# Patient Record
Sex: MALE | Race: WHITE | NOT HISPANIC OR LATINO | Employment: FULL TIME | URBAN - METROPOLITAN AREA
[De-identification: names, ages, dates, MRNs, and addresses within clinical notes are randomized per-mention and may not be internally consistent; named-entity substitution may affect disease eponyms.]

---

## 2017-02-04 ENCOUNTER — ALLSCRIPTS OFFICE VISIT (OUTPATIENT)
Dept: OTHER | Facility: OTHER | Age: 52
End: 2017-02-04

## 2017-02-16 LAB
BUN SERPL-MCNC: 11 MG/DL (ref 6–24)
BUN/CREA RATIO (HISTORICAL): 10 (ref 9–20)
CREAT SERPL-MCNC: 1.09 MG/DL (ref 0.76–1.27)
EGFR AFRICAN AMERICAN (HISTORICAL): 90 ML/MIN/1.73
EGFR-AMERICAN CALC (HISTORICAL): 78 ML/MIN/1.73

## 2017-02-17 ENCOUNTER — GENERIC CONVERSION - ENCOUNTER (OUTPATIENT)
Dept: OTHER | Facility: OTHER | Age: 52
End: 2017-02-17

## 2017-02-22 DIAGNOSIS — N28.9 DISORDER OF KIDNEY AND URETER: ICD-10-CM

## 2017-02-24 ENCOUNTER — GENERIC CONVERSION - ENCOUNTER (OUTPATIENT)
Dept: OTHER | Facility: OTHER | Age: 52
End: 2017-02-24

## 2017-02-27 ENCOUNTER — GENERIC CONVERSION - ENCOUNTER (OUTPATIENT)
Dept: OTHER | Facility: OTHER | Age: 52
End: 2017-02-27

## 2017-02-28 LAB — INTERPRETATION (HISTORICAL): NORMAL

## 2017-03-01 ENCOUNTER — GENERIC CONVERSION - ENCOUNTER (OUTPATIENT)
Dept: OTHER | Facility: OTHER | Age: 52
End: 2017-03-01

## 2017-03-01 ENCOUNTER — ALLSCRIPTS OFFICE VISIT (OUTPATIENT)
Dept: OTHER | Facility: OTHER | Age: 52
End: 2017-03-01

## 2017-03-01 LAB
BILIRUB UR QL STRIP: NEGATIVE
CLARITY UR: NORMAL
COLOR UR: CLEAR
GLUCOSE (HISTORICAL): NORMAL
HGB UR QL STRIP.AUTO: NEGATIVE
KETONES UR STRIP-MCNC: NEGATIVE MG/DL
LEUKOCYTE ESTERASE UR QL STRIP: NEGATIVE
NITRITE UR QL STRIP: NEGATIVE
PH UR STRIP.AUTO: 6.5 [PH]
PROT UR STRIP-MCNC: NEGATIVE MG/DL
SP GR UR STRIP.AUTO: 1.01
UROBILINOGEN UR QL STRIP.AUTO: NORMAL

## 2017-03-05 LAB
CULTURE RESULT (HISTORICAL): NORMAL
MISCELLANEOUS LAB TEST RESULT (HISTORICAL): NORMAL

## 2017-03-06 ENCOUNTER — GENERIC CONVERSION - ENCOUNTER (OUTPATIENT)
Dept: OTHER | Facility: OTHER | Age: 52
End: 2017-03-06

## 2017-03-15 ENCOUNTER — ALLSCRIPTS OFFICE VISIT (OUTPATIENT)
Dept: OTHER | Facility: OTHER | Age: 52
End: 2017-03-15

## 2017-06-01 ENCOUNTER — GENERIC CONVERSION - ENCOUNTER (OUTPATIENT)
Dept: OTHER | Facility: OTHER | Age: 52
End: 2017-06-01

## 2018-01-09 NOTE — RESULT NOTES
Message    Spoke to patient about the biopsy results  He has no symptoms  Regular bowel movements  Advised him to call if he develops any GI symptoms  1    Next colonoscopy in one year        reminder set for 1yr  thanks CF1       1 Amended By: Gilberto Mandujano; Mar 18 2016 11:33 AM EST    Verified Results  (1) TISSUE EXAM [id number] 09:47AM Sheela Pickens     Test Name Result Flag Reference   LAB AP CASE REPORT (Report)     Surgical Pathology Report             Case: [id number]                   Authorizing Provider: Sissy Pavon MD     Collected:      03/11/2016 [facility]  97         Ordering Location:   [facility]  Received:      03/11/2016 [address]                                     Pathologist:      Stephanie Shoemaker MD                                 Specimens:  A) - Large Intestine, Cecum, illeocecal valve; ulceration; cold bx                   B) - Polyp, Colorectal, 2 polyps same jar; cold snare and cold bx   LAB AP FINAL DIAGNOSIS (Report)     A  Ileocecal valve ulceration (biopsy):    - Focal active colitis  - No dysplasia, granulomata, or neoplasia identified  B  Sigmoid colon polyps (biopsy):    - Portions of tubular adenoma involving 3 of 4 tissue pieces  - No high grade dysplasia or malignancy identified  LAB AP NOTE      Intradepartmental consultation concurs with the diagnosis  Interpretation performed at [facility], [address] [facility] 18  LAB AP SURGICAL ADDITIONAL INFORMATION (Report)     These tests were developed and their performance characteristics   determined by [address] [place] [address] [facility] or [facility]  They may not be cleared or approved by the U S  Food and   Drug Administration  The FDA has determined that such clearance or   approval is not necessary  These tests are used for clinical purposes  They should not be regarded as investigational or for research   This   laboratory has been approved by CLIA 88, designated as a high-complexity   laboratory and is qualified to perform these tests  LAB AP GROSS DESCRIPTION (Report)     A  The specimen is received in formalin, labeled with the patient's name   and hospital number, and is designated ileocecal valve ulceration   biopsy  The specimen consists of multiple tan soft tissue fragments   measuring in aggregate 0 7 x 0 6 x 0 1 cm  Entirely submitted  One   cassette  B  The specimen is received in formalin, labeled with the patient's name   and hospital number, and is designated sigmoid colon polyps  The   specimen consists of 3 tan soft tissue fragments each measuring 0 3 cm  Entirely submitted  One cassette  Note: The estimated total formalin fixation time based upon information   provided by the submitting clinician and the standard processing schedule   is 66 5 hours      [facility]   LAB AP CLINICAL INFORMATION      Family history of malignant neoplasm of digestive organ

## 2018-01-10 NOTE — RESULT NOTES
Verified Results  (1) COMPREHENSIVE METABOLIC PANEL [id number] 01:28FM Berta Anguiano     Test Name Result Flag Reference   Glucose, Serum 104 mg/dL H 65-99   BUN 11 mg/dL  6-24   Creatinine, Serum 1 15 mg/dL  0 76-1 27   eGFR If NonAfricn Am 73 mL/min/1 73  >59   eGFR If Africn Am 85 mL/min/1 73  >59   BUN/Creatinine Ratio 10  9-20   Sodium, Serum 143 mmol/L  134-144   Potassium, Serum 4 7 mmol/L  3 5-5 2   Chloride, Serum 102 mmol/L     Carbon Dioxide, Total 25 mmol/L  18-29   Calcium, Serum 9 8 mg/dL  8 7-10 2   Protein, Total, Serum 6 7 g/dL  6 0-8 5   Albumin, Serum 4 6 g/dL  3 5-5 5   Globulin, Total 2 1 g/dL  1 5-4 5   A/G Ratio 2 2  1 1-2 5   **Effective March 13, 2017 the reference interval**                   for A/G Ratio will be changing to: Age                Male          Female                           0 -  7 days       1 1 - 2 3       1 1 - 2 3                           8 - 30 days       1 2 - 2 8       1 2 - 2 8                           1 -  6 months     1 3 - 3 6       1 3 - 3 6                    7 months -  5 years      1 5 - 2 6       1 5 - 2 6                              > 5 years      1 2 - 2 2       1 2 - 2 2   Bilirubin, Total 0 5 mg/dL  0 0-1 2   Alkaline Phosphatase, S 62 IU/L     AST (SGOT) 20 IU/L  0-40   ALT (SGPT) 42 IU/L  0-44     (1) LIPID PANEL FASTING W DIRECT LDL REFLEX [id number] 07:34AM Berta Anguiano     Test Name Result Flag Reference   Cholesterol, Total 214 mg/dL H 100-199   Triglycerides 147 mg/dL  0-149   HDL Cholesterol 45 mg/dL  >39   LDL Cholesterol Calc 140 mg/dL H 0-99     (1) PSA (SCREEN) (Dx V76 44 Screen for Prostate Cancer) [id number] 07:34AM Berta Anguiano     Test Name Result Flag Reference   Prostate Specific Ag, Serum 1 1 ng/mL  0 0-4 0   Roche ECLIA methodology  According to the American Urological Association, Serum PSA should  decrease and remain at undetectable levels after radical  prostatectomy   The A defines biochemical recurrence as an initial  PSA value 0 2 ng/mL or greater followed by a subsequent confirmatory  PSA value 0 2 ng/mL or greater  Values obtained with different assay methods or kits cannot be used  interchangeably  Results cannot be interpreted as absolute evidence  of the presence or absence of malignant disease

## 2018-01-11 NOTE — MISCELLANEOUS
Message   Recorded as Task   Date: 12/22/2016 02:22 PM, Created By: Eddie Wilcox   Task Name: Provider to Provider   Assigned To: Sekou Carranza   Regarding Patient: Kavon Arellano, Status: Active   Comment:    Sekou Carranza - 22 Dec 2016 2:22 PM     TASK CREATED  I spoke with patient because he got  a$2,[phone] bill from Juan Manuel Crutch because [employer] company coded the screening colonoscopy as a diagnostic  He is very frustrated  I know he spoke with you and you submitted a screening colonoscopy CPT  I spoke with billing also so said the insurance company changed it to a diagnostic  I spoke with the insurance company who told me the colonoscopy was billed as diagnostic because the code D12 5 was attached to the claim BUT it would be a screening colonoscopy if the diagnosis of Z12 11 (and only that code) was attached  I spoke with billing at [facility] and they said that if you submitted a new rx to them, clarifying the ICD-10 code for the procedure was Z12 11 (fax to [phone], reference account # [de-identified]) then [facility] would resubmit it and Beau Kay at [facility] would change the procedure back to screening instead of diagnostic  Would you please do this? Thanks  Mani Dc - 23 Dec 2016 3:59 PM     TASK REPLIED TO: Previously Assigned To Jeremie Batres,  I just talked to my   and she said she spoke to the billing department before  We coded as screening for family history of colon cancer and it looks like issues is with the hospital billing  She will call our  again   Eddie Wilcox - 23 Dec 2016 4:09 PM     TASK EDITED  noted    (s/w Dr Rashawn Bradley about issue)        Signatures   Electronically signed by : Cisco Dale DO; Dec 23 2016  4:09PM EST                       (Author)

## 2018-01-11 NOTE — PROGRESS NOTES
Assessment    1  Bladder wall thickening (596 89) (N32 89)   2  Encounter for preventive health examination (V70 0) (Z00 00)   3  Lesion of left native kidney (593 9) (N28 9)   4  BMI 31 0-31 9,adult (V85 31) (Z68 31)    Discussion/Summary    A urine cytology test, if not already done, could be ordered to help decide on further testing or monitoring  Chief Complaint  pt present for a CPE  hg      History of Present Illness  HM, Adult Male: The patient is being seen for a health maintenance evaluation  General Health: The patient's health since the last visit is described as good  Immunizations status: up to date  Lifestyle:  He consumes a diverse and healthy diet  He does not use tobacco    Screening: Additional History:  labs were reviewed  he has urology f/u pending  left renal mass excision vs observation  HPI: still gets discomfort in scrotum b/l, chronic, dull, no dysuria  has some abdominal neuritis type discomfort, no trigger, CT abdomen/pelvis w/o findings in abdomen besides kidney/bladder, not sure if gets better at chiropractor  had EKATERINA at urologist recently      Review of Systems    Constitutional: no fever and no chills  Cardiovascular: no chest pain  Gastrointestinal: abdominal pain  Genitourinary: no dysuria and no urinary hesitancy  Active Problems    1  Abdominal pain (789 00) (R10 9)   2  Actinic keratoses (702 0) (L57 0)   3  Bladder wall thickening (596 89) (N32 89)   4  Colon cancer screening (V76 51) (Z12 11)   5  Dysuria (788 1) (R30 0)   6  Family history of colon cancer (V16 0) (Z80 0)   7  Fatty liver (571 8) (K76 0)   8  Hernia (553 9) (K46 9)   9  High triglycerides (272 1) (E78 1)   10  Hyperlipidemia LDL goal <160 (272 4) (E78 5)   11  Immunization due (V05 9) (Z23)   12  Lesion of left native kidney (593 9) (N28 9)   13  Obesity (278 00) (E66 9)   14  Prostate cancer screening (V76 44) (Z12 5)   15   Screening for cardiovascular, respiratory, and genitourinary diseases    (V81 2,V81 4,V81 6) (Z13 6,Z13 83,Z13 89)   16  Screening for diabetes mellitus (DM) (V77 1) (Z13 1)   17  Seborrheic keratosis (702 19) (L82 1)   18  History of Tobacco use (305 1) (Z72 0)    Past Medical History    · History of Acute maxillary sinusitis (461 0) (J01 00)   · Acute upper respiratory infection (465 9) (J06 9)   · History of Acute upper respiratory infection (465 9) (J06 9)   · History of Dyspepsia (536 8) (K30)   · History of acute bronchitis (V12 69) (Z87 09)   · History of acute bronchitis (V12 69) (Z87 09)   · History of acute sinusitis (V12 69) (Z87 09)   · History of chalazion (V12 49) (Z86 69)   · History of nicotine dependence (V15 82) (Z87 891)   · History of non-neoplastic nevus (V13 3) (Z86 79)   · History of onychomycosis (V12 09) (Z86 19)   · History of Immunization due (V05 9) (Z23)   · History of Neoplasm of bone (239 2) (D49 2)   · History of Sprain (848 8)   · Well adult on routine health check (V70 0) (Z00 00)    Surgical History    · History of Cholecystectomy   · History of Hernia Repair    Family History  Mother    · Denied: Family history of Crohn's disease without complication, unspecified  gastrointestinal tract location   · Denied: Family history of liver disease  Father    · Family history of CAD (coronary artery disease)   · Denied: Family history of Crohn's disease without complication, unspecified  gastrointestinal tract location   · Family history of hypertension (V17 49) (Z82 49)   · Denied: Family history of liver disease   · Family history of lung cancer (V16 1) (Z80 1)   · Family history of premature CAD (V17 3) (Z82 49)   · Family history of Malignant neoplasm of colon, unspecified part of colon    Social History    · Non drinker / no alcohol use   · History of Tobacco use (305 1) (Z72 0)    Current Meds   1  No Reported Medications Recorded    Allergies    1   No Known Drug Allergies    Vitals   Recorded: [id number] 04:47PM   Temperature 98 F   Heart Rate 80   Respiration 18   Systolic 776   Diastolic 70   Height 6 ft    Weight 234 lb    BMI Calculated 31 74   BSA Calculated 2 28     Physical Exam    Constitutional   General appearance: No acute distress, well appearing and well nourished  Eyes   Conjunctiva and lids: No erythema, swelling or discharge  Pupils and irises: Equal, round, reactive to light  Ears, Nose, Mouth, and Throat   External inspection of ears and nose: Normal     Otoscopic examination: Tympanic membranes translucent with normal light reflex  Canals patent without erythema  Nasal mucosa, septum, and turbinates: Normal without edema or erythema  Lips, teeth, and gums: Normal, good dentition  Oropharynx: Normal with no erythema, edema, exudate or lesions  Neck   Neck: Supple, symmetric, trachea midline, no masses  Pulmonary   Respiratory effort: No increased work of breathing or signs of respiratory distress  Auscultation of lungs: Clear to auscultation  Cardiovascular   Palpation of heart: Normal PMI, no thrills  Auscultation of heart: Normal rate and rhythm, normal S1 and S2, no murmurs  Pedal pulses: 2+ bilaterally  Examination of extremities for edema and/or varicosities: Normal     Abdomen   Abdomen: Non-tender, no masses  Liver and spleen: No hepatomegaly or splenomegaly  Examination for hernias: No hernias appreciated  Genitourinary   Scrotal contents: Normal testes, no masses  Penis: Normal, no lesions  Lymphatic   Palpation of lymph nodes in neck: No lymphadenopathy  Palpation of lymph nodes in groin: No lymphadenopathy  Musculoskeletal   Gait and station: Normal     Inspection/palpation of digits and nails: Normal without clubbing or cyanosis  Inspection/palpation of joints, bones, and muscles: Normal     Range of motion: Normal     Stability: Normal     Muscle strength/tone: Normal     Skin   Skin and subcutaneous tissue: Normal without rashes or lesions      Palpation of skin and subcutaneous tissue: Normal turgor  Neurologic   Reflexes: 2+ and symmetric  Sensation: No sensory loss      Psychiatric   Judgment and insight: Normal     Mood and affect: Normal        Results/Data  Rebuck Risk for General CVD [id number] 05:05PM Jagdeep Contreras     Test Name Result Flag Reference   Rebuck CVD - Ten Year 10 2 %     Sex: Male  Age: 46  Total Cholesterol: 214 mg/dL  HDL Cholesterol: 45 mg/dL  Systolic Blood Pressure: 305 mmHg  Diabetes: No  Smoking Status: No  Being treated for hypertension: No   Rebuck CVD - Heart Age 54 Years     Rebuck CVD - Normal 8 1 %         Procedure    Procedure:   Results: 20/15 in both eyes without corrective device, 20/15 in the right eye without corrective device, 20/15 in the left eye without corrective device      Provider Comments  Provider Comments:   framingham score advised  labs reviewed  cont to avoid tobacco        Signatures   Electronically signed by : Fifi Olsen DO; Mar 15 2017  9:15PM EST                       (Author)

## 2018-01-12 VITALS
TEMPERATURE: 96.2 F | HEIGHT: 72 IN | BODY MASS INDEX: 30.07 KG/M2 | DIASTOLIC BLOOD PRESSURE: 88 MMHG | SYSTOLIC BLOOD PRESSURE: 130 MMHG | RESPIRATION RATE: 16 BRPM | HEART RATE: 76 BPM | WEIGHT: 222 LBS

## 2018-01-12 NOTE — RESULT NOTES
Verified Results  [facility]) BUN+Creat [id number] 07:45AM Mayme Sa     Test Name Result Flag Reference   BUN 11 mg/dL  6-24   Creatinine, Serum 1 09 mg/dL  0 76-1 27   eGFR If NonAfricn Am 78 mL/min/1 73  >59   eGFR If Africn Am 90 mL/min/1 73  >59   BUN/Creatinine Ratio 10  9-20       Discussion/Summary   Tobias Marcelo,   Your kidney function is normal   Dr Germania Reid

## 2018-01-13 VITALS
TEMPERATURE: 97.7 F | HEIGHT: 72 IN | BODY MASS INDEX: 30.07 KG/M2 | WEIGHT: 222 LBS | SYSTOLIC BLOOD PRESSURE: 138 MMHG | RESPIRATION RATE: 16 BRPM | DIASTOLIC BLOOD PRESSURE: 80 MMHG | HEART RATE: 82 BPM

## 2018-01-13 VITALS
TEMPERATURE: 98 F | HEART RATE: 80 BPM | RESPIRATION RATE: 18 BRPM | WEIGHT: 234 LBS | SYSTOLIC BLOOD PRESSURE: 128 MMHG | DIASTOLIC BLOOD PRESSURE: 70 MMHG | BODY MASS INDEX: 31.69 KG/M2 | HEIGHT: 72 IN

## 2018-01-15 NOTE — MISCELLANEOUS
Message   Recorded as Task   Date: 12/15/2016 07:50 PM, Created By: Irma Beckett   Task Name: Follow Up   Assigned To: Sekou Carranza   Regarding Patient: Olinda Winchester, Status: Active   Comment:    Fanny Banda - 15 Dec 2016 7:50 PM     TASK CREATED  Caller: Self; Other; [phone] (Home)  Patient dropped off records he discussed with Dr John Paul Schultz recently  He said Dr John Paul Schultz knows all about it  Sara Buck - 15 Dec 2016 7:53 PM     TASK REASSIGNED: [facility] [place]  put papers in your folder   ac/cma   Sekou Carranza - 22 Dec 2016 10:32 PM     TASK EDITED  s/w pt about my conversation with his insurance company    [facility]  [id number]  [id number]    about claim # [id number]  and claim # [id number]  CPT 46749    and ANGÉLICA Richards (Yenny)  and Dusty Duverney    who sugggested resubmit claim w/o polyp code D12 5 but only screen code Z12 11        Signatures   Electronically signed by : Zahida Silva DO; Dec 22 2016 10:32PM EST                       (Author)

## 2018-01-15 NOTE — MISCELLANEOUS
Message   Recorded as Task   Date: 02/24/2017 12:34 PM, Created By: Scarlett Cartagena   Task Name: Call Back   Assigned To: Sekou Carranza   Regarding Patient: Sabi Gallego, Status: Active   CommentRosabel Blush - 24 Feb 2017 12:34 PM     TASK CREATED  Caller: Self; [phone] (Home)  DR Nehal Rojas    Please call patient back  He needs to ask some questions  Zeynep Primus - 24 Feb 2017 1:23 PM     TASK EDITED  He wanted to speak to you personally about the CT results     Sekou Carranza - 24 Feb 2017 6:14 PM     TASK EDITED  s/w pt  await US  labs Monday  vague leg/groin pain  may need urology eval, possible ortho  schedule cpe after labs        Signatures   Electronically signed by : Peg Webb DO; Feb 24 2017  6:14PM EST                       (Author)

## 2018-01-15 NOTE — RESULT NOTES
Verified Results  (1) URINE CULTURE [id number] 12:00AM Janina Ramirez     Test Name Result Flag Reference   Result 1 Comment     No growth in 36 - 48 hours  Urine Culture,Comprehensive Final report         Discussion/Summary   Urine test is normal, no infection    Dr Debbi Camacho

## 2018-01-15 NOTE — RESULT NOTES
Verified Results  [facility]) CMP14+eGFR [id number] 08:19AM Payfirma     Test Name Result Flag Reference   Glucose, Serum 94 mg/dL  65-99   BUN 8 mg/dL  6-24   Creatinine, Serum 1 16 mg/dL  0 76-1 27   eGFR If NonAfricn Am 73 mL/min/1 73  >59   eGFR If Africn Am 84 mL/min/1 73  >59   BUN/Creatinine Ratio 7 L 9-20   Sodium, Serum 141 mmol/L  134-144   Potassium, Serum 4 9 mmol/L  3 5-5 2   Chloride, Serum 102 mmol/L     Carbon Dioxide, Total 24 mmol/L  18-29   Calcium, Serum 9 6 mg/dL  8 7-10 2   Protein, Total, Serum 6 5 g/dL  6 0-8 5   Albumin, Serum 4 5 g/dL  3 5-5 5   Globulin, Total 2 0 g/dL  1 5-4 5   A/G Ratio 2 3  1 1-2 5   Bilirubin, Total 0 4 mg/dL  0 0-1 2   Alkaline Phosphatase, S 73 IU/L     AST (SGOT) 22 IU/L  0-40   ALT (SGPT) 33 IU/L  0-44     (LC) Lipid Panel [id number] 08:19AM Payfirma     Test Name Result Flag Reference   Cholesterol, Total 253 mg/dL H 100-199   Triglycerides 168 mg/dL H 0-149   HDL Cholesterol 40 mg/dL  >39   According to ATP-III Guidelines, HDL-C >59 mg/dL is considered a  negative risk factor for CHD  VLDL Cholesterol Alex 34 mg/dL  5-40   LDL Cholesterol Calc 179 mg/dL H 0-99     (1) PSA (SCREEN) (Dx V76 44 Screen for Prostate Cancer) [id number] 08:19AM Payfirma     Test Name Result Flag Reference   Prostate Specific Ag, Serum 1 4 ng/mL  0 0-4 0   Roche ECLIA methodology  According to the American Urological Association, Serum PSA should  decrease and remain at undetectable levels after radical  prostatectomy  The AUA defines biochemical recurrence as an initial  PSA value 0 2 ng/mL or greater followed by a subsequent confirmatory  PSA value 0 2 ng/mL or greater  Values obtained with different assay methods or kits cannot be used  interchangeably  Results cannot be interpreted as absolute evidence  of the presence or absence of malignant disease       [facility]) Cardiovascular Risk Assessment [id number] 08:19AM Shirlean Knife     Test Name Result Flag Reference   Interpretation Note     Supplement report is available  PDF Image   [facility]) Thyroid [place] Profile [id number] 08:19AM Jovitamandeep Gus     Test Name Result Flag Reference   TSH 2 890 uIU/mL  0 450-4 500       Discussion/Summary   Please schedule your annual physical office visit  LDL bad cholesterol goal <130    Dr Albin Barfield

## 2018-11-27 ENCOUNTER — OFFICE VISIT (OUTPATIENT)
Dept: FAMILY MEDICINE CLINIC | Facility: CLINIC | Age: 53
End: 2018-11-27
Payer: COMMERCIAL

## 2018-11-27 VITALS
BODY MASS INDEX: 32.53 KG/M2 | HEART RATE: 92 BPM | TEMPERATURE: 97.6 F | SYSTOLIC BLOOD PRESSURE: 136 MMHG | HEIGHT: 72 IN | RESPIRATION RATE: 16 BRPM | DIASTOLIC BLOOD PRESSURE: 90 MMHG | WEIGHT: 240.2 LBS

## 2018-11-27 DIAGNOSIS — J06.9 ACUTE URI: Primary | ICD-10-CM

## 2018-11-27 PROBLEM — A04.8 HELICOBACTER PYLORI (H. PYLORI) INFECTION: Status: ACTIVE | Noted: 2017-07-12

## 2018-11-27 PROBLEM — K21.9 GE REFLUX: Status: ACTIVE | Noted: 2017-06-29

## 2018-11-27 PROBLEM — N28.9 LESION OF LEFT NATIVE KIDNEY: Status: ACTIVE | Noted: 2017-02-22

## 2018-11-27 PROBLEM — N32.89 BLADDER WALL THICKENING: Status: ACTIVE | Noted: 2017-03-01

## 2018-11-27 PROBLEM — K76.0 FATTY LIVER: Status: ACTIVE | Noted: 2017-02-24

## 2018-11-27 PROCEDURE — 1036F TOBACCO NON-USER: CPT | Performed by: NURSE PRACTITIONER

## 2018-11-27 PROCEDURE — 99213 OFFICE O/P EST LOW 20 MIN: CPT | Performed by: NURSE PRACTITIONER

## 2018-11-27 PROCEDURE — 3008F BODY MASS INDEX DOCD: CPT | Performed by: NURSE PRACTITIONER

## 2018-11-27 PROCEDURE — 3725F SCREEN DEPRESSION PERFORMED: CPT | Performed by: NURSE PRACTITIONER

## 2018-11-27 RX ORDER — AZITHROMYCIN 250 MG/1
TABLET, FILM COATED ORAL
Qty: 6 TABLET | Refills: 0 | Status: SHIPPED | OUTPATIENT
Start: 2018-11-27 | End: 2018-12-02

## 2018-11-27 NOTE — PROGRESS NOTES
Subjective:      Patient ID: Rio Smith is a 46 y o  male  Chief Complaint   Patient presents with    Cough     patient states symptoms started on sunday 11/25/18 morning   af/rma     bilateral ear pressure    sinus pressure       HPI  Patient stated that started with cough and ear pressure couple of days ago and getting worse  Quit smoking 2 years ago  Not taking any OTC  Denies fever, chills and sob  The following portions of the patient's history were reviewed and updated as appropriate: allergies, current medications, past family history, past medical history, past social history, past surgical history and problem list       Review of Systems   Constitutional: Negative for chills, fatigue and fever  HENT: Positive for congestion and ear pain  Negative for ear discharge, facial swelling, hearing loss, mouth sores, nosebleeds, postnasal drip, rhinorrhea, sinus pain, sinus pressure, sneezing, sore throat, trouble swallowing and voice change  Respiratory: Positive for cough  Negative for chest tightness, shortness of breath and wheezing  Cardiovascular: Negative  Gastrointestinal: Negative for abdominal pain, constipation, diarrhea and nausea  Genitourinary: Negative  Neurological: Negative for dizziness, weakness, light-headedness and headaches  Objective:    History   Smoking Status    Former Smoker    Packs/day: 2 00    Years: 40 00    Quit date: 4/24/2016   Smokeless Tobacco    Never Used       Allergies: No Known Allergies    Vitals:  /90   Pulse 92   Temp 97 6 °F (36 4 °C)   Resp 16   Ht 6' (1 829 m)   Wt 109 kg (240 lb 3 2 oz)   BMI 32 58 kg/m²     Current Outpatient Prescriptions   Medication Sig Dispense Refill    azithromycin (ZITHROMAX) 250 mg tablet Take 500mg on day 1, 250mg on days 2-5 6 tablet 0     No current facility-administered medications for this visit             Physical Exam   Constitutional: He is oriented to person, place, and time  He appears well-developed and well-nourished  HENT:   Head: Normocephalic  Right Ear: Tympanic membrane, external ear and ear canal normal    Left Ear: Tympanic membrane, external ear and ear canal normal    Nose: Nose normal  Right sinus exhibits no maxillary sinus tenderness and no frontal sinus tenderness  Left sinus exhibits no maxillary sinus tenderness and no frontal sinus tenderness  Mouth/Throat: Mucous membranes are normal  Posterior oropharyngeal erythema present  Neck: Neck supple  Cardiovascular: Normal rate, regular rhythm and normal heart sounds  Pulmonary/Chest: Effort normal and breath sounds normal    Abdominal: Normal appearance and bowel sounds are normal  There is no hepatosplenomegaly  There is no tenderness  There is no rebound  Musculoskeletal: Normal range of motion  Lymphadenopathy:        Right cervical: No superficial cervical and no posterior cervical adenopathy present  Left cervical: No superficial cervical and no posterior cervical adenopathy present  Neurological: He is alert and oriented to person, place, and time  Skin: Skin is warm and dry  Psychiatric: He has a normal mood and affect  His behavior is normal  Judgment and thought content normal    Vitals reviewed  Assessment/Plan:         Diagnoses and all orders for this visit:    Acute URI  -     azithromycin (ZITHROMAX) 250 mg tablet; Take 500mg on day 1, 250mg on days 2-5    BMI 32 0-32 9,adult            Patient Instructions: Take medication with food  It is important that you take the entire course of antibiotics prescribed  May also take a probiotic of your choice to maintain healthy GI ravi  Can take some probiotic and yogurt with the medication  Increase fluid intake, saline nasal rinses, and hot tea with honey and lemon  Cool air humidification can be helpful as well  May take Ibuprofen or Tylenol as needed for pain or fevers      Mucinex D for sinus congestion or Coricidin HBP if you have high blood pressure or a heart condition  Mucinex or Robitussin DM are effective for cough and chest congestion  Supportive care discussed and advised  Follow up for no improvement and worsening of conditions  Patient advised and educated when to see immediate medical care    Return if symptoms worsen or fail to improve, for Annual physical       Orma PlantsHIPOLITO

## 2018-11-27 NOTE — PATIENT INSTRUCTIONS
Take medication with food  It is important that you take the entire course of antibiotics prescribed  May also take a probiotic of your choice to maintain healthy GI ravi  Can take some probiotic and yogurt with the medication  Increase fluid intake, saline nasal rinses, and hot tea with honey and lemon  Cool air humidification can be helpful as well  May take Ibuprofen or Tylenol as needed for pain or fevers  Mucinex D for sinus congestion or Coricidin HBP if you have high blood pressure or a heart condition  Mucinex or Robitussin DM are effective for cough and chest congestion  Supportive care discussed and advised  Follow up for no improvement and worsening of conditions  Patient advised and educated when to see immediate medical care  Upper Respiratory Infection   WHAT YOU NEED TO KNOW:   An upper respiratory infection is also called the common cold  It is an infection that can affect your nose, throat, ears, and sinuses  For healthy people, the common cold is usually not serious and does not need special treatment  Cold symptoms are usually worst for the first 3 to 5 days  Most people get better in 7 to 14 days  You may continue to cough for 2 to 3 weeks  Colds are caused by viruses and do not get better with antibiotics  DISCHARGE INSTRUCTIONS:   Return to the emergency department if:   · You have chest pain or trouble breathing  Contact your healthcare provider if:   · You have a fever over 102ºF (39°C)  · Your sore throat gets worse or you see white or yellow spots in your throat  · Your symptoms get worse after 3 to 5 days or your cold is not better in 14 days  · You have a rash anywhere on your skin  · You have large, tender lumps in your neck  · You have thick, green or yellow drainage from your nose  · You cough up thick yellow, green, or bloody mucus  · You have vomiting for more than 24 hours and cannot keep fluids down      · You have a bad earache  · You have questions or concerns about your condition or care  Medicines: You may need any of the following:  · Decongestants  help reduce nasal congestion and help you breathe more easily  If you take decongestant pills, they may make you feel restless or cause problems with your sleep  Do not use decongestant sprays for more than a few days  · Cough suppressants  help reduce coughing  Ask your healthcare provider which type of cough medicine is best for you  · NSAIDs , such as ibuprofen, help decrease swelling, pain, and fever  NSAIDs can cause stomach bleeding or kidney problems in certain people  If you take blood thinner medicine, always ask your healthcare provider if NSAIDs are safe for you  Always read the medicine label and follow directions  · Acetaminophen  decreases pain and fever  It is available without a doctor's order  Ask how much to take and how often to take it  Follow directions  Read the labels of all other medicines you are using to see if they also contain acetaminophen, or ask your doctor or pharmacist  Acetaminophen can cause liver damage if not taken correctly  Do not use more than 4 grams (4,000 milligrams) total of acetaminophen in one day  · Take your medicine as directed  Contact your healthcare provider if you think your medicine is not helping or if you have side effects  Tell him or her if you are allergic to any medicine  Keep a list of the medicines, vitamins, and herbs you take  Include the amounts, and when and why you take them  Bring the list or the pill bottles to follow-up visits  Carry your medicine list with you in case of an emergency  Follow up with your healthcare provider as directed:  Write down your questions so you remember to ask them during your visits  Self-care:   · Rest as much as possible  Slowly start to do more each day  · Drink more liquids as directed  Liquids will help thin and loosen mucus so you can cough it up  Liquids will also help prevent dehydration  Liquids that help prevent dehydration include water, fruit juice, and broth  Do not drink liquids that contain caffeine  Caffeine can increase your risk for dehydration  Ask your healthcare provider how much liquid to drink each day  · Soothe a sore throat  Gargle with warm salt water  This helps your sore throat feel better  Make salt water by dissolving ¼ teaspoon salt in 1 cup warm water  You may also suck on hard candy or throat lozenges  You may use a sore throat spray  · Use a humidifier or vaporizer  Use a cool mist humidifier or a vaporizer to increase air moisture in your home  This may make it easier for you to breathe and help decrease your cough  · Use saline nasal drops as directed  These help relieve congestion  · Apply petroleum-based jelly around the outside of your nostrils  This can decrease irritation from blowing your nose  · Do not smoke  Nicotine and other chemicals in cigarettes and cigars can make your symptoms worse  They can also cause infections such as bronchitis or pneumonia  Ask your healthcare provider for information if you currently smoke and need help to quit  E-cigarettes or smokeless tobacco still contain nicotine  Talk to your healthcare provider before you use these products  Prevent spreading your cold to others:   · Try to stay away from other people during the first 2 to 3 days of your cold when it is more easily spread  · Do not share food or drinks  · Do not share hand towels with household members  · Wash your hands often, especially after you blow your nose  Turn away from other people and cover your mouth and nose with a tissue when you sneeze or cough  © 2017 [address] [employer] Information is for End User's use only and may not be sold, redistributed or otherwise used for commercial purposes   All illustrations and images included in CareNotes® are the copyrighted property of [employer]  or Sanjay King  The above information is an  only  It is not intended as medical advice for individual conditions or treatments  Talk to your doctor, nurse or pharmacist before following any medical regimen to see if it is safe and effective for you

## 2019-01-14 ENCOUNTER — TELEPHONE (OUTPATIENT)
Dept: FAMILY MEDICINE CLINIC | Facility: CLINIC | Age: 54
End: 2019-01-14

## 2019-01-14 DIAGNOSIS — Z12.5 PROSTATE CANCER SCREENING: ICD-10-CM

## 2019-01-14 DIAGNOSIS — R73.01 IFG (IMPAIRED FASTING GLUCOSE): ICD-10-CM

## 2019-01-14 DIAGNOSIS — Z13.89 SCREENING FOR CARDIOVASCULAR, RESPIRATORY, AND GENITOURINARY DISEASES: Primary | ICD-10-CM

## 2019-01-14 DIAGNOSIS — Z13.1 SCREENING FOR DIABETES MELLITUS (DM): ICD-10-CM

## 2019-01-14 DIAGNOSIS — Z13.83 SCREENING FOR CARDIOVASCULAR, RESPIRATORY, AND GENITOURINARY DISEASES: Primary | ICD-10-CM

## 2019-01-14 DIAGNOSIS — Z13.6 SCREENING FOR CARDIOVASCULAR, RESPIRATORY, AND GENITOURINARY DISEASES: Primary | ICD-10-CM

## 2019-01-19 LAB
ALBUMIN SERPL-MCNC: 4.7 G/DL (ref 3.5–5.5)
ALBUMIN/GLOB SERPL: 2.2 {RATIO} (ref 1.2–2.2)
ALP SERPL-CCNC: 58 IU/L (ref 39–117)
ALT SERPL-CCNC: 39 IU/L (ref 0–44)
AST SERPL-CCNC: 25 IU/L (ref 0–40)
BILIRUB SERPL-MCNC: 0.5 MG/DL (ref 0–1.2)
BUN SERPL-MCNC: 13 MG/DL (ref 6–24)
BUN/CREAT SERPL: 12 (ref 9–20)
CALCIUM SERPL-MCNC: 9.6 MG/DL (ref 8.7–10.2)
CHLORIDE SERPL-SCNC: 104 MMOL/L (ref 96–106)
CHOLEST SERPL-MCNC: 240 MG/DL (ref 100–199)
CO2 SERPL-SCNC: 23 MMOL/L (ref 20–29)
CREAT SERPL-MCNC: 1.12 MG/DL (ref 0.76–1.27)
EST. AVERAGE GLUCOSE BLD GHB EST-MCNC: 123 MG/DL
GLOBULIN SER-MCNC: 2.1 G/DL (ref 1.5–4.5)
GLUCOSE SERPL-MCNC: 107 MG/DL (ref 65–99)
HBA1C MFR BLD: 5.9 % (ref 4.8–5.6)
HDLC SERPL-MCNC: 46 MG/DL
LDLC SERPL CALC-MCNC: 164 MG/DL (ref 0–99)
LDLC/HDLC SERPL: 3.6 RATIO (ref 0–3.6)
MICRODELETION SYND BLD/T FISH: NORMAL
POTASSIUM SERPL-SCNC: 4.3 MMOL/L (ref 3.5–5.2)
PROT SERPL-MCNC: 6.8 G/DL (ref 6–8.5)
PSA SERPL-MCNC: 2.2 NG/ML (ref 0–4)
SL AMB EGFR AFRICAN AMERICAN: 86 ML/MIN/1.73
SL AMB EGFR NON AFRICAN AMERICAN: 75 ML/MIN/1.73
SL AMB VLDL CHOLESTEROL CALC: 30 MG/DL (ref 5–40)
SODIUM SERPL-SCNC: 144 MMOL/L (ref 134–144)
TRIGL SERPL-MCNC: 150 MG/DL (ref 0–149)

## 2019-01-23 ENCOUNTER — OFFICE VISIT (OUTPATIENT)
Dept: FAMILY MEDICINE CLINIC | Facility: CLINIC | Age: 54
End: 2019-01-23
Payer: COMMERCIAL

## 2019-01-23 VITALS
DIASTOLIC BLOOD PRESSURE: 90 MMHG | RESPIRATION RATE: 16 BRPM | WEIGHT: 243 LBS | BODY MASS INDEX: 32.91 KG/M2 | SYSTOLIC BLOOD PRESSURE: 142 MMHG | HEART RATE: 84 BPM | TEMPERATURE: 97.3 F | HEIGHT: 72 IN

## 2019-01-23 DIAGNOSIS — C67.9 BLADDER CARCINOMA (HCC): ICD-10-CM

## 2019-01-23 DIAGNOSIS — R73.01 IFG (IMPAIRED FASTING GLUCOSE): ICD-10-CM

## 2019-01-23 DIAGNOSIS — Z00.00 HEALTHCARE MAINTENANCE: Primary | ICD-10-CM

## 2019-01-23 DIAGNOSIS — E66.9 OBESITY (BMI 30-39.9): ICD-10-CM

## 2019-01-23 DIAGNOSIS — Z91.89 FRAMINGHAM CARDIAC RISK 10-20% IN NEXT 10 YEARS: ICD-10-CM

## 2019-01-23 DIAGNOSIS — R10.32 LEFT LOWER QUADRANT PAIN: ICD-10-CM

## 2019-01-23 DIAGNOSIS — R03.0 ELEVATED BP WITHOUT DIAGNOSIS OF HYPERTENSION: ICD-10-CM

## 2019-01-23 DIAGNOSIS — R97.20 INCREASED PROSTATE SPECIFIC ANTIGEN (PSA) VELOCITY: ICD-10-CM

## 2019-01-23 PROBLEM — A04.8 HELICOBACTER PYLORI (H. PYLORI) INFECTION: Status: RESOLVED | Noted: 2017-07-12 | Resolved: 2019-01-23

## 2019-01-23 PROBLEM — K21.9 GE REFLUX: Status: RESOLVED | Noted: 2017-06-29 | Resolved: 2019-01-23

## 2019-01-23 PROCEDURE — 3725F SCREEN DEPRESSION PERFORMED: CPT | Performed by: FAMILY MEDICINE

## 2019-01-23 PROCEDURE — 99396 PREV VISIT EST AGE 40-64: CPT | Performed by: FAMILY MEDICINE

## 2019-01-23 NOTE — PROGRESS NOTES
Assessment/Plan:    No problem-specific Assessment & Plan notes found for this encounter  cpe today  He plans to see Dr Nancy ruiz for bladder ca f/u and psa elevation  Watch bp  Working on weight  Declines statin  Prediabetes aware  Diet/exercise/weight loss is recommended  Diagnoses and all orders for this visit:    Healthcare maintenance    [place] cardiac risk 10-20% in next 10 years    Increased prostate specific antigen (PSA) velocity    Left lower quadrant pain    Elevated BP without diagnosis of hypertension    Bladder carcinoma (HCC)    Obesity (BMI 30-39  9)    IFG (impaired fasting glucose)              No Follow-up on file  Subjective:      Patient ID: Real Sweeney is a 48 y o  male  Chief Complaint   Patient presents with    Physical Exam    Skin tag removal       HPI  Labs reviewed  Can do better with diet  No exercise  Still not smoking  No etoh    The following portions of the patient's history were reviewed and updated as appropriate: allergies, current medications, past family history, past medical history, past social history, past surgical history and problem list     Review of Systems   Respiratory: Negative for shortness of breath  Cardiovascular: Negative for chest pain  No current outpatient prescriptions on file  No current facility-administered medications for this visit  Objective:    /90   Pulse 84   Temp (!) 97 3 °F (36 3 °C)   Resp 16   Ht 6' (1 829 m)   Wt 110 kg (243 lb)   BMI 32 96 kg/m²        Physical Exam   Constitutional: He appears well-developed  No distress  HENT:   Head: Normocephalic  Mouth/Throat: No oropharyngeal exudate  Eyes: Conjunctivae are normal  No scleral icterus  Neck: Neck supple  No thyromegaly present  Cardiovascular: Normal rate and intact distal pulses  No murmur heard  Pulmonary/Chest: Effort normal  No respiratory distress  He has no wheezes  Abdominal: Soft  He exhibits no mass   There is no tenderness  Musculoskeletal: He exhibits no edema or deformity  Lymphadenopathy:     He has no cervical adenopathy  Neurological: He is alert  Skin: Skin is warm and dry  No rash noted  No pallor  Psychiatric: His behavior is normal  Thought content normal    Nursing note and vitals reviewed      skin growth llq, no pigmentation or scale         Dashawn Benny, DO

## 2019-06-10 ENCOUNTER — OFFICE VISIT (OUTPATIENT)
Dept: FAMILY MEDICINE CLINIC | Facility: CLINIC | Age: 54
End: 2019-06-10
Payer: COMMERCIAL

## 2019-06-10 VITALS
TEMPERATURE: 99.8 F | SYSTOLIC BLOOD PRESSURE: 132 MMHG | DIASTOLIC BLOOD PRESSURE: 88 MMHG | RESPIRATION RATE: 16 BRPM | BODY MASS INDEX: 31.56 KG/M2 | WEIGHT: 233 LBS | HEIGHT: 72 IN | HEART RATE: 100 BPM

## 2019-06-10 DIAGNOSIS — J01.00 ACUTE NON-RECURRENT MAXILLARY SINUSITIS: Primary | ICD-10-CM

## 2019-06-10 DIAGNOSIS — C67.9 BLADDER CARCINOMA (HCC): ICD-10-CM

## 2019-06-10 DIAGNOSIS — R73.01 IFG (IMPAIRED FASTING GLUCOSE): ICD-10-CM

## 2019-06-10 PROBLEM — R10.32 LEFT LOWER QUADRANT PAIN: Status: RESOLVED | Noted: 2019-01-23 | Resolved: 2019-06-10

## 2019-06-10 PROCEDURE — 1036F TOBACCO NON-USER: CPT | Performed by: FAMILY MEDICINE

## 2019-06-10 PROCEDURE — 99213 OFFICE O/P EST LOW 20 MIN: CPT | Performed by: FAMILY MEDICINE

## 2019-06-10 PROCEDURE — 3008F BODY MASS INDEX DOCD: CPT | Performed by: FAMILY MEDICINE

## 2019-06-10 RX ORDER — AMOXICILLIN AND CLAVULANATE POTASSIUM 875; 125 MG/1; MG/1
1 TABLET, FILM COATED ORAL 2 TIMES DAILY
Qty: 20 TABLET | Refills: 0 | Status: SHIPPED | OUTPATIENT
Start: 2019-06-10 | End: 2019-06-10 | Stop reason: SDUPTHER

## 2019-06-10 RX ORDER — AMOXICILLIN AND CLAVULANATE POTASSIUM 875; 125 MG/1; MG/1
1 TABLET, FILM COATED ORAL 2 TIMES DAILY
Qty: 20 TABLET | Refills: 0 | Status: SHIPPED | OUTPATIENT
Start: 2019-06-10 | End: 2019-06-20

## 2019-11-03 PROBLEM — J01.00 ACUTE NON-RECURRENT MAXILLARY SINUSITIS: Status: RESOLVED | Noted: 2019-06-10 | Resolved: 2019-11-03

## 2019-11-03 PROBLEM — S22.49XA CLOSED FRACTURE OF MULTIPLE RIBS: Status: ACTIVE | Noted: 2019-07-11

## 2019-11-10 ENCOUNTER — TELEPHONE (OUTPATIENT)
Dept: FAMILY MEDICINE CLINIC | Facility: CLINIC | Age: 54
End: 2019-11-10

## 2019-11-10 DIAGNOSIS — E04.1 THYROID NODULE: Primary | ICD-10-CM
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
## 2019-11-18 ENCOUNTER — HOSPITAL ENCOUNTER (OUTPATIENT)
Dept: RADIOLOGY | Facility: HOSPITAL | Age: 54
Discharge: HOME | End: 2019-11-18
Attending: FAMILY MEDICINE
Payer: COMMERCIAL

## 2019-11-18 DIAGNOSIS — E04.1 THYROID NODULE: ICD-10-CM

## 2019-11-18 PROCEDURE — 76536 US EXAM OF HEAD AND NECK: CPT

## 2019-11-19 PROBLEM — E04.1 THYROID NODULE: Status: ACTIVE | Noted: 2019-11-19

## 2019-12-14 ENCOUNTER — OFFICE VISIT (OUTPATIENT)
Dept: FAMILY MEDICINE CLINIC | Facility: CLINIC | Age: 54
End: 2019-12-14
Payer: COMMERCIAL

## 2019-12-14 VITALS
HEART RATE: 76 BPM | BODY MASS INDEX: 31.56 KG/M2 | HEIGHT: 72 IN | DIASTOLIC BLOOD PRESSURE: 82 MMHG | SYSTOLIC BLOOD PRESSURE: 136 MMHG | TEMPERATURE: 98.8 F | WEIGHT: 233 LBS | RESPIRATION RATE: 16 BRPM

## 2019-12-14 DIAGNOSIS — L42 PITYRIASIS ROSEA: ICD-10-CM

## 2019-12-14 DIAGNOSIS — Z13.1 SCREENING FOR DIABETES MELLITUS (DM): ICD-10-CM

## 2019-12-14 DIAGNOSIS — Z13.89 SCREENING FOR CARDIOVASCULAR, RESPIRATORY, AND GENITOURINARY DISEASES: Primary | ICD-10-CM

## 2019-12-14 DIAGNOSIS — R73.01 IFG (IMPAIRED FASTING GLUCOSE): ICD-10-CM

## 2019-12-14 DIAGNOSIS — Z12.5 PROSTATE CANCER SCREENING: ICD-10-CM

## 2019-12-14 DIAGNOSIS — Z13.6 SCREENING FOR CARDIOVASCULAR, RESPIRATORY, AND GENITOURINARY DISEASES: Primary | ICD-10-CM

## 2019-12-14 DIAGNOSIS — Z13.83 SCREENING FOR CARDIOVASCULAR, RESPIRATORY, AND GENITOURINARY DISEASES: Primary | ICD-10-CM

## 2019-12-14 PROCEDURE — 3008F BODY MASS INDEX DOCD: CPT | Performed by: FAMILY MEDICINE

## 2019-12-14 PROCEDURE — 99214 OFFICE O/P EST MOD 30 MIN: CPT | Performed by: FAMILY MEDICINE

## 2019-12-14 PROCEDURE — 1036F TOBACCO NON-USER: CPT | Performed by: FAMILY MEDICINE

## 2019-12-14 RX ORDER — METHYLPREDNISOLONE 4 MG/1
TABLET ORAL
Qty: 21 TABLET | Refills: 0 | Status: SHIPPED | OUTPATIENT
Start: 2019-12-14 | End: 2019-12-20

## 2019-12-14 NOTE — PROGRESS NOTES
Assessment/Plan:    No problem-specific Assessment & Plan notes found for this encounter  Pityriasis rosea suspected  h1b prn  Medrol course  F/u if no better    Thyroid nodule on US reviewed with pt  F/u suggested in 1 year    phm labs for Jan given before cpe    ifg aware, diet suggested especially from now until upcoming labs    Bladder CA, sees urologist  Was referred there Jan 2019 due to elevated PSA velocity     Diagnoses and all orders for this visit:    Screening for cardiovascular, respiratory, and genitourinary diseases  -     Lipid Panel with Direct LDL reflex; Future  -     Lipid Panel with Direct LDL reflex    Screening for diabetes mellitus (DM)  -     Comprehensive metabolic panel; Future  -     Comprehensive metabolic panel    Prostate cancer screening  -     PSA, Total Screen; Future    IFG (impaired fasting glucose)  -     Hemoglobin A1C; Future  -     Hemoglobin A1C    Pityriasis rosea  -     methylPREDNISolone 4 MG tablet therapy pack; Use as directed on package        Return in about 6 weeks (around 1/27/2020) for Annual physical     Subjective:      Patient ID: Dominga Garza is a 47 y o  male  Chief Complaint   Patient presents with    Rash     He woke up this am a pruritic rash all over his body  He recently switched laundry detergents  [employer]       HPI    This am  Rash  Itchy  Spots over past 2w  All over  No exposures or foods or cosmetics  No fever  No new meds or herbals or supplements  Started on right thigh then appeared on trunk mostly after couple days  No sob or wheeze    Quit tob 2016    Thyroid nodule aware  Had US  Repeat US planned in 1 year    The following portions of the patient's history were reviewed and updated as appropriate: allergies, current medications, past family history, past medical history, past social history, past surgical history and problem list     Review of Systems   Constitutional: Negative for fever  HENT: Negative for trouble swallowing  Respiratory: Negative for shortness of breath and wheezing  Current Outpatient Medications   Medication Sig Dispense Refill    methylPREDNISolone 4 MG tablet therapy pack Use as directed on package 21 tablet 0    Pseudoephedrine-APAP-DM (DAYQUIL PO) Take by mouth       No current facility-administered medications for this visit  Objective:    /82   Pulse 76   Temp 98 8 °F (37 1 °C)   Resp 16   Ht 5' 11 75" (1 822 m)   Wt 106 kg (233 lb)   BMI 31 82 kg/m²        Physical Exam   Constitutional: He appears well-developed  HENT:   Head: Normocephalic  Right Ear: External ear normal    Left Ear: External ear normal    Mouth/Throat: No oropharyngeal exudate  Eyes: Conjunctivae are normal    Neck: Neck supple  No thyromegaly present  Cardiovascular: Normal rate, regular rhythm and intact distal pulses  Pulmonary/Chest: Effort normal and breath sounds normal  No respiratory distress  Abdominal: Soft  He exhibits no distension  Musculoskeletal: He exhibits no edema or deformity  Neurological: He is alert  He exhibits normal muscle tone  Skin: Skin is warm and dry  Rash noted  Dense macular rash on abdomen/back and less on proximal extremities, some ovals on trunk oriented along skin lines, no pustule or vesicle   Psychiatric: His behavior is normal  Thought content normal    Nursing note and vitals reviewed               Fredo Rosario DO

## 2019-12-15 ENCOUNTER — TELEPHONE (OUTPATIENT)
Dept: FAMILY MEDICINE CLINIC | Facility: CLINIC | Age: 54
End: 2019-12-15

## 2019-12-15 ENCOUNTER — TELEPHONE (OUTPATIENT)
Dept: OTHER | Facility: OTHER | Age: 54
End: 2019-12-15

## 2019-12-15 NOTE — TELEPHONE ENCOUNTER
Anita Ruiz 1965  Call Id: [id number]  Health Call  Standard Call Report  Caller Name:  Relationship To  Patient: Self  Return Phone Number: [phone])  Presenting Problem: "I have a rash that is getting worse  I saw Dr Maritza Alonzo yesterday "  Nurse Assessment  Nurse: Sheri Cormier Date/Time: 12/15/2019 8:40:02 AM  Type of assessment required:  ---General (Adult or Child)  Duration of Current S/S  ---2 days  Woke up with the rash on 12/14/2019  Location/Radiation  ---Bilateral lower extremities and arms, neck, face, chest and back  Temperature (F) and route:  ---Denied  Symptom Specific Meds (Dose/Time):  ---methylprednisone dose pack was started on 12/14/2019: 1st dose @ 1100, 2nd @  1430, 3rd dose @ 1800 and 4th dose @ 2200, 5th dose @ 0800 today  Other S/S  ---description of rash: raised, dark pink, blotches of pinhead to eraser sized spots  Rash  has become itchy  Face is swollen, even around eyes  Face feels like he had novocaine  Denies pain  Denies exposure to changes in environment, ingestion of new foods or  medications  Pain Scale on scale of 1-10, 10 being the worst:  ---Denied  Symptom progression:  ---worse  Intake and Output  ---Normal appetite and fluid intake  Urinated this morning  Last Exam/Treatment:  ---12/14/2019 / saw Dr Maritza Alonzo for rash  Protocols  Protocol Title Nurse Date/Time  Rash or Redness - Widespread EULOGIO Winters Adelle Chase 12/15/2019 8:46:42 AM  Question Caller Affirmed  Disp  Time Disposition Final User  12/15/2019 9:06:20 AM See Physician within 4 Hours (or PCP  triage)  EULOGIO iWnters Adelle Chase  12/15/2019 9:06:28 AM RN Triaged Yes EULOGIO Winters, SHELLEY MENDOZA  [facility] Advice Given Per Protocol  SEE PHYSICIAN WITHIN 4 HOURS (or PCP triage): * IF OFFICE WILL BE CLOSED AND NO PCP TRIAGE: You need to be seen  within the next 3 or 4 hours  A nearby Urgent Care Center is often a good source of care  Another choice is to go to the ER  Go sooner  if you become worse   BRING MEDICINES: * Please bring a list of your current medicines when you go to see the doctor  * It is also  a good idea to bring the pill bottles too  This will help the doctor to make certain you are taking the right medicines and the right dose  CALL BACK IF: * You become worse  CARE ADVICE given per Rash - Widespread and Cause Unknown (Adult) guideline  Patient  stated that Dr Ranjana Moulton told him to call if his symptoms became worse  I will page MD for disposition advice prior to sending him to ER  or Urgent Care per patient's request  Dr Ranjana Moulton was paged via [facility] @ [phone]: [phone]/ Cj Gaytan RN/ HealthCall/ Berta Comanche 1965/ rash is worse, need to discuss disposition  Patient was advised on homecare for itchy rash: taking a lukewarm bath  with 2 oz of baking soda mixed in water, rubbing an ice cube over very itchy areas for a couple minutes or applying hydrocortisone cream  to itchy spots  Dr Ranjana Moulton returned call @ 3167 and requested to speak with patient  I conferenced patient into the call    Caller Understands: Yes  Caller Disagree/Comply: Comply  PreDisposition: Unsure

## 2019-12-16 NOTE — TELEPHONE ENCOUNTER
Called patient and he stated that the rash looks like its getting better    Yeny Souza MA  I told him if it gets worse to give us a call back  Sending for [facility]

## 2020-01-04 ENCOUNTER — OFFICE VISIT (OUTPATIENT)
Dept: URGENT CARE | Facility: CLINIC | Age: 55
End: 2020-01-04
Payer: COMMERCIAL

## 2020-01-04 ENCOUNTER — TELEPHONE (OUTPATIENT)
Dept: FAMILY MEDICINE CLINIC | Facility: CLINIC | Age: 55
End: 2020-01-04

## 2020-01-04 VITALS
SYSTOLIC BLOOD PRESSURE: 164 MMHG | WEIGHT: 237 LBS | HEART RATE: 110 BPM | BODY MASS INDEX: 32.1 KG/M2 | HEIGHT: 72 IN | OXYGEN SATURATION: 98 % | TEMPERATURE: 102 F | RESPIRATION RATE: 18 BRPM | DIASTOLIC BLOOD PRESSURE: 88 MMHG

## 2020-01-04 DIAGNOSIS — J11.1 INFLUENZA: Primary | ICD-10-CM

## 2020-01-04 PROCEDURE — 99213 OFFICE O/P EST LOW 20 MIN: CPT | Performed by: PHYSICIAN ASSISTANT

## 2020-01-04 PROCEDURE — 3725F SCREEN DEPRESSION PERFORMED: CPT | Performed by: PHYSICIAN ASSISTANT

## 2020-01-04 RX ORDER — OSELTAMIVIR PHOSPHATE 75 MG/1
75 CAPSULE ORAL EVERY 12 HOURS SCHEDULED
Qty: 10 CAPSULE | Refills: 0 | Status: SHIPPED | OUTPATIENT
Start: 2020-01-04 | End: 2020-01-09

## 2020-01-04 RX ORDER — ONDANSETRON 4 MG/1
4 TABLET, FILM COATED ORAL EVERY 8 HOURS PRN
Qty: 20 TABLET | Refills: 0 | Status: SHIPPED | OUTPATIENT
Start: 2020-01-04 | End: 2020-01-28

## 2020-01-04 NOTE — PATIENT INSTRUCTIONS
Most likely influenza, discussed send out test, patient declined  Rx tamiflu twice daily x 5 days sent via EMR  Rx zofran sent via EMR as needed for nausea/vomiting related to tamiflu  Recommend tylenol/ibuprofen as needed for pain/fever  Rest, fluids, and supportive care  Follow up with PCP in 3-5 days  Proceed to  ER if symptoms worsen

## 2020-01-04 NOTE — TELEPHONE ENCOUNTER
Nima Gonzalez is calling stating he is getting a rash again on his face  He used the cream that dr Radha Luque has prescribed but does not seem to be helping    Can we please call patient back and offer an apt for Monday or if need to be seen sooner urgent care    Thank you

## 2020-01-04 NOTE — PROGRESS NOTES
[facility]      NAME: Wade Eckert is a 47 y o  male  : 1965    MRN: [id number]  DATE: 2020  TIME: 10:12 PM    Assessment and Plan   Influenza [J11 1]  1  Influenza  oseltamivir (TAMIFLU) 75 mg capsule    ondansetron (ZOFRAN) 4 mg tablet         Patient Instructions   Most likely influenza, discussed send out test, patient declined  Rx tamiflu twice daily x 5 days sent via EMR  Rx zofran sent via EMR as needed for nausea/vomiting related to tamiflu  Recommend tylenol/ibuprofen as needed for pain/fever  Rest, fluids, and supportive care  Follow up with PCP in 3-5 days  Proceed to  ER if symptoms worsen  Chief Complaint     Chief Complaint   Patient presents with    Fever     Pt reports of fever with cough, congestion chills and body aches  History of Present Illness       Taffy Goltz is a 71-year-old male who presents to clinic complaining of productive cough, myalgias, and fatigue x1 day  Patient states that he woke up this morning with a productive cough producing white the yellow tinged phlegm  He is also felt feverish alternating with chills as well as fatigue and myalgias  He denies any nausea, vomiting, sore throat, nasal congestion, ear pain, sinus pain, shortness of breath, or chest pain  Review of Systems   Review of Systems   Constitutional: Positive for chills, fatigue and fever  HENT: Negative for congestion, ear pain, sinus pressure, sinus pain and sore throat  Respiratory: Positive for cough  Negative for chest tightness and shortness of breath  Cardiovascular: Negative for chest pain  Musculoskeletal: Positive for myalgias  Neurological: Positive for headaches         Current Medications       Current Outpatient Medications:     ondansetron (ZOFRAN) 4 mg tablet, Take 1 tablet (4 mg total) by mouth every 8 (eight) hours as needed for nausea or vomiting, Disp: 20 tablet, Rfl: 0    oseltamivir (TAMIFLU) 75 mg capsule, Take 1 capsule (75 mg total) by mouth every 12 (twelve) hours for 5 days, Disp: 10 capsule, Rfl: 0    Pseudoephedrine-APAP-DM (DAYQUIL PO), Take by mouth, Disp: , Rfl:     Current Allergies     Allergies as of 01/04/2020    (No Known Allergies)            The following portions of the patient's history were reviewed and updated as appropriate: allergies, current medications, past family history, past medical history, past social history, past surgical history and problem list      History reviewed  No pertinent past medical history  Past Surgical History:   Procedure Laterality Date    CHOLECYSTECTOMY      HERNIA REPAIR      bilateral linguinal mesh    NASAL SEPTUM SURGERY      NEUROPLASTY / TRANSPOSITION ULNAR NERVE AT ELBOW Left     TN COLONOSCOPY FLX DX W/COLLJ SPEC WHEN PFRMD N/A 3/11/2016    Procedure: COLONOSCOPY;  Surgeon: Shimon Smith MD;  Location: [place] [address] [facility]; Service: Gastroenterology       History reviewed  No pertinent family history  Medications have been verified  Objective   /88   Pulse (!) 110   Temp (!) 102 °F (38 9 °C)   Resp 18   Ht 6' (1 829 m)   Wt 108 kg (237 lb)   SpO2 98%   BMI 32 14 kg/m²        Physical Exam     Physical Exam   Constitutional: He is oriented to person, place, and time  He appears well-developed and well-nourished  No distress  HENT:   Right Ear: Tympanic membrane, external ear and ear canal normal    Left Ear: Tympanic membrane, external ear and ear canal normal    Nose: Nose normal  Right sinus exhibits no maxillary sinus tenderness and no frontal sinus tenderness  Left sinus exhibits no maxillary sinus tenderness and no frontal sinus tenderness  Mouth/Throat: Uvula is midline and oropharynx is clear and moist  No oropharyngeal exudate, posterior oropharyngeal edema or posterior oropharyngeal erythema  No tonsillar exudate  Cardiovascular: Normal rate, regular rhythm and normal heart sounds     Pulmonary/Chest: Effort normal and breath sounds normal    Lymphadenopathy:     He has no cervical adenopathy  Neurological: He is alert and oriented to person, place, and time  Psychiatric: He has a normal mood and affect  Nursing note and vitals reviewed

## 2020-01-04 NOTE — TELEPHONE ENCOUNTER
Pt stated he is currently at urgent care for something else, he will mention it to them and see if they can help him out  [facility]

## 2020-01-23 LAB
ALBUMIN SERPL-MCNC: 4.9 G/DL (ref 3.8–4.9)
ALBUMIN/GLOB SERPL: 2 {RATIO} (ref 1.2–2.2)
ALP SERPL-CCNC: 68 IU/L (ref 39–117)
ALT SERPL-CCNC: 26 IU/L (ref 0–44)
AST SERPL-CCNC: 24 IU/L (ref 0–40)
BILIRUB SERPL-MCNC: 0.5 MG/DL (ref 0–1.2)
BUN SERPL-MCNC: 14 MG/DL (ref 6–24)
BUN/CREAT SERPL: 12 (ref 9–20)
CALCIUM SERPL-MCNC: 9.8 MG/DL (ref 8.7–10.2)
CHLORIDE SERPL-SCNC: 103 MMOL/L (ref 96–106)
CHOLEST SERPL-MCNC: 247 MG/DL (ref 100–199)
CO2 SERPL-SCNC: 22 MMOL/L (ref 20–29)
CREAT SERPL-MCNC: 1.16 MG/DL (ref 0.76–1.27)
EST. AVERAGE GLUCOSE BLD GHB EST-MCNC: 117 MG/DL
GLOBULIN SER-MCNC: 2.4 G/DL (ref 1.5–4.5)
GLUCOSE SERPL-MCNC: 102 MG/DL (ref 65–99)
HBA1C MFR BLD: 5.7 % (ref 4.8–5.6)
HDLC SERPL-MCNC: 52 MG/DL
LDLC SERPL CALC-MCNC: 169 MG/DL (ref 0–99)
LDLC/HDLC SERPL: 3.3 RATIO (ref 0–3.6)
MICRODELETION SYND BLD/T FISH: NORMAL
POTASSIUM SERPL-SCNC: 4.5 MMOL/L (ref 3.5–5.2)
PROT SERPL-MCNC: 7.3 G/DL (ref 6–8.5)
PSA SERPL-MCNC: 2.6 NG/ML (ref 0–4)
SL AMB EGFR AFRICAN AMERICAN: 82 ML/MIN/1.73
SL AMB EGFR NON AFRICAN AMERICAN: 71 ML/MIN/1.73
SL AMB VLDL CHOLESTEROL CALC: 26 MG/DL (ref 5–40)
SODIUM SERPL-SCNC: 141 MMOL/L (ref 134–144)
TRIGL SERPL-MCNC: 132 MG/DL (ref 0–149)

## 2020-01-23 PROCEDURE — 3044F HG A1C LEVEL LT 7.0%: CPT | Performed by: FAMILY MEDICINE

## 2020-01-28 ENCOUNTER — OFFICE VISIT (OUTPATIENT)
Dept: FAMILY MEDICINE CLINIC | Facility: CLINIC | Age: 55
End: 2020-01-28
Payer: COMMERCIAL

## 2020-01-28 VITALS
TEMPERATURE: 97.7 F | RESPIRATION RATE: 16 BRPM | SYSTOLIC BLOOD PRESSURE: 130 MMHG | BODY MASS INDEX: 31.02 KG/M2 | HEART RATE: 71 BPM | OXYGEN SATURATION: 98 % | DIASTOLIC BLOOD PRESSURE: 84 MMHG | WEIGHT: 229 LBS | HEIGHT: 72 IN

## 2020-01-28 DIAGNOSIS — R73.01 IFG (IMPAIRED FASTING GLUCOSE): ICD-10-CM

## 2020-01-28 DIAGNOSIS — Z00.00 HEALTHCARE MAINTENANCE: Primary | ICD-10-CM

## 2020-01-28 DIAGNOSIS — L30.9 ECZEMA, UNSPECIFIED TYPE: ICD-10-CM

## 2020-01-28 DIAGNOSIS — E04.1 THYROID NODULE: ICD-10-CM

## 2020-01-28 PROBLEM — Z23 NEED FOR VACCINATION: Status: ACTIVE | Noted: 2020-01-28

## 2020-01-28 PROBLEM — L42 PITYRIASIS ROSEA: Status: RESOLVED | Noted: 2019-12-14 | Resolved: 2020-01-28

## 2020-01-28 PROBLEM — R03.0 ELEVATED BP WITHOUT DIAGNOSIS OF HYPERTENSION: Status: RESOLVED | Noted: 2019-01-23 | Resolved: 2020-01-28

## 2020-01-28 PROBLEM — R97.20 INCREASED PROSTATE SPECIFIC ANTIGEN (PSA) VELOCITY: Status: RESOLVED | Noted: 2019-01-23 | Resolved: 2020-01-28

## 2020-01-28 PROCEDURE — 99396 PREV VISIT EST AGE 40-64: CPT | Performed by: FAMILY MEDICINE

## 2020-01-28 NOTE — PROGRESS NOTES
Assessment/Plan:    No problem-specific Assessment & Plan notes found for this encounter  cpe today    Declines statin  fram score aware  Successful thus far with tob cessation  Thyroid nodule, f/u 1 year with US  Eczema, leg, unchanged, seeing dermatologist  Prediabetes aware, diet advised     Diagnoses and all orders for this visit:    Healthcare maintenance    Eczema, unspecified type    Thyroid nodule    IFG (impaired fasting glucose)    Other orders  -     Cancel: influenza vaccine, [id number], quadrivalent, recombinant, PF, 0 5 mL, for patients 18 yr+ (FLUBLOK)        Return if symptoms worsen or fail to improve  Subjective:      Patient ID: Harsh Ortega is a 47 y o  male  Chief Complaint   Patient presents with    Physical Exam     [place]        HPI  Had labs  Has lost some wt  No exercise  Stress at work  Has elliptical not being used  Last cig April 2016    The following portions of the patient's history were reviewed and updated as appropriate: allergies, current medications, past family history, past medical history, past social history, past surgical history and problem list     Review of Systems   Respiratory: Negative for shortness of breath  Cardiovascular: Negative for chest pain  Current Outpatient Medications   Medication Sig Dispense Refill    Pseudoephedrine-APAP-DM (DAYQUIL PO) Take by mouth       No current facility-administered medications for this visit  Objective:    /84   Pulse 71   Temp 97 7 °F (36 5 °C)   Resp 16   Ht 5' 11 5" (1 816 m)   Wt 104 kg (229 lb)   SpO2 98%   BMI 31 49 kg/m²        Physical Exam   Constitutional: He appears well-developed  No distress  HENT:   Head: Normocephalic  Right Ear: External ear normal    Left Ear: External ear normal    Mouth/Throat: No oropharyngeal exudate  Eyes: Conjunctivae are normal  No scleral icterus  Neck: Neck supple  No thyromegaly present     Cardiovascular: Normal rate, regular rhythm and intact distal pulses  No murmur heard  Pulmonary/Chest: Effort normal and breath sounds normal  No respiratory distress  He has no wheezes  Abdominal: Soft  Bowel sounds are normal  He exhibits no distension  There is no tenderness  No hernia  Genitourinary: Rectum normal, prostate normal and penis normal    Musculoskeletal: He exhibits no edema or deformity  Neurological: He is alert  He exhibits normal muscle tone  Skin: Skin is warm and dry  No pallor  Psychiatric: His behavior is normal  Thought content normal    Nursing note and vitals reviewed  BMI Counseling: Body mass index is 31 49 kg/m²  The BMI is above normal  Nutrition recommendations include moderation in carbohydrate intake  Exercise recommendations include exercising 3-5 times per week  No pharmacotherapy was ordered             Erick DO Mauricio

## 2020-01-31 ENCOUNTER — OFFICE VISIT (OUTPATIENT)
Dept: FAMILY MEDICINE CLINIC | Facility: CLINIC | Age: 55
End: 2020-01-31
Payer: COMMERCIAL

## 2020-01-31 VITALS
WEIGHT: 235 LBS | OXYGEN SATURATION: 98 % | HEART RATE: 96 BPM | BODY MASS INDEX: 31.83 KG/M2 | DIASTOLIC BLOOD PRESSURE: 88 MMHG | RESPIRATION RATE: 16 BRPM | TEMPERATURE: 98.5 F | HEIGHT: 72 IN | SYSTOLIC BLOOD PRESSURE: 142 MMHG

## 2020-01-31 DIAGNOSIS — J01.00 ACUTE NON-RECURRENT MAXILLARY SINUSITIS: Primary | ICD-10-CM

## 2020-01-31 PROCEDURE — 3008F BODY MASS INDEX DOCD: CPT | Performed by: FAMILY MEDICINE

## 2020-01-31 PROCEDURE — 99213 OFFICE O/P EST LOW 20 MIN: CPT | Performed by: FAMILY MEDICINE

## 2020-01-31 PROCEDURE — 1036F TOBACCO NON-USER: CPT | Performed by: FAMILY MEDICINE

## 2020-01-31 RX ORDER — CEFDINIR 300 MG/1
600 CAPSULE ORAL DAILY
Qty: 20 CAPSULE | Refills: 0 | Status: SHIPPED | OUTPATIENT
Start: 2020-01-31 | End: 2022-01-10 | Stop reason: SDUPTHER

## 2020-01-31 NOTE — PROGRESS NOTES
Assessment/Plan:    No problem-specific Assessment & Plan notes found for this encounter  Uri/sinusitis  abx if no better  Gargle, otc, mucinex     Diagnoses and all orders for this visit:    Acute non-recurrent maxillary sinusitis  -     cefdinir (OMNICEF) 300 mg capsule; Take 2 capsules (600 mg total) by mouth daily for 10 days        Return if symptoms worsen or fail to improve  Subjective:      Patient ID: Harsh Ortega is a 47 y o  male  Chief Complaint   Patient presents with    Sinus Problem     symptoms started tuesday  vfrma    Nasal Congestion    Headache    Earache       HPI   uri sx  2d  Sinus pressure  Severe ear pain  Sore throat  Chest congestion  Headache  Mucus is beige  No fever  Daughter Shahbaz Gauthier  Wife is ok    The following portions of the patient's history were reviewed and updated as appropriate: allergies, current medications, past family history, past medical history, past social history, past surgical history and problem list     Review of Systems   Respiratory: Negative for shortness of breath  Neurological: Positive for headaches  Current Outpatient Medications   Medication Sig Dispense Refill    cefdinir (OMNICEF) 300 mg capsule Take 2 capsules (600 mg total) by mouth daily for 10 days 20 capsule 0    Pseudoephedrine-APAP-DM (DAYQUIL PO) Take by mouth       No current facility-administered medications for this visit  Objective:    /88   Pulse 96   Temp 98 5 °F (36 9 °C)   Resp 16   Ht 5' 11 5" (1 816 m)   Wt 107 kg (235 lb)   SpO2 98%   BMI 32 32 kg/m²        Physical Exam   Constitutional: He appears well-developed  No distress  HENT:   Head: Normocephalic  Right Ear: External ear normal    Left Ear: External ear normal    Sinuses tender to percussion, nasal turbinates visualized and appear red and swollen     Eyes: Conjunctivae are normal  No scleral icterus  Neck: Neck supple     Cardiovascular: Normal rate, regular rhythm and intact distal pulses  No murmur heard  Pulmonary/Chest: Effort normal and breath sounds normal  No respiratory distress  He has no wheezes  Abdominal: Soft  Bowel sounds are normal  There is no tenderness  Musculoskeletal: He exhibits no edema or deformity  Neurological: He is alert  Skin: Skin is warm and dry  No pallor  Psychiatric: His behavior is normal  Thought content normal    Nursing note and vitals reviewed               Ingrid Hernandez, DO

## 2020-03-17 ENCOUNTER — OFFICE VISIT (OUTPATIENT)
Dept: FAMILY MEDICINE CLINIC | Facility: CLINIC | Age: 55
End: 2020-03-17
Payer: COMMERCIAL

## 2020-03-17 VITALS
SYSTOLIC BLOOD PRESSURE: 138 MMHG | RESPIRATION RATE: 16 BRPM | TEMPERATURE: 98.9 F | HEART RATE: 56 BPM | DIASTOLIC BLOOD PRESSURE: 80 MMHG | WEIGHT: 234 LBS | HEIGHT: 72 IN | BODY MASS INDEX: 31.69 KG/M2

## 2020-03-17 DIAGNOSIS — C67.9 BLADDER CARCINOMA (HCC): ICD-10-CM

## 2020-03-17 DIAGNOSIS — E04.1 THYROID NODULE: ICD-10-CM

## 2020-03-17 DIAGNOSIS — R10.11 RIGHT UPPER QUADRANT ABDOMINAL PAIN: ICD-10-CM

## 2020-03-17 DIAGNOSIS — Q27.9 VENOUS ANOMALY: Primary | ICD-10-CM

## 2020-03-17 PROBLEM — J01.00 ACUTE NON-RECURRENT MAXILLARY SINUSITIS: Status: RESOLVED | Noted: 2020-01-31 | Resolved: 2020-03-17

## 2020-03-17 PROCEDURE — 99214 OFFICE O/P EST MOD 30 MIN: CPT | Performed by: FAMILY MEDICINE

## 2020-03-17 PROCEDURE — 3008F BODY MASS INDEX DOCD: CPT | Performed by: FAMILY MEDICINE

## 2020-03-17 PROCEDURE — 1036F TOBACCO NON-USER: CPT | Performed by: FAMILY MEDICINE
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
## 2020-03-18 ENCOUNTER — TELEPHONE (OUTPATIENT)
Dept: FAMILY MEDICINE CLINIC | Facility: CLINIC | Age: 55
End: 2020-03-18

## 2020-03-18 DIAGNOSIS — R10.9 ABDOMINAL PAIN, UNSPECIFIED ABDOMINAL LOCATION: Primary | ICD-10-CM

## 2020-03-18 NOTE — TELEPHONE ENCOUNTER
Chi Celeste from [facility] called and stated that they are trying to schedule patient for his US but need to clarify one of the [address] orders  The US Upper Right quadrant needs to be reorderd  Can we please order it as     US of Abdomen complete        Please fax to [phone]  Attention: Chi Celeste

## 2020-12-29 DIAGNOSIS — L30.9 ECZEMA, UNSPECIFIED TYPE: Primary | ICD-10-CM

## 2020-12-30 ENCOUNTER — TELEPHONE (OUTPATIENT)
Dept: FAMILY MEDICINE CLINIC | Facility: CLINIC | Age: 55
End: 2020-12-30

## 2020-12-30 DIAGNOSIS — Z13.1 SCREENING FOR DIABETES MELLITUS (DM): ICD-10-CM

## 2020-12-30 DIAGNOSIS — Z12.5 PROSTATE CANCER SCREENING: ICD-10-CM

## 2020-12-30 DIAGNOSIS — Z13.89 SCREENING FOR CARDIOVASCULAR, RESPIRATORY, AND GENITOURINARY DISEASES: Primary | ICD-10-CM

## 2020-12-30 DIAGNOSIS — R73.01 IFG (IMPAIRED FASTING GLUCOSE): ICD-10-CM

## 2020-12-30 DIAGNOSIS — Z13.6 SCREENING FOR CARDIOVASCULAR, RESPIRATORY, AND GENITOURINARY DISEASES: Primary | ICD-10-CM

## 2020-12-30 DIAGNOSIS — Z13.83 SCREENING FOR CARDIOVASCULAR, RESPIRATORY, AND GENITOURINARY DISEASES: Primary | ICD-10-CM

## 2020-12-30 DIAGNOSIS — E04.1 THYROID NODULE: ICD-10-CM

## 2021-01-25 ENCOUNTER — HOSPITAL ENCOUNTER (OUTPATIENT)
Dept: RADIOLOGY | Facility: HOSPITAL | Age: 56
Discharge: HOME | End: 2021-01-25
Attending: FAMILY MEDICINE
Payer: COMMERCIAL

## 2021-01-25 DIAGNOSIS — E04.1 THYROID NODULE: ICD-10-CM

## 2021-01-25 PROCEDURE — 76536 US EXAM OF HEAD AND NECK: CPT

## 2021-01-26 LAB
ALBUMIN SERPL-MCNC: 4.5 G/DL (ref 3.8–4.9)
ALBUMIN/GLOB SERPL: 2 {RATIO} (ref 1.2–2.2)
ALP SERPL-CCNC: 72 IU/L (ref 39–117)
ALT SERPL-CCNC: 27 IU/L (ref 0–44)
AST SERPL-CCNC: 21 IU/L (ref 0–40)
BILIRUB SERPL-MCNC: 0.5 MG/DL (ref 0–1.2)
BUN SERPL-MCNC: 12 MG/DL (ref 6–24)
BUN/CREAT SERPL: 10 (ref 9–20)
CALCIUM SERPL-MCNC: 9.5 MG/DL (ref 8.7–10.2)
CHLORIDE SERPL-SCNC: 103 MMOL/L (ref 96–106)
CHOLEST SERPL-MCNC: 241 MG/DL (ref 100–199)
CO2 SERPL-SCNC: 22 MMOL/L (ref 20–29)
CREAT SERPL-MCNC: 1.16 MG/DL (ref 0.76–1.27)
EST. AVERAGE GLUCOSE BLD GHB EST-MCNC: 123 MG/DL
GLOBULIN SER-MCNC: 2.2 G/DL (ref 1.5–4.5)
GLUCOSE SERPL-MCNC: 103 MG/DL (ref 65–99)
HBA1C MFR BLD: 5.9 % (ref 4.8–5.6)
HDLC SERPL-MCNC: 45 MG/DL
LDLC SERPL CALC-MCNC: 170 MG/DL (ref 0–99)
LDLC/HDLC SERPL: 3.8 RATIO (ref 0–3.6)
MICRODELETION SYND BLD/T FISH: NORMAL
POTASSIUM SERPL-SCNC: 4.5 MMOL/L (ref 3.5–5.2)
PROT SERPL-MCNC: 6.7 G/DL (ref 6–8.5)
PSA SERPL-MCNC: 1.7 NG/ML (ref 0–4)
SL AMB EGFR AFRICAN AMERICAN: 81 ML/MIN/1.73
SL AMB EGFR NON AFRICAN AMERICAN: 70 ML/MIN/1.73
SL AMB VLDL CHOLESTEROL CALC: 26 MG/DL (ref 5–40)
SODIUM SERPL-SCNC: 142 MMOL/L (ref 134–144)
TRIGL SERPL-MCNC: 145 MG/DL (ref 0–149)
TSH SERPL DL<=0.005 MIU/L-ACNC: 1.92 UIU/ML (ref 0.45–4.5)

## 2021-02-16 ENCOUNTER — TELEPHONE (OUTPATIENT)
Dept: FAMILY MEDICINE CLINIC | Facility: CLINIC | Age: 56
End: 2021-02-16

## 2021-02-16 NOTE — TELEPHONE ENCOUNTER
Dr BARBER,    I received a fax for [facility] for clearance for his Left Partial nephrectomy scheduled for 3/11/2021  He does not have a pre op scheduled but he has a CPE scheduled with you on 2/23  Will you do the pre op clearance at that visit? I put the form in your folder   His pre op labs and EKG, MRI results are included JMoylNisreen

## 2021-02-20 PROBLEM — S22.49XA CLOSED FRACTURE OF MULTIPLE RIBS: Status: RESOLVED | Noted: 2019-07-11 | Resolved: 2021-02-20

## 2021-02-20 PROBLEM — Z23 NEED FOR VACCINATION: Status: RESOLVED | Noted: 2020-01-28 | Resolved: 2021-02-20

## 2021-02-23 ENCOUNTER — OFFICE VISIT (OUTPATIENT)
Dept: FAMILY MEDICINE CLINIC | Facility: CLINIC | Age: 56
End: 2021-02-23
Payer: COMMERCIAL

## 2021-02-23 VITALS
TEMPERATURE: 99 F | OXYGEN SATURATION: 98 % | WEIGHT: 239 LBS | HEART RATE: 72 BPM | BODY MASS INDEX: 32.37 KG/M2 | RESPIRATION RATE: 18 BRPM | SYSTOLIC BLOOD PRESSURE: 140 MMHG | DIASTOLIC BLOOD PRESSURE: 70 MMHG | HEIGHT: 72 IN

## 2021-02-23 DIAGNOSIS — R73.03 PREDIABETES: ICD-10-CM

## 2021-02-23 DIAGNOSIS — C67.9 BLADDER CARCINOMA (HCC): ICD-10-CM

## 2021-02-23 DIAGNOSIS — I10 ESSENTIAL HYPERTENSION: ICD-10-CM

## 2021-02-23 DIAGNOSIS — N28.9 LESION OF LEFT NATIVE KIDNEY: Primary | ICD-10-CM

## 2021-02-23 PROBLEM — R10.11 RIGHT UPPER QUADRANT ABDOMINAL PAIN: Status: RESOLVED | Noted: 2020-03-17 | Resolved: 2021-02-23

## 2021-02-23 PROBLEM — Z82.49 FAMILY HISTORY OF CORONARY ARTERIOSCLEROSIS: Status: ACTIVE | Noted: 2021-02-19

## 2021-02-23 PROCEDURE — 3725F SCREEN DEPRESSION PERFORMED: CPT | Performed by: FAMILY MEDICINE

## 2021-02-23 PROCEDURE — 1036F TOBACCO NON-USER: CPT | Performed by: FAMILY MEDICINE

## 2021-02-23 PROCEDURE — 99243 OFF/OP CNSLTJ NEW/EST LOW 30: CPT | Performed by: FAMILY MEDICINE

## 2021-02-23 PROCEDURE — 3008F BODY MASS INDEX DOCD: CPT | Performed by: FAMILY MEDICINE

## 2021-02-23 RX ORDER — LOSARTAN POTASSIUM 25 MG/1
25 TABLET ORAL EVERY MORNING
COMMUNITY
Start: 2021-02-19

## 2021-02-24 NOTE — PROGRESS NOTES
Assessment/Plan:    No problem-specific Assessment & Plan notes found for this encounter  Diagnoses and all orders for this visit:    Lesion of left native kidney    Prediabetes    Bladder carcinoma ([address] )    Essential hypertension    Other orders  -     losartan (COZAAR) 25 mg tablet; Take 25 mg by mouth every morning        Lesion left kidney, medically cleared for surgery though cardiology clearance pending with echocardiogram scheduled for further eval of equivocal ekg findiings    Hypertension is improving on losartan 25mg/d    Hyperlipidemia, may need treatment in future, results discussed    Prediabetes, work on diet/carbs/exercise in future      No follow-ups on file  Subjective:      Patient ID: Rafael Corrales is a 54 y o  male  Chief Complaint   Patient presents with    Pre-op Exam     Left partial nephrectomy 3/11/21  [facility]       HPI    Planned procedure: left partial nephrectomy  Surgeon: Dr Gabriel Wolf  Date: 3/11/21  Anesthesia type: unstated    preop labs ok, cxr wnl, ekg equivocal so cardiology consult done and echo pending    Prior major surgeries: cholecystectomy, b/l inguinal herniae, ulnar nerve surgery  Problems with anesthesia in past: post anesthesia high irritability    Can walk 4 blocks or 2 flights of stairs: y  History of excessive bleeding: n  History of excessive clotting: n  Personal history of DVT or Pe: n    Taking NSAIDS: n  Takings vitamins D or E or A or fish oil supplements: n    Usual am medications: losartan 25mg/d since 2/21/21    The following portions of the patient's history were reviewed and updated as appropriate: allergies, current medications, past family history, past medical history, past social history, past surgical history and problem list     Review of Systems   Respiratory: Negative for shortness of breath  Cardiovascular: Negative for chest pain  Neurological: Negative for seizures and syncope           Current Outpatient Medications   Medication Sig Dispense Refill    halobetasol (ULTRAVATE) 0 05 % cream Apply topically 2 (two) times a day , short term 50 g 0    losartan (COZAAR) 25 mg tablet Take 25 mg by mouth every morning      Pseudoephedrine-APAP-DM (DAYQUIL PO) Take by mouth       No current facility-administered medications for this visit  Objective:    /70   Pulse 72   Temp 99 °F (37 2 °C)   Resp 18   Ht 5' 11 5" (1 816 m)   Wt 108 kg (239 lb)   SpO2 98%   BMI 32 87 kg/m²        Physical Exam  Vitals signs and nursing note reviewed  Constitutional:       Appearance: He is well-developed  He is not ill-appearing  HENT:      Head: Normocephalic  Right Ear: Tympanic membrane normal       Left Ear: Tympanic membrane normal    Eyes:      General: No scleral icterus  Conjunctiva/sclera: Conjunctivae normal    Neck:      Musculoskeletal: Neck supple  Cardiovascular:      Rate and Rhythm: Normal rate and regular rhythm  Heart sounds: No murmur  Pulmonary:      Effort: Pulmonary effort is normal  No respiratory distress  Breath sounds: No wheezing or rales  Abdominal:      Palpations: Abdomen is soft  Musculoskeletal:         General: No deformity  Skin:     General: Skin is warm and dry  Coloration: Skin is not jaundiced or pale  Neurological:      Mental Status: He is alert  Motor: No weakness  Gait: Gait normal    Psychiatric:         Behavior: Behavior normal          Thought Content:  Thought content normal                 Abbie Nathan DO

## 2021-04-03 ENCOUNTER — APPOINTMENT (OUTPATIENT)
Dept: LAB | Facility: HOSPITAL | Age: 56
End: 2021-04-03
Payer: COMMERCIAL

## 2021-04-03 ENCOUNTER — TELEMEDICINE (OUTPATIENT)
Dept: FAMILY MEDICINE CLINIC | Facility: CLINIC | Age: 56
End: 2021-04-03
Payer: COMMERCIAL

## 2021-04-03 DIAGNOSIS — Z20.822 EXPOSURE TO COVID-19 VIRUS: ICD-10-CM

## 2021-04-03 DIAGNOSIS — Z20.822 EXPOSURE TO COVID-19 VIRUS: Primary | ICD-10-CM

## 2021-04-03 PROCEDURE — 99213 OFFICE O/P EST LOW 20 MIN: CPT | Performed by: FAMILY MEDICINE

## 2021-04-03 PROCEDURE — U0005 INFEC AGEN DETEC AMPLI PROBE: HCPCS

## 2021-04-03 PROCEDURE — U0003 INFECTIOUS AGENT DETECTION BY NUCLEIC ACID (DNA OR RNA); SEVERE ACUTE RESPIRATORY SYNDROME CORONAVIRUS 2 (SARS-COV-2) (CORONAVIRUS DISEASE [COVID-19]), AMPLIFIED PROBE TECHNIQUE, MAKING USE OF HIGH THROUGHPUT TECHNOLOGIES AS DESCRIBED BY CMS-2020-01-R: HCPCS

## 2021-04-03 NOTE — PROGRESS NOTES
Virtual Regular Visit      Assessment/Plan:    Problem List Items Addressed This Visit     None      Visit Diagnoses     Exposure to COVID-19 virus    -  Primary    Relevant Orders    Novel Coronavirus (Covid-19),PCR [facility] - Collected at [facility] or [facility]        Pt was advised on self isolation till I have results  Advised on supplements  Covid testing ordered         Reason for visit is   Chief Complaint   Patient presents with    Virtual Regular Visit        Encounter provider Mehnaz Christopher DO    Provider located at  [address]  [address] [facility]  [address]  [place] [address]      Recent Visits  No visits were found meeting these conditions  Showing recent visits within past 7 days and meeting all other requirements     Today's Visits  Date Type Provider Dept   04/03/21 [address], [address] today's visits and meeting all other requirements     Future Appointments  No visits were found meeting these conditions  Showing future appointments within next 150 days and meeting all other requirements        The patient was identified by name and date of birth  Pritesh Pérez was informed that this is a telemedicine visit and that the visit is being conducted through [facility] and patient was informed that this is not a secure, HIPAA-compliant platform  He agrees to proceed     My office door was closed  No one else was in the room  He acknowledged consent and understanding of privacy and security of the video platform  The patient has agreed to participate and understands they can discontinue the visit at any time  Patient is aware this is a billable service  Subjective  Pritesh Pérez is a 54 y o  male          Pt states he thinks he has covid  States he thinks he has it since last Saturday  His daughter tested positive - she did so on last wed  Pt states she lives in the house but she is up starirs  Pt states he is recovering from a partial nephrectomy  Pt states he is achy and he has an upset stomach  Pt states he does get fever spikes as high 104       History reviewed  No pertinent past medical history  Past Surgical History:   Procedure Laterality Date    CHOLECYSTECTOMY      HERNIA REPAIR      bilateral linguinal mesh    NASAL SEPTUM SURGERY      NEUROPLASTY / TRANSPOSITION ULNAR NERVE AT ELBOW Left     VT COLONOSCOPY FLX DX W/COLLJ SPEC WHEN PFRMD N/A 3/11/2016    Procedure: COLONOSCOPY;  Surgeon: María Rm MD;  Location: [facility]; Service: Gastroenterology       Current Outpatient Medications   Medication Sig Dispense Refill    halobetasol (ULTRAVATE) 0 05 % cream Apply topically 2 (two) times a day , short term 50 g 0    losartan (COZAAR) 25 mg tablet Take 25 mg by mouth every morning      Pseudoephedrine-APAP-DM (DAYQUIL PO) Take by mouth       No current facility-administered medications for this visit  No Known Allergies    Review of Systems   Constitutional: Positive for fatigue and fever  Negative for activity change, appetite change, chills, diaphoresis and unexpected weight change  HENT: Negative for congestion, dental problem, ear pain, mouth sores, sinus pressure, sinus pain, sore throat and trouble swallowing  Eyes: Negative for photophobia, discharge and itching  Respiratory: Negative for apnea, chest tightness and shortness of breath  Cardiovascular: Negative for chest pain, palpitations and leg swelling  Gastrointestinal: Negative for abdominal distention, abdominal pain, blood in stool, nausea and vomiting  Endocrine: Negative for cold intolerance, heat intolerance, polydipsia, polyphagia and polyuria  Genitourinary: Negative for difficulty urinating  Musculoskeletal: Positive for arthralgias and myalgias  Skin: Negative for color change and wound  Neurological: Positive for headaches  Negative for dizziness, syncope and speech difficulty     Hematological: Negative for adenopathy  Psychiatric/Behavioral: Negative for agitation and behavioral problems  Video Exam    There were no vitals filed for this visit  Physical Exam  Vitals signs reviewed  Constitutional:       General: He is not in acute distress  Appearance: He is well-developed  He is not diaphoretic  HENT:      Head: Normocephalic and atraumatic  Eyes:      General:         Right eye: No discharge  Left eye: No discharge  Conjunctiva/sclera: Conjunctivae normal    Neck:      Musculoskeletal: Normal range of motion  Pulmonary:      Effort: Pulmonary effort is normal  No respiratory distress  I spent 10 minutes directly with the patient during this visit      [address] acknowledges that he has consented to an online visit or consultation  He understands that the online visit is based solely on information provided by him, and that, in the absence of a face-to-face physical evaluation by the physician, the diagnosis he receives is both limited and provisional in terms of accuracy and completeness  This is not intended to replace a full medical face-to-face evaluation by the physician  Sheela Mixon understands and accepts these terms

## 2021-04-04 LAB — SARS-COV-2 N GENE RESP QL NAA+PROBE: POSITIVE

## 2021-04-05 ENCOUNTER — TELEPHONE (OUTPATIENT)
Dept: FAMILY MEDICINE CLINIC | Facility: CLINIC | Age: 56
End: 2021-04-05

## 2021-04-05 ENCOUNTER — TELEMEDICINE (OUTPATIENT)
Dept: FAMILY MEDICINE CLINIC | Facility: CLINIC | Age: 56
End: 2021-04-05
Payer: COMMERCIAL

## 2021-04-05 DIAGNOSIS — U07.1 COVID-19 VIRUS INFECTION: Primary | ICD-10-CM

## 2021-04-05 PROCEDURE — 99213 OFFICE O/P EST LOW 20 MIN: CPT | Performed by: FAMILY MEDICINE

## 2021-04-05 NOTE — PROGRESS NOTES
Virtual Regular Visit      Assessment/Plan:    Problem List Items Addressed This Visit     None      Visit Diagnoses     COVID-19 virus infection    -  Primary        Pt does not want to do is [place]  Pt does not seem to be ready to go back to work - pt is still febrile on and off  PT advised on proper quarrentine as well as sup[plements  Pt will remain on quarentine for 7 more days we can speak again in 7  If he clears we can see him sooner          Reason for visit is   Chief Complaint   Patient presents with    Virtual Regular Visit        Encounter provider Liat Velasquez DO    Provider located at [address] [address]      Recent Visits  Date Type Provider Dept   04/03/21 [address], [address] recent visits within past 7 days and meeting all other requirements     Today's Visits  Date Type Provider Dept   04/05/21 [address], [address]   04/05/21 Telephone Liat Velasquez, [address] today's visits and meeting all other requirements     Future Appointments  No visits were found meeting these conditions  Showing future appointments within next 150 days and meeting all other requirements        The patient was identified by name and date of birth  Argenis Green was informed that this is a telemedicine visit and that the visit is being conducted through [address] [place] [address] and patient was informed that this is not a secure, HIPAA-compliant platform  He agrees to proceed     My office door was closed  No one else was in the room  He acknowledged consent and understanding of privacy and security of the video platform  The patient has agreed to participate and understands they can discontinue the visit at any time  Patient is aware this is a billable service  Subjective  Argenis Green is a 54 y o  male          Pt is positive for covid  Pt states his first day of symptoms was the 27th  Pt states he has fever , uset stomach achiness and he is tired  Pt does not feel like he is getting or worse  Has no sense of taste and smell         No past medical history on file  Past Surgical History:   Procedure Laterality Date    CHOLECYSTECTOMY      HERNIA REPAIR      bilateral linguinal mesh    NASAL SEPTUM SURGERY      NEUROPLASTY / TRANSPOSITION ULNAR NERVE AT ELBOW Left     DE COLONOSCOPY FLX DX W/COLLJ SPEC WHEN PFRMD N/A 3/11/2016    Procedure: COLONOSCOPY;  Surgeon: William Romero MD;  Location: [facility]; Service: Gastroenterology       Current Outpatient Medications   Medication Sig Dispense Refill    halobetasol (ULTRAVATE) 0 05 % cream Apply topically 2 (two) times a day , short term 50 g 0    losartan (COZAAR) 25 mg tablet Take 25 mg by mouth every morning      Pseudoephedrine-APAP-DM (DAYQUIL PO) Take by mouth       No current facility-administered medications for this visit  No Known Allergies    Review of Systems   Constitutional: Positive for fatigue and fever  Negative for activity change, appetite change, chills, diaphoresis and unexpected weight change  HENT: Negative for congestion, dental problem, ear pain, mouth sores, sinus pressure, sinus pain, sore throat and trouble swallowing  Eyes: Negative for photophobia, discharge and itching  Respiratory: Negative for apnea, chest tightness and shortness of breath  Cardiovascular: Negative for chest pain, palpitations and leg swelling  Gastrointestinal: Negative for abdominal distention, abdominal pain, blood in stool, nausea and vomiting  Endocrine: Negative for cold intolerance, heat intolerance, polydipsia, polyphagia and polyuria  Genitourinary: Negative for difficulty urinating  Musculoskeletal: Negative for arthralgias  Skin: Negative for color change and wound  Neurological: Negative for dizziness, syncope, speech difficulty and headaches  Hematological: Negative for adenopathy  Psychiatric/Behavioral: Negative for agitation and behavioral problems  Video Exam    There were no vitals filed for this visit  Physical Exam  Vitals signs reviewed  Constitutional:       General: He is not in acute distress  Appearance: He is well-developed  He is not diaphoretic  HENT:      Head: Normocephalic and atraumatic  Eyes:      General:         Right eye: No discharge  Left eye: No discharge  Conjunctiva/sclera: Conjunctivae normal    Neck:      Musculoskeletal: Normal range of motion  Pulmonary:      Effort: Pulmonary effort is normal  No respiratory distress  I spent 10 minutes directly with the patient during this visit      [address] acknowledges that he has consented to an online visit or consultation  He understands that the online visit is based solely on information provided by him, and that, in the absence of a face-to-face physical evaluation by the physician, the diagnosis he receives is both limited and provisional in terms of accuracy and completeness  This is not intended to replace a full medical face-to-face evaluation by the physician  Bonny Garza understands and accepts these terms

## 2021-04-05 NOTE — TELEPHONE ENCOUNTER
Please call pt his COVID test is positive - Will need a virtueal appt today to discuss, can be with PCP if slot is available or with me

## 2021-04-05 NOTE — TELEPHONE ENCOUNTER
[facility] requesting a call back please schedule patient when he calls back    Loreto Ponce MA

## 2021-04-14 ENCOUNTER — OFFICE VISIT (OUTPATIENT)
Dept: FAMILY MEDICINE CLINIC | Facility: CLINIC | Age: 56
End: 2021-04-14
Payer: COMMERCIAL

## 2021-04-14 VITALS
TEMPERATURE: 98 F | BODY MASS INDEX: 28.2 KG/M2 | HEIGHT: 72 IN | DIASTOLIC BLOOD PRESSURE: 70 MMHG | HEART RATE: 76 BPM | RESPIRATION RATE: 12 BRPM | SYSTOLIC BLOOD PRESSURE: 130 MMHG | WEIGHT: 208.2 LBS | OXYGEN SATURATION: 96 %

## 2021-04-14 DIAGNOSIS — C64.2: ICD-10-CM

## 2021-04-14 DIAGNOSIS — R21 RASH: Primary | ICD-10-CM

## 2021-04-14 DIAGNOSIS — C67.9 BLADDER CARCINOMA (HCC): ICD-10-CM

## 2021-04-14 DIAGNOSIS — I10 ESSENTIAL HYPERTENSION: ICD-10-CM

## 2021-04-14 PROCEDURE — 99214 OFFICE O/P EST MOD 30 MIN: CPT | Performed by: FAMILY MEDICINE

## 2021-04-14 PROCEDURE — 1036F TOBACCO NON-USER: CPT | Performed by: FAMILY MEDICINE

## 2021-04-14 PROCEDURE — 3008F BODY MASS INDEX DOCD: CPT | Performed by: FAMILY MEDICINE

## 2021-04-14 PROCEDURE — 3078F DIAST BP <80 MM HG: CPT | Performed by: FAMILY MEDICINE

## 2021-04-14 PROCEDURE — 3075F SYST BP GE 130 - 139MM HG: CPT | Performed by: FAMILY MEDICINE

## 2021-04-14 RX ORDER — METHYLPREDNISOLONE 4 MG/1
TABLET ORAL
Qty: 21 TABLET | Refills: 0 | Status: SHIPPED | OUTPATIENT
Start: 2021-04-14 | End: 2021-04-20

## 2021-04-14 NOTE — PROGRESS NOTES
Assessment/Plan:    No problem-specific Assessment & Plan notes found for this encounter  Rash, nonspecific  Doubt covid related  Not itchy like scabies  Consider eczema as has hx  Steroids po course, f/u if no better or dermatologist    htn stable    Continued tob abstinence advised    Bladder ca, has ongoing cysto surveillance, he requested a  urologist for possible 2nd opinion and followup     Diagnoses and all orders for this visit:    Rash  -     methylPREDNISolone 4 MG tablet therapy pack; Use as directed on package    Papillary transitional cell carcinoma, renal, left (HCC)    Essential hypertension    Bladder carcinoma ([address] )              Return if symptoms worsen or fail to improve  Subjective:      Patient ID: Bernardino Darling is a 54 y o  male  Chief Complaint   Patient presents with    Rash     Patient has had rash since ruthy COVID  No pain, itching or burning per patient  Does also have history of eczema  HPI  covid 2w ago  Rash around same time   Not itchy  Upper back and neck and chest  Spreading to torso and now legs  No travel or new foods or hot tub  No new cosmetics or foods  No one else in home has same rash      The following portions of the patient's history were reviewed and updated as appropriate: allergies, current medications, past family history, past medical history, past social history, past surgical history and problem list     Review of Systems   Constitutional: Negative for fever  Respiratory: Negative for shortness of breath            Current Outpatient Medications   Medication Sig Dispense Refill    halobetasol (ULTRAVATE) 0 05 % cream Apply topically 2 (two) times a day , short term 50 g 0    losartan (COZAAR) 25 mg tablet Take 25 mg by mouth every morning      methylPREDNISolone 4 MG tablet therapy pack Use as directed on package 21 tablet 0    Pseudoephedrine-APAP-DM (DAYQUIL PO) Take by mouth       No current facility-administered medications for this visit  Objective:    /70   Pulse 76   Temp 98 °F (36 7 °C) (Temporal)   Resp 12   Ht 5' 11 5" (1 816 m)   Wt 94 4 kg (208 lb 3 2 oz)   SpO2 96%   BMI 28 63 kg/m²        Physical Exam  Vitals signs and nursing note reviewed  Constitutional:       General: He is not in acute distress  Appearance: He is well-developed  He is not ill-appearing  HENT:      Head: Normocephalic  Right Ear: Tympanic membrane normal       Left Ear: Tympanic membrane normal    Eyes:      Conjunctiva/sclera: Conjunctivae normal    Neck:      Musculoskeletal: Neck supple  Cardiovascular:      Rate and Rhythm: Normal rate and regular rhythm  Heart sounds: No murmur  Pulmonary:      Effort: Pulmonary effort is normal  No respiratory distress  Abdominal:      General: There is no distension  Palpations: Abdomen is soft  Tenderness: There is no abdominal tenderness  Musculoskeletal:         General: No deformity  Skin:     General: Skin is warm and dry  Coloration: Skin is not jaundiced or pale  Findings: Rash present  Comments: Macular rash neck, axillae, arms, thighs w/o vesicle or pustule   Neurological:      Mental Status: He is alert  Psychiatric:         Behavior: Behavior normal          Thought Content: Thought content normal        BMI Counseling: Body mass index is 28 63 kg/m²  The BMI is above normal  Nutrition recommendations include decreasing portion sizes and moderation in carbohydrate intake  Exercise recommendations include exercising 3-5 times per week  No pharmacotherapy was ordered                  Yetta Comings, DO

## 2021-08-01 NOTE — PATIENT INSTRUCTIONS

## 2021-08-01 NOTE — PROGRESS NOTES
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, 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 and paints in this capacity which also represents potential carcinogen exposure  ECOG performance status is currently 0, he feels quite good, voiding well at this time  With regard to prostate cancer screening his PSA is well within normal range at around 1 7, prostate is smooth and enlarged without nodules or consistency changes, estimated prostate volume of around 45 or 50 grams per rectal exam findings  He does have some numbness around his incisions from partial nephrectomy, and some small amount of eventration around his extraction site without a true hernia  I did tell him that I have had similar cases in my own patients having undergone partial nephrectomy in robotic fashion  In reviewing his last outside notes he has a recurrence of bladder cancer, reportedly a history of high-grade T1 urothelial carcinoma with recurrence of a low-grade tumor subsequent to that and having undergone some office fulgurations previously  He is scheduled for outpatient TURBT with his outside urologist, I have recommended that he proceed to this surgery for treatment of recurrent bladder cancer    Given this history I would classify this as a high risk bladder cancer, options for treatment going forward will include repeat induction BCG versus other intravesical therapies, versus consideration of radical cystectomy in the future should the patient wish for this  (and should pathology from repeat surgery recommend that this would be indicated)  With regard to his transition of care to our service I would recommend a surveillance cystoscopy in 3 months, along with repeat imaging in 3 months given his history of papillary renal cell carcinoma with concern for entry into the tumor during removal per his report  No other questions or concerns for me today, outside imaging reviewed, I have carbon copied his urologist on today's note as well    Assessment and plan:       Please see problem oriented charting for the assessment plan of today's urological complaints      Pedro Richard MD      Chief Complaint     No chief complaint on file  History of Present Illness     Rocio Eubanks is a 54 y o  Patient  That hasa history of high-grade urothelial carcinoma, he underwent BCG therapy, he has had some recurrences and undergone fulguration of these in the office, and subsequent TURBT  TURBT in June 2020 showed low-grade T1 urothelial carcinoma, a tiny low-grade tumor was reportedly fulgurated in January of this year  In March of this year he had a robotic left partial nephrectomy with concern for entering into tumor capsule while mobilizing the tumor, postoperative imaging performed last month showed some postoperative changes without overt recurrence  ECOG performance status is currently 0, no systemic complaints today, he did have some questions about BPH/LUTS while checking out and was given our information packet regarding this disease process  At future visit should he have worsening troubles urinating (he did not mention these during today's visit) we can perform an AUA symptom score and uroflow and PVR along with cystoscopy and transrectal ultrasound in preparation for potential outlet surgery if indicated       The following portions of the patient's history were reviewed and updated as appropriate: allergies, current medications, past family history, past medical history, past social history, past surgical history and problem list         Detailed Urologic History     - please refer to HPI    Review of Systems     Review of Systems   Constitutional: Negative  HENT: Negative  Eyes: Negative  Respiratory: Negative  Cardiovascular: Negative  Gastrointestinal: Negative  Endocrine: Negative  Genitourinary: Negative  Musculoskeletal: Negative  Skin: Negative  Allergic/Immunologic: Negative  Neurological: Negative  Hematological: Negative  Psychiatric/Behavioral: Negative  Allergies     No Known Allergies    Physical Exam     Physical Exam  Vitals reviewed  Constitutional:       General: He is not in acute distress  Appearance: Normal appearance  He is not ill-appearing, toxic-appearing or diaphoretic  HENT:      Head: Normocephalic and atraumatic  Eyes:      General: No scleral icterus  Right eye: No discharge  Left eye: No discharge  Cardiovascular:      Pulses: Normal pulses  Pulmonary:      Effort: Pulmonary effort is normal    Abdominal:      General: There is no distension  Palpations: There is no mass  Tenderness: There is no abdominal tenderness  Hernia: No hernia is present  Genitourinary:     Comments: Normal phallus and meatus, normal testes, no perineal changes, prostate is roughly 45-50 grams and smooth without nodules or consistency changes  Musculoskeletal:         General: No swelling  Skin:     General: Skin is warm  Neurological:      General: No focal deficit present  Mental Status: He is alert and oriented to person, place, and time  Cranial Nerves: No cranial nerve deficit  Psychiatric:         Mood and Affect: Mood normal          Behavior: Behavior normal          Thought Content:  Thought content normal          Judgment: Judgment normal              Vital Signs  Vitals:    08/04/21 1022   BP: 132/74   BP Location: Left arm Patient Position: Sitting   Cuff Size: Adult   Pulse: 75   Weight: 94 3 kg (208 lb)   Height: 5' 11 5" (1 816 m)         Current Medications       Current Outpatient Medications:     aspirin (ECOTRIN LOW STRENGTH) 81 mg EC tablet, Take 81 mg by mouth daily, Disp: , Rfl:     halobetasol (ULTRAVATE) 0 05 % cream, Apply topically 2 (two) times a day , short term, Disp: 50 g, Rfl: 0    losartan (COZAAR) 25 mg tablet, Take 25 mg by mouth every morning, Disp: , Rfl:     Pseudoephedrine-APAP-DM (DAYQUIL PO), Take by mouth, Disp: , Rfl:     rosuvastatin (CRESTOR) 20 MG tablet, Take 20 mg by mouth every morning, Disp: , Rfl:       Active Problems     Patient Active Problem List   Diagnosis    Actinic keratoses    Bladder carcinoma ([facility] [address] )    Fatty liver    Abdominal hernia    High triglycerides    Hyperlipidemia LDL goal <160    Seborrheic keratosis    Screening for cardiovascular, respiratory, and genitourinary diseases    Screening for diabetes mellitus (DM)    Prostate cancer screening    Prediabetes    Healthcare maintenance    [place] cardiac risk 10-20% in next 10 years    Obesity (BMI 30-39  9)    Thyroid nodule    Eczema    Venous anomaly    Essential hypertension    Family history of coronary arteriosclerosis    Rash    Papillary renal cell carcinoma (HCC)         Past Medical History     History reviewed  No pertinent past medical history  Surgical History     Past Surgical History:   Procedure Laterality Date    CHOLECYSTECTOMY      HERNIA REPAIR      bilateral linguinal mesh    NASAL SEPTUM SURGERY      NEUROPLASTY / TRANSPOSITION ULNAR NERVE AT ELBOW Left     IN COLONOSCOPY FLX DX W/COLLJ SPEC WHEN PFRMD N/A 3/11/2016    Procedure: COLONOSCOPY;  Surgeon: Angeli Le MD;  Location: [facility]; Service: Gastroenterology         Family History     History reviewed  No pertinent family history        Social History     Social History     Social History     Tobacco Use Smoking Status Former Smoker    Packs/day: 2 00    Years: 40 00    Pack years: 80 00    Quit date: 2016    Years since quittin 2   Smokeless Tobacco Never Used         Pertinent Lab Values     Lab Results   Component Value Date    CREATININE 1 16 2021       Lab Results   Component Value Date    PSA 1 7 2021    PSA 2 6 2020    PSA 2 2 2019             Pertinent Imaging       Outside imaging reports reviewed

## 2021-08-04 ENCOUNTER — OFFICE VISIT (OUTPATIENT)
Dept: UROLOGY | Facility: CLINIC | Age: 56
End: 2021-08-04
Payer: COMMERCIAL

## 2021-08-04 ENCOUNTER — TELEPHONE (OUTPATIENT)
Dept: UROLOGY | Facility: MEDICAL CENTER | Age: 56
End: 2021-08-04

## 2021-08-04 VITALS
WEIGHT: 208 LBS | HEART RATE: 75 BPM | BODY MASS INDEX: 28.17 KG/M2 | SYSTOLIC BLOOD PRESSURE: 132 MMHG | DIASTOLIC BLOOD PRESSURE: 74 MMHG | HEIGHT: 72 IN

## 2021-08-04 DIAGNOSIS — Z12.5 PROSTATE CANCER SCREENING: ICD-10-CM

## 2021-08-04 DIAGNOSIS — C64.9 PAPILLARY RENAL CELL CARCINOMA (HCC): Primary | ICD-10-CM

## 2021-08-04 DIAGNOSIS — C67.9 BLADDER CARCINOMA (HCC): ICD-10-CM

## 2021-08-04 PROBLEM — C64.2: Status: RESOLVED | Noted: 2017-02-22 | Resolved: 2021-08-04

## 2021-08-04 PROCEDURE — 99204 OFFICE O/P NEW MOD 45 MIN: CPT | Performed by: UROLOGY

## 2021-08-04 PROCEDURE — 1036F TOBACCO NON-USER: CPT | Performed by: UROLOGY

## 2021-08-04 RX ORDER — ROSUVASTATIN CALCIUM 20 MG/1
20 TABLET, COATED ORAL EVERY MORNING
COMMUNITY
Start: 2021-07-21

## 2021-08-04 RX ORDER — ASPIRIN 81 MG/1
81 TABLET ORAL DAILY
COMMUNITY
End: 2021-08-10

## 2021-08-04 NOTE — TELEPHONE ENCOUNTER
Patient has questions about testing ordered and timing from today's appt  Reviewed providers recommendation  Patient verbalized understanding

## 2021-08-04 NOTE — LETTER
August 4, 2021     Pedro Austin DO  [address] [address] [facility] [address]    Patient: Analia Bell   YOB: 1965   Date of Visit: 8/4/2021       Dear Dr Shane Haskins: Thank you for referring Emily Reyes to me for evaluation  Below are my notes for this consultation  If you have questions, please do not hesitate to call me  I look forward to following your patient along with you  Sincerely,        Juliann Siddiqui MD        CC: Referral Self  Bo Bloodgood, MD Juliann Siddiqui MD  8/4/2021 10:59 AM  Sign when Signing Visit       Problem List Items Addressed This Visit        Genitourinary    Bladder carcinoma ([facility] [address] )    Papillary renal cell carcinoma ([facility] [address] ) - Primary    Relevant Orders    CBC and differential    Comprehensive metabolic panel    CT chest abdomen pelvis w contrast       Other    Prostate cancer screening          Discussion:     Alisonlisa Davis and I had a productive visit  He is a previous smoker with roughly 80 pack year smoking history, he quit smoking many years ago cold turkey  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, 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 and paints in this capacity which also represents potential carcinogen exposure  ECOG performance status is currently 0, he feels quite good, voiding well at this time  With regard to prostate cancer screening his PSA is well within normal range at around 1 7, prostate is smooth and enlarged without nodules or consistency changes, estimated prostate volume of around 45 or 50 grams per rectal exam findings  He does have some numbness around his incisions from partial nephrectomy, and some small amount of eventration around his extraction site without a true hernia  I did tell him that I have had similar cases in my own patients having undergone partial nephrectomy in robotic fashion        In reviewing his last outside notes he has a recurrence of bladder cancer, reportedly a history of high-grade T1 urothelial carcinoma with recurrence of a low-grade tumor subsequent to that and having undergone some office fulgurations previously  He is scheduled for outpatient TURBT with his outside urologist, I have recommended that he proceed to this surgery for treatment of recurrent bladder cancer  Given this history I would classify this as a high risk bladder cancer, options for treatment going forward will include repeat induction BCG versus other intravesical therapies, versus consideration of radical cystectomy in the future should the patient wish for this  (and should pathology from repeat surgery recommend that this would be indicated)  With regard to his transition of care to our service I would recommend a surveillance cystoscopy in 3 months, along with repeat imaging in 3 months given his history of papillary renal cell carcinoma with concern for entry into the tumor during removal per his report  No other questions or concerns for me today, outside imaging reviewed, I have carbon copied his urologist on today's note as well    Assessment and plan:       Please see problem oriented charting for the assessment plan of today's urological complaints      Sharita Eller MD      Chief Complaint     No chief complaint on file  History of Present Illness     Sandra Blanchard is a 54 y o  Patient  That hasa history of high-grade urothelial carcinoma, he underwent BCG therapy, he has had some recurrences and undergone fulguration of these in the office, and subsequent TURBT  TURBT in June 2020 showed low-grade T1 urothelial carcinoma, a tiny low-grade tumor was reportedly fulgurated in January of this year  In March of this year he had a robotic left partial nephrectomy with concern for entering into tumor capsule while mobilizing the tumor, postoperative imaging performed last month showed some postoperative changes without overt recurrence      ECOG performance status is currently 0, no systemic complaints today, he did have some questions about BPH/LUTS while checking out and was given our information packet regarding this disease process  At future visit should he have worsening troubles urinating (he did not mention these during today's visit) we can perform an AUA symptom score and uroflow and PVR along with cystoscopy and transrectal ultrasound in preparation for potential outlet surgery if indicated  The following portions of the patient's history were reviewed and updated as appropriate: allergies, current medications, past family history, past medical history, past social history, past surgical history and problem list         Detailed Urologic History     - please refer to HPI    Review of Systems     Review of Systems   Constitutional: Negative  HENT: Negative  Eyes: Negative  Respiratory: Negative  Cardiovascular: Negative  Gastrointestinal: Negative  Endocrine: Negative  Genitourinary: Negative  Musculoskeletal: Negative  Skin: Negative  Allergic/Immunologic: Negative  Neurological: Negative  Hematological: Negative  Psychiatric/Behavioral: Negative  Allergies     No Known Allergies    Physical Exam     Physical Exam  Vitals reviewed  Constitutional:       General: He is not in acute distress  Appearance: Normal appearance  He is not ill-appearing, toxic-appearing or diaphoretic  HENT:      Head: Normocephalic and atraumatic  Eyes:      General: No scleral icterus  Right eye: No discharge  Left eye: No discharge  Cardiovascular:      Pulses: Normal pulses  Pulmonary:      Effort: Pulmonary effort is normal    Abdominal:      General: There is no distension  Palpations: There is no mass  Tenderness: There is no abdominal tenderness  Hernia: No hernia is present     Genitourinary:     Comments: Normal phallus and meatus, normal testes, no perineal changes, prostate is roughly 45-50 grams and smooth without nodules or consistency changes  Musculoskeletal:         General: No swelling  Skin:     General: Skin is warm  Neurological:      General: No focal deficit present  Mental Status: He is alert and oriented to person, place, and time  Cranial Nerves: No cranial nerve deficit  Psychiatric:         Mood and Affect: Mood normal          Behavior: Behavior normal          Thought Content: Thought content normal          Judgment: Judgment normal              Vital Signs  Vitals:    08/04/21 1022   BP: 132/74   BP Location: Left arm   Patient Position: Sitting   Cuff Size: Adult   Pulse: 75   Weight: 94 3 kg (208 lb)   Height: 5' 11 5" (1 816 m)         Current Medications       Current Outpatient Medications:     aspirin (ECOTRIN LOW STRENGTH) 81 mg EC tablet, Take 81 mg by mouth daily, Disp: , Rfl:     halobetasol (ULTRAVATE) 0 05 % cream, Apply topically 2 (two) times a day , short term, Disp: 50 g, Rfl: 0    losartan (COZAAR) 25 mg tablet, Take 25 mg by mouth every morning, Disp: , Rfl:     Pseudoephedrine-APAP-DM (DAYQUIL PO), Take by mouth, Disp: , Rfl:     rosuvastatin (CRESTOR) 20 MG tablet, Take 20 mg by mouth every morning, Disp: , Rfl:       Active Problems     Patient Active Problem List   Diagnosis    Actinic keratoses    Bladder carcinoma ([facility] [address] )    Fatty liver    Abdominal hernia    High triglycerides    Hyperlipidemia LDL goal <160    Seborrheic keratosis    Screening for cardiovascular, respiratory, and genitourinary diseases    Screening for diabetes mellitus (DM)    Prostate cancer screening    Prediabetes    Healthcare maintenance    [place] cardiac risk 10-20% in next 10 years    Obesity (BMI 30-39  9)    Thyroid nodule    Eczema    Venous anomaly    Essential hypertension    Family history of coronary arteriosclerosis    Rash    Papillary renal cell carcinoma (HCC)         Past Medical History     History reviewed   No pertinent past medical history  Surgical History     Past Surgical History:   Procedure Laterality Date    CHOLECYSTECTOMY      HERNIA REPAIR      bilateral linguinal mesh    NASAL SEPTUM SURGERY      NEUROPLASTY / TRANSPOSITION ULNAR NERVE AT ELBOW Left     IN COLONOSCOPY FLX DX W/COLLJ SPEC WHEN PFRMD N/A 3/11/2016    Procedure: COLONOSCOPY;  Surgeon: Phil Gan MD;  Location: [facility] [place] [facility]; Service: Gastroenterology         Family History     History reviewed  No pertinent family history        Social History     Social History     Social History     Tobacco Use   Smoking Status Former Smoker    Packs/day: 2 00    Years: 40 00    Pack years: 80 00    Quit date: 2016    Years since quittin 2   Smokeless Tobacco Never Used         Pertinent Lab Values     Lab Results   Component Value Date    CREATININE 1 16 2021       Lab Results   Component Value Date    PSA 1 7 2021    PSA 2 6 2020    PSA 2 2 2019             Pertinent Imaging       Outside imaging reports reviewed

## 2021-08-10 ENCOUNTER — OFFICE VISIT (OUTPATIENT)
Dept: FAMILY MEDICINE CLINIC | Facility: CLINIC | Age: 56
End: 2021-08-10
Payer: COMMERCIAL

## 2021-08-10 VITALS
BODY MASS INDEX: 32.91 KG/M2 | WEIGHT: 243 LBS | HEIGHT: 72 IN | TEMPERATURE: 96.2 F | HEART RATE: 64 BPM | RESPIRATION RATE: 16 BRPM | DIASTOLIC BLOOD PRESSURE: 84 MMHG | SYSTOLIC BLOOD PRESSURE: 142 MMHG

## 2021-08-10 DIAGNOSIS — C67.9 BLADDER CARCINOMA (HCC): Primary | ICD-10-CM

## 2021-08-10 DIAGNOSIS — E04.1 THYROID NODULE: ICD-10-CM

## 2021-08-10 DIAGNOSIS — I10 ESSENTIAL HYPERTENSION: ICD-10-CM

## 2021-08-10 DIAGNOSIS — C64.9 PAPILLARY RENAL CELL CARCINOMA (HCC): ICD-10-CM

## 2021-08-10 DIAGNOSIS — E78.5 HYPERLIPIDEMIA LDL GOAL <160: ICD-10-CM

## 2021-08-10 PROCEDURE — 3008F BODY MASS INDEX DOCD: CPT | Performed by: UROLOGY

## 2021-08-10 PROCEDURE — 99214 OFFICE O/P EST MOD 30 MIN: CPT | Performed by: NURSE PRACTITIONER

## 2021-08-10 RX ORDER — ASPIRIN 81 MG/1
81 TABLET ORAL DAILY
COMMUNITY
End: 2021-11-08

## 2021-08-10 NOTE — PROGRESS NOTES
FAMILY PRACTICE PRE-OPERATIVE EVALUATION  [facility]    NAME: Sherice Colmenares  AGE: 54 y o  SEX: male  : 1965     DATE: 8/10/2021    Family Practice Pre-Operative Evaluation      Chief Complaint: Pre-operative Evaluation     Surgery: TURBT  Anticipated Date of Surgery: 21  Surgeon: Dr Clint Epps      History of Present Illness:     Here today for preop exam prior to upcoming surgery as above  He was diagnosed with bladder cancer in [id number]  He has met with Dr Orion Batres through [facility] and will be transitioning is care postoperatively  Scheduled for a CT in October  He has been feeling well  History of HTN and hyperlipidemia  He reports he has already received cardiac clearance through his cardiologist in [facility]  Had COVID back in March, feels he is recovered  He is not vaccinated  He is scheduled for PAT testing prior to surgery  Anesthesia:  IV sedation  Bleeding Risk: no recent abnormal bleeding, no remote history of abnormal bleeding and no use of Ca-channel blockers  Current Anti-platelet/anticoagulation medication: Aspirin    Assessment of Cardiac Risk:  · Denies unstable or severe angina or MI in the last 6 weeks or history of stent placement in the last year   · Denies decompensated heart failure (e g  New onset heart failure, NYHA functional class IV heart failure, or worsening existing heart failure)  · Denies significant arrhythmias such as high grade AV block, symptomatic ventricular arrhythmia, newly recognized ventricular tachycardia, supraventricular tachycardia with resting heart rate >100, or symptomatic bradycardia  · Denies severe heart valve disease including aortic stenosis or symptomatic mitral stenosis     Exercise Capacity:  · Able to walk 4 blocks without symptoms?: Yes  · Able to walk 2 flights without symptoms?: Yes    Prior Anesthesia Reactions: No     Personal history of venous thromboembolic disease?  No    History of steroid use for >2 weeks within last year? No         Review of Systems:     Review of Systems   Constitutional: Negative for chills, fatigue and fever  Respiratory: Negative for cough, shortness of breath and wheezing  Cardiovascular: Negative for chest pain, palpitations and leg swelling  Gastrointestinal: Negative for abdominal pain, diarrhea, nausea and vomiting  Skin: Negative for rash  Neurological: Negative for dizziness and headaches  Current Problem List:     Patient Active Problem List   Diagnosis    Actinic keratoses    Bladder carcinoma ([address] )    Fatty liver    Abdominal hernia    High triglycerides    Hyperlipidemia LDL goal <160    Seborrheic keratosis    Screening for cardiovascular, respiratory, and genitourinary diseases    Screening for diabetes mellitus (DM)    Prostate cancer screening    Prediabetes    Healthcare maintenance    [place] cardiac risk 10-20% in next 10 years    Obesity (BMI 30-39  9)    Thyroid nodule    Eczema    Venous anomaly    Essential hypertension    Family history of coronary arteriosclerosis    Rash    Papillary renal cell carcinoma (HCC)       Allergies:     No Known Allergies    Current Medications:       Current Outpatient Medications:     aspirin (ECOTRIN LOW STRENGTH) 81 mg EC tablet, Take 81 mg by mouth daily, Disp: , Rfl:     halobetasol (ULTRAVATE) 0 05 % cream, Apply topically 2 (two) times a day , short term, Disp: 50 g, Rfl: 0    losartan (COZAAR) 25 mg tablet, Take 25 mg by mouth every morning, Disp: , Rfl:     rosuvastatin (CRESTOR) 20 MG tablet, Take 20 mg by mouth every morning, Disp: , Rfl:     Past Medical History:       History reviewed  No pertinent past medical history       Past Surgical History:   Procedure Laterality Date    CHOLECYSTECTOMY      HERNIA REPAIR      bilateral linguinal mesh    NASAL SEPTUM SURGERY      NEUROPLASTY / TRANSPOSITION ULNAR NERVE AT ELBOW Left     SD COLONOSCOPY FLX DX W/COLLJ SPEC WHEN PFRMD N/A 3/11/2016    Procedure: COLONOSCOPY;  Surgeon: Misbah Turcios MD;  Location: [place] [address] GI LAB; Service: Gastroenterology        History reviewed  No pertinent family history  Social History     Socioeconomic History    Marital status: /Civil Union     Spouse name: Not on file    Number of children: Not on file    Years of education: Not on file    Highest education level: Not on file   Occupational History    Not on file   Tobacco Use    Smoking status: Former Smoker     Packs/day: 2 00     Years: 40 00     Pack years: 80 00     Quit date: 2016     Years since quittin 2    Smokeless tobacco: Never Used   Substance and Sexual Activity    Alcohol use: Yes     Comment: rare    Drug use: No    Sexual activity: Not on file   Other Topics Concern    Not on file   Social History Narrative    Not on file     Social Determinants of Health     Financial Resource Strain:     Difficulty of Paying Living Expenses:    Food Insecurity:     Worried About Running Out of Food in the Last Year:     [address] in the Last Year:    Transportation Needs:     Lack of Transportation (Medical):      Lack of Transportation (Non-Medical):    Physical Activity:     Days of Exercise per Week:     Minutes of Exercise per Session:    Stress:     Feeling of Stress :    Social Connections:     Frequency of Communication with Friends and Family:     Frequency of Social Gatherings with Friends and Family:     Attends Rastafarian Services:     Active Member of Clubs or Organizations:     Attends Club or Organization Meetings:     Marital Status:    Intimate Partner Violence:     Fear of Current or Ex-Partner:     Emotionally Abused:     Physically Abused:     Sexually Abused:         Physical Exam:     /84   Pulse 64   Temp (!) 96 2 °F (35 7 °C)   Resp 16   Ht 5' 11 5" (1 816 m)   Wt 110 kg (243 lb)   BMI 33 42 kg/m²     Physical Exam  Constitutional:       Appearance: Normal appearance  He is well-developed  HENT:      Head: Normocephalic and atraumatic  Right Ear: Tympanic membrane, ear canal and external ear normal       Left Ear: Tympanic membrane, ear canal and external ear normal       Nose: No mucosal edema or rhinorrhea  Mouth/Throat:      Pharynx: Uvula midline  Eyes:      Conjunctiva/sclera: Conjunctivae normal    Neck:      Thyroid: No thyromegaly  Vascular: No carotid bruit  Cardiovascular:      Rate and Rhythm: Normal rate and regular rhythm  Pulses: Normal pulses  Heart sounds: Normal heart sounds  No murmur heard  Pulmonary:      Effort: Pulmonary effort is normal       Breath sounds: Normal breath sounds  Abdominal:      General: Bowel sounds are normal  There is no distension  Palpations: There is no hepatomegaly or splenomegaly  Tenderness: There is no abdominal tenderness  Musculoskeletal:      Cervical back: Neck supple  No edema  Lymphadenopathy:      Cervical:      Right cervical: No superficial cervical adenopathy  Left cervical: No superficial cervical adenopathy  Skin:     General: Skin is warm and dry  Capillary Refill: Capillary refill takes less than 2 seconds  Findings: No rash  Neurological:      Mental Status: He is alert  Psychiatric:         Behavior: Behavior normal           Data:     Pre-operative work-up    Laboratory Results: I have personally reviewed the pertinent laboratory results/reports      EKG: I have personally reviewed pertinent reports  No results found for this or any previous visit (from the past 672 hour(s))          Assessment & Recommendations:     Problem List Items Addressed This Visit        Endocrine    Thyroid nodule     Due for repeat US in January 2022            Cardiovascular and Mediastinum    Essential hypertension     Stable, has already obtained cardiac clearance through [facility]            Genitourinary    Bladder carcinoma ([facility] [address] ) - Primary Recurrent  Scheduled for TURBT and will then be transitioning care to Dr Felipa Calle moving forward  Has appt for follow up arranged  Papillary renal cell carcinoma (HCC)       Other    Hyperlipidemia LDL goal <160     Started on a statin by his cardiologist   Shelly Cockayne well               Pre-Op Evaluation Assessment  54 y o  male with planned surgery: as above  Known risk factors for perioperative complications: None  Current medications which may produce withdrawal symptoms if withheld perioperatively: none  Pre-Op Evaluation Plan  1  Further preoperative workup as follows:   - cardiac clearance has been obtained per patient    2  Medication Management/Recommendations:   - Patient has been instructed to avoid herbs or non-directed vitamins the week prior to surgery to ensure no drug interactions with perioperative surgical and anesthetic medications  - Patient has been instructed to avoid aspirin containing medications or non-steroidal anti-inflammatory drugs for the week preceding surgery  3  Prophylaxis for cardiac events with perioperative beta-blockers: not indicated  4  Patient requires further consultation with: None  Cardiac clearance as reportedly been obtained, report not available for me to review  Clearance  Patient is CLEARED for surgery without any additional cardiac testing       Amy Moore, [address] [address]  Phone#  [phone]  Fax#  [phone]

## 2021-08-10 NOTE — ASSESSMENT & PLAN NOTE
Recurrent  Scheduled for TURBT and will then be transitioning care to Dr Ortega moving forward  Has appt for follow up arranged

## 2021-08-31 ENCOUNTER — TELEPHONE (OUTPATIENT)
Dept: UROLOGY | Facility: AMBULATORY SURGERY CENTER | Age: 56
End: 2021-08-31

## 2021-08-31 NOTE — TELEPHONE ENCOUNTER
From: Maggie Dubose MD  Please arrange for BCG therapy as per this note, thank you   ----- Message -----   From: Interface, Transcription Incoming   Sent: 8/24/2021   9:39 AM EDT   To: Maggie Dubose MD

## 2021-09-01 ENCOUNTER — TELEPHONE (OUTPATIENT)
Dept: OTHER | Facility: OTHER | Age: 56
End: 2021-09-01

## 2021-09-01 ENCOUNTER — NURSE TRIAGE (OUTPATIENT)
Dept: OTHER | Facility: OTHER | Age: 56
End: 2021-09-01

## 2021-09-01 DIAGNOSIS — R31.0 GROSS HEMATURIA: Primary | ICD-10-CM

## 2021-09-01 NOTE — TELEPHONE ENCOUNTER
Spoke with patient and advised he will need UA and urine culture  Patient goes to [facility]   Orders placed in chart and advised patient to go as soon as he can since he is leaving for vacation on Sat 9/4/21  He will have his cell phone on him while on vacation  Advised patient of hydrating well and monitoring for urinary retention and blood clots in urine  Patient verbalized understanding

## 2021-09-01 NOTE — TELEPHONE ENCOUNTER
[place] office DV patient    Agree with restrictions recommended thus far  Could be scabs from recent turbt 8/16/21 with dr Kelly Cesar in [place]  Recommend baseline ua/cx now as well to rule out infection post procedure especially since patient will be receiving bcg in the near future  Usual precautions for retention, clots etc  Hydrate well! Thank u!

## 2021-09-01 NOTE — TELEPHONE ENCOUNTER
Called and spoke to pt he stated back in [place] he had a procedure to have bladder cancer removed and he is not having blood clots and very light bleeding  Pt does not note doing anything strenuous that he can remember other then playing with his granddaughter or mowing the grass  He stated he feels fine just not sure if it is something he should be worried about   Pt informed to avoid bladder irritants

## 2021-09-01 NOTE — TELEPHONE ENCOUNTER
Regarding: Blood/Blood Clots in Urine  ----- Message from Radha Eubanks sent at 9/1/2021 10:35 AM EDT -----  " I have some blood and small blood clots in my urine "

## 2021-09-01 NOTE — TELEPHONE ENCOUNTER
Reason for Disposition   Blood in urine  (Exception: could be normal menstrual bleeding)    Answer Assessment - Initial Assessment Questions  1  COLOR of URINE: "Describe the color of the urine "  (e g , tea-colored, pink, red, blood clots, bloody)      Blood clots bright red, urine is pink  2  ONSET: "When did the bleeding start?"       saturday  3  EPISODES: "How many times has there been blood in the urine?" or "How many times today?"      Two times  4  PAIN with URINATION: "Is there any pain with passing your urine?" If Yes, ask: "How bad is the pain?"  (Scale 1-10; or mild, moderate, severe)     - MILD - complains slightly about urination hurting     - MODERATE - interferes with normal activities       - SEVERE - excruciating, unwilling or unable to urinate because of the pain       no  5  FEVER: "Do you have a fever?" If Yes, ask: "What is your temperature, how was it measured, and when did it start?"      no  6   ASSOCIATED SYMPTOMS: "Are you passing urine more frequently than usual?"     no    Protocols used: URINE - BLOOD IN-ADULT-

## 2021-09-03 LAB
BACTERIA UR CULT: NORMAL
Lab: NO GROWTH

## 2021-09-07 NOTE — TELEPHONE ENCOUNTER
Urine culture negative  Called and spoke with patient at this time  Advised of negative urine culture results  He states he only has very tiny rare 'scabs' in his urine over the weekend and he is feeling well  Reviewed we will be calling him hopefully soon to set up BCG  He verbalized understanding

## 2021-09-17 NOTE — TELEPHONE ENCOUNTER
Patient recently transferred care to Dr Lavelle Adorno, previously managed by outside urologist  History of HGT1, s/p BCG induction in 2017  Most recently had TURBT at outside facility, pathology showed atypical urothelial proliferation focal papillary  According to AUA risk stratification for non-muscle invasive bladder cancer, this patient has been classified as low risk, given recurrence greater than 3 years  However, after discussion with Dr Dionna Salgado, dosing is available, therefore recommending induction x 6 weeks  Please schedule patient for BCG x 6 weeks

## 2021-09-24 NOTE — TELEPHONE ENCOUNTER
Called and spoke at length with patient at this time  Advised I wanted to get him scheduled for BCG per Dr Jhonatan Guidry  Patient states he did not discuss this much with Dr Jhonatan Guidry at last visit and didn't want to schedule prior to seeing him again in November  Reviewed there is a national shortage and based on his recent pathology he does qualify for it  Reviewed it is an immunotherapy which helps prevent recurrences of bladder cancer  He still was not wanting to schedule at this time  Advised I would let Dr Jhonatan Guidry know  Patient also states he is having some bladder discomfort and sometimes dysuria  He reports he mainly drinks coffee all day and approx one bottle of water a week  I advised he is likely chronically dehydrated  As this would cause concentrated urine, this could be where the symptoms are coming from  Advised I can place orders for urine testing to rule out infection  Patient states he would like to try hydrating with water first and then call back if symptoms do not resolve  This is reasonable  I confirmed upcoming CT scan and cystoscopy appts  He had no further questions concerns

## 2021-09-30 LAB
ALBUMIN SERPL-MCNC: 4.6 G/DL (ref 3.8–4.9)
ALBUMIN/GLOB SERPL: 2.6 {RATIO} (ref 1.2–2.2)
ALP SERPL-CCNC: 70 IU/L (ref 44–121)
ALT SERPL-CCNC: 28 IU/L (ref 0–44)
AST SERPL-CCNC: 18 IU/L (ref 0–40)
BASOPHILS # BLD AUTO: 0.1 X10E3/UL (ref 0–0.2)
BASOPHILS NFR BLD AUTO: 1 %
BILIRUB SERPL-MCNC: 0.4 MG/DL (ref 0–1.2)
BUN SERPL-MCNC: 12 MG/DL (ref 6–24)
BUN/CREAT SERPL: 10 (ref 9–20)
CALCIUM SERPL-MCNC: 9.5 MG/DL (ref 8.7–10.2)
CHLORIDE SERPL-SCNC: 106 MMOL/L (ref 96–106)
CO2 SERPL-SCNC: 25 MMOL/L (ref 20–29)
CREAT SERPL-MCNC: 1.21 MG/DL (ref 0.76–1.27)
EOSINOPHIL # BLD AUTO: 0.2 X10E3/UL (ref 0–0.4)
EOSINOPHIL NFR BLD AUTO: 3 %
ERYTHROCYTE [DISTWIDTH] IN BLOOD BY AUTOMATED COUNT: 13.2 % (ref 11.6–15.4)
GLOBULIN SER-MCNC: 1.8 G/DL (ref 1.5–4.5)
GLUCOSE SERPL-MCNC: 105 MG/DL (ref 65–99)
HCT VFR BLD AUTO: 44.6 % (ref 37.5–51)
HGB BLD-MCNC: 15.1 G/DL (ref 13–17.7)
IMM GRANULOCYTES # BLD: 0 X10E3/UL (ref 0–0.1)
IMM GRANULOCYTES NFR BLD: 0 %
LYMPHOCYTES # BLD AUTO: 2.1 X10E3/UL (ref 0.7–3.1)
LYMPHOCYTES NFR BLD AUTO: 27 %
MCH RBC QN AUTO: 28.8 PG (ref 26.6–33)
MCHC RBC AUTO-ENTMCNC: 33.9 G/DL (ref 31.5–35.7)
MCV RBC AUTO: 85 FL (ref 79–97)
MONOCYTES # BLD AUTO: 0.7 X10E3/UL (ref 0.1–0.9)
MONOCYTES NFR BLD AUTO: 9 %
NEUTROPHILS # BLD AUTO: 4.4 X10E3/UL (ref 1.4–7)
NEUTROPHILS NFR BLD AUTO: 60 %
PLATELET # BLD AUTO: 242 X10E3/UL (ref 150–450)
POTASSIUM SERPL-SCNC: 4.9 MMOL/L (ref 3.5–5.2)
PROT SERPL-MCNC: 6.4 G/DL (ref 6–8.5)
RBC # BLD AUTO: 5.24 X10E6/UL (ref 4.14–5.8)
SL AMB EGFR AFRICAN AMERICAN: 77 ML/MIN/1.73
SL AMB EGFR NON AFRICAN AMERICAN: 67 ML/MIN/1.73
SODIUM SERPL-SCNC: 142 MMOL/L (ref 134–144)
WBC # BLD AUTO: 7.5 X10E3/UL (ref 3.4–10.8)

## 2021-10-05 ENCOUNTER — TELEPHONE (OUTPATIENT)
Dept: UROLOGY | Facility: AMBULATORY SURGERY CENTER | Age: 56
End: 2021-10-05

## 2021-10-05 DIAGNOSIS — C67.9 BLADDER CARCINOMA (HCC): ICD-10-CM

## 2021-10-05 DIAGNOSIS — C64.9 PAPILLARY RENAL CELL CARCINOMA (HCC): Primary | ICD-10-CM

## 2021-10-05 NOTE — TELEPHONE ENCOUNTER
Jose Gaona  This patients CT scan chest,abd and pelvis was denied with the insurance  I had to schedule two peer to peers one will be at 2pm and the other will be at 2:15pm since the studies where separate  This will be tomorrow 10/6/21  Let me know the outcome    Thanks  Eben Jasmine

## 2021-10-06 NOTE — TELEPHONE ENCOUNTER
Peer to peer for chest portion performed, denied  Needs CXR first prior to ct chest  Ordered    Did not get a second call regard to the CT abd/pelvis but the first reviewer suspected it would probably be approved and wait for call from second reviewer  Patient has recurrent bladder CA (restaging) and kidney cancer (first six month postop imaging)

## 2021-10-07 NOTE — TELEPHONE ENCOUNTER
Pierre Lee  I just checked the status on the abd/pelvis and says it missed a call so this is still denied  When are you free for a peer to peer to be rescheduled for him?   Thanks  Maeve Fields

## 2021-10-07 NOTE — TELEPHONE ENCOUNTER
Has The Growth Been Previously Biopsied?: has been previously biopsied Hey  I r/s this for tomorrow at 12:30pm  Let me know the outcome    Thanks  Bridgett Bañuelos

## 2021-10-11 ENCOUNTER — HOSPITAL ENCOUNTER (OUTPATIENT)
Dept: RADIOLOGY | Facility: HOSPITAL | Age: 56
Discharge: HOME | End: 2021-10-11
Attending: UROLOGY
Payer: COMMERCIAL

## 2021-10-11 DIAGNOSIS — C64.9 PAPILLARY RENAL CELL CARCINOMA (HCC): ICD-10-CM

## 2021-10-11 PROCEDURE — 74177 CT ABD & PELVIS W/CONTRAST: CPT

## 2021-10-11 PROCEDURE — G1004 CDSM NDSC: HCPCS

## 2021-10-11 PROCEDURE — 71260 CT THORAX DX C+: CPT

## 2021-10-11 RX ADMIN — IOHEXOL 100 ML: 350 INJECTION, SOLUTION INTRAVENOUS at 08:41

## 2021-10-12 ENCOUNTER — TELEPHONE (OUTPATIENT)
Dept: RADIOLOGY | Facility: HOSPITAL | Age: 56
End: 2021-10-12

## 2021-11-07 PROBLEM — Z71.2 ENCOUNTER TO DISCUSS TEST RESULTS: Status: ACTIVE | Noted: 2021-11-07

## 2021-11-07 PROBLEM — N40.1 BENIGN LOCALIZED PROSTATIC HYPERPLASIA WITH LOWER URINARY TRACT SYMPTOMS (LUTS): Status: ACTIVE | Noted: 2021-11-07

## 2021-11-08 ENCOUNTER — TELEPHONE (OUTPATIENT)
Dept: UROLOGY | Facility: CLINIC | Age: 56
End: 2021-11-08

## 2021-11-08 ENCOUNTER — PROCEDURE VISIT (OUTPATIENT)
Dept: UROLOGY | Facility: CLINIC | Age: 56
End: 2021-11-08
Payer: COMMERCIAL

## 2021-11-08 VITALS
SYSTOLIC BLOOD PRESSURE: 162 MMHG | HEIGHT: 72 IN | DIASTOLIC BLOOD PRESSURE: 86 MMHG | WEIGHT: 240 LBS | BODY MASS INDEX: 32.51 KG/M2

## 2021-11-08 DIAGNOSIS — C67.9 BLADDER CARCINOMA (HCC): Primary | ICD-10-CM

## 2021-11-08 DIAGNOSIS — N40.1 BENIGN LOCALIZED PROSTATIC HYPERPLASIA WITH LOWER URINARY TRACT SYMPTOMS (LUTS): ICD-10-CM

## 2021-11-08 DIAGNOSIS — Z71.2 ENCOUNTER TO DISCUSS TEST RESULTS: ICD-10-CM

## 2021-11-08 DIAGNOSIS — Z12.5 PROSTATE CANCER SCREENING: ICD-10-CM

## 2021-11-08 DIAGNOSIS — C64.9 PAPILLARY RENAL CELL CARCINOMA (HCC): ICD-10-CM

## 2021-11-08 PROCEDURE — 3077F SYST BP >= 140 MM HG: CPT | Performed by: UROLOGY

## 2021-11-08 PROCEDURE — 52000 CYSTOURETHROSCOPY: CPT | Performed by: UROLOGY

## 2021-11-08 PROCEDURE — 99214 OFFICE O/P EST MOD 30 MIN: CPT | Performed by: UROLOGY

## 2021-11-08 PROCEDURE — 1036F TOBACCO NON-USER: CPT | Performed by: UROLOGY

## 2021-11-08 PROCEDURE — 3079F DIAST BP 80-89 MM HG: CPT | Performed by: UROLOGY

## 2021-11-10 NOTE — TELEPHONE ENCOUNTER
Cornelio Olson RN Yesterday (9:10 AM)            Called and spoke with patient at this time  Reviewed national shortage of BCG  Advised we will call him once we have dosing available to schedule  He verbalized understanding  Documentation      EULOGIO Emmanuelpelon Lea (9:08 AM)     Ileana Piedra routed conversation to [address] 2 days ago     Ileana Piedra 2 days ago     CW       Patient called in stating he would like to move forward with BCG therapy  Patient inquired when should he get his urine test  Patient can be reached at [facility], [facility] 2 days ago       Daria Alejandro routed conversation to [address] 2 days ago     Daria Alejandro 2 days ago            Per Dr Sharon Sexton, 3 months (around 2/8/2022) for schedule BCG therapy induction  Please assists with scheduling

## 2021-11-11 ENCOUNTER — TELEPHONE (OUTPATIENT)
Dept: OTHER | Facility: OTHER | Age: 56
End: 2021-11-11

## 2021-11-11 LAB
COUNSELING NOTE: NORMAL
CYTOLOGIST CVX/VAG CYTO: NORMAL
DX ICD CODE: NORMAL
PATH REPORT.FINAL DX SPEC: NORMAL
PATH REPORT.GROSS SPEC: NORMAL
PATH REPORT.SITE OF ORIGIN SPEC: NORMAL
PATHOLOGIST NAME: NORMAL

## 2021-11-17 ENCOUNTER — TELEPHONE (OUTPATIENT)
Dept: OTHER | Facility: OTHER | Age: 56
End: 2021-11-17

## 2021-11-17 NOTE — TELEPHONE ENCOUNTER
Called and spoke with patient at this time  Reviewed BCG is available for him and we can schedule 6 weekly appts  He was agreeable  Reviewed (from duplicate encounter) that 3 month cysto is being done due to BCG treatment, recommendation is for 6 weeks of BCG and then cysto 6 weeks later  He verbalized understanding in regards to that  Discussed BCG procedure in depth and after care instructions  Patient agreeable to scheduling as soon as available  This was scheduled to begin next week  He knows we will review procedure and instructions again at first visit as well as sign a consent  He had no further questions at this time

## 2021-11-24 ENCOUNTER — PROCEDURE VISIT (OUTPATIENT)
Dept: UROLOGY | Facility: CLINIC | Age: 56
End: 2021-11-24
Payer: COMMERCIAL

## 2021-11-24 VITALS — WEIGHT: 244 LBS | RESPIRATION RATE: 18 BRPM | BODY MASS INDEX: 33.05 KG/M2 | HEIGHT: 72 IN

## 2021-11-24 DIAGNOSIS — C67.9 BLADDER CARCINOMA (HCC): Primary | ICD-10-CM

## 2021-11-24 LAB
SL AMB  POCT GLUCOSE, UA: NORMAL
SL AMB LEUKOCYTE ESTERASE,UA: NORMAL
SL AMB POCT BILIRUBIN,UA: NORMAL
SL AMB POCT BLOOD,UA: NORMAL
SL AMB POCT CLARITY,UA: CLEAR
SL AMB POCT COLOR,UA: YELLOW
SL AMB POCT KETONES,UA: NORMAL
SL AMB POCT NITRITE,UA: NORMAL
SL AMB POCT PH,UA: 6.5
SL AMB POCT SPECIFIC GRAVITY,UA: 1
SL AMB POCT URINE PROTEIN: NORMAL
SL AMB POCT UROBILINOGEN: 0.2

## 2021-11-24 PROCEDURE — 81002 URINALYSIS NONAUTO W/O SCOPE: CPT

## 2021-11-24 PROCEDURE — 51720 TREATMENT OF BLADDER LESION: CPT

## 2021-11-25 ENCOUNTER — NURSE TRIAGE (OUTPATIENT)
Dept: OTHER | Facility: OTHER | Age: 56
End: 2021-11-25

## 2021-12-01 ENCOUNTER — PROCEDURE VISIT (OUTPATIENT)
Dept: UROLOGY | Facility: CLINIC | Age: 56
End: 2021-12-01
Payer: COMMERCIAL

## 2021-12-01 VITALS
BODY MASS INDEX: 33.59 KG/M2 | RESPIRATION RATE: 18 BRPM | HEIGHT: 72 IN | DIASTOLIC BLOOD PRESSURE: 84 MMHG | SYSTOLIC BLOOD PRESSURE: 148 MMHG | WEIGHT: 248 LBS

## 2021-12-01 DIAGNOSIS — C67.9 BLADDER CARCINOMA (HCC): Primary | ICD-10-CM

## 2021-12-01 LAB
SL AMB  POCT GLUCOSE, UA: NORMAL
SL AMB LEUKOCYTE ESTERASE,UA: NORMAL
SL AMB POCT BILIRUBIN,UA: NORMAL
SL AMB POCT BLOOD,UA: NORMAL
SL AMB POCT CLARITY,UA: CLEAR
SL AMB POCT COLOR,UA: YELLOW
SL AMB POCT KETONES,UA: NORMAL
SL AMB POCT NITRITE,UA: NORMAL
SL AMB POCT PH,UA: 6
SL AMB POCT SPECIFIC GRAVITY,UA: 1.02
SL AMB POCT URINE PROTEIN: NORMAL
SL AMB POCT UROBILINOGEN: 0.2

## 2021-12-01 PROCEDURE — 81002 URINALYSIS NONAUTO W/O SCOPE: CPT

## 2021-12-01 PROCEDURE — 51720 TREATMENT OF BLADDER LESION: CPT

## 2021-12-08 ENCOUNTER — PROCEDURE VISIT (OUTPATIENT)
Dept: UROLOGY | Facility: CLINIC | Age: 56
End: 2021-12-08
Payer: COMMERCIAL

## 2021-12-08 VITALS
SYSTOLIC BLOOD PRESSURE: 150 MMHG | WEIGHT: 240 LBS | BODY MASS INDEX: 32.51 KG/M2 | DIASTOLIC BLOOD PRESSURE: 90 MMHG | HEIGHT: 72 IN | RESPIRATION RATE: 18 BRPM

## 2021-12-08 DIAGNOSIS — C67.9 BLADDER CARCINOMA (HCC): Primary | ICD-10-CM

## 2021-12-08 LAB
SL AMB  POCT GLUCOSE, UA: NORMAL
SL AMB LEUKOCYTE ESTERASE,UA: NORMAL
SL AMB POCT BILIRUBIN,UA: NORMAL
SL AMB POCT BLOOD,UA: NORMAL
SL AMB POCT CLARITY,UA: CLEAR
SL AMB POCT COLOR,UA: YELLOW
SL AMB POCT KETONES,UA: NORMAL
SL AMB POCT NITRITE,UA: NORMAL
SL AMB POCT PH,UA: 7
SL AMB POCT SPECIFIC GRAVITY,UA: 1.01
SL AMB POCT URINE PROTEIN: NORMAL
SL AMB POCT UROBILINOGEN: 0.2

## 2021-12-08 PROCEDURE — 81002 URINALYSIS NONAUTO W/O SCOPE: CPT

## 2021-12-08 PROCEDURE — 51720 TREATMENT OF BLADDER LESION: CPT

## 2021-12-15 ENCOUNTER — PROCEDURE VISIT (OUTPATIENT)
Dept: UROLOGY | Facility: CLINIC | Age: 56
End: 2021-12-15
Payer: COMMERCIAL

## 2021-12-15 VITALS
DIASTOLIC BLOOD PRESSURE: 92 MMHG | SYSTOLIC BLOOD PRESSURE: 144 MMHG | WEIGHT: 240 LBS | HEIGHT: 72 IN | BODY MASS INDEX: 32.51 KG/M2 | RESPIRATION RATE: 18 BRPM

## 2021-12-15 DIAGNOSIS — C67.9 BLADDER CARCINOMA (HCC): Primary | ICD-10-CM

## 2021-12-15 LAB
SL AMB  POCT GLUCOSE, UA: NORMAL
SL AMB LEUKOCYTE ESTERASE,UA: NORMAL
SL AMB POCT BILIRUBIN,UA: NORMAL
SL AMB POCT BLOOD,UA: NORMAL
SL AMB POCT CLARITY,UA: CLEAR
SL AMB POCT COLOR,UA: YELLOW
SL AMB POCT KETONES,UA: NORMAL
SL AMB POCT NITRITE,UA: NORMAL
SL AMB POCT PH,UA: 7
SL AMB POCT SPECIFIC GRAVITY,UA: 1.01
SL AMB POCT URINE PROTEIN: NORMAL
SL AMB POCT UROBILINOGEN: NORMAL

## 2021-12-15 PROCEDURE — 51720 TREATMENT OF BLADDER LESION: CPT

## 2021-12-15 PROCEDURE — 81002 URINALYSIS NONAUTO W/O SCOPE: CPT

## 2021-12-22 ENCOUNTER — PROCEDURE VISIT (OUTPATIENT)
Dept: UROLOGY | Facility: CLINIC | Age: 56
End: 2021-12-22
Payer: COMMERCIAL

## 2021-12-22 VITALS
WEIGHT: 237 LBS | HEIGHT: 72 IN | RESPIRATION RATE: 18 BRPM | SYSTOLIC BLOOD PRESSURE: 142 MMHG | BODY MASS INDEX: 32.1 KG/M2 | DIASTOLIC BLOOD PRESSURE: 88 MMHG

## 2021-12-22 DIAGNOSIS — C67.9 BLADDER CARCINOMA (HCC): Primary | ICD-10-CM

## 2021-12-22 LAB
SL AMB  POCT GLUCOSE, UA: NORMAL
SL AMB LEUKOCYTE ESTERASE,UA: NORMAL
SL AMB POCT BILIRUBIN,UA: NORMAL
SL AMB POCT BLOOD,UA: NORMAL
SL AMB POCT CLARITY,UA: CLEAR
SL AMB POCT COLOR,UA: YELLOW
SL AMB POCT KETONES,UA: NORMAL
SL AMB POCT NITRITE,UA: NORMAL
SL AMB POCT PH,UA: 6.5
SL AMB POCT SPECIFIC GRAVITY,UA: 1.01
SL AMB POCT URINE PROTEIN: NORMAL
SL AMB POCT UROBILINOGEN: 0.2

## 2021-12-22 PROCEDURE — 81002 URINALYSIS NONAUTO W/O SCOPE: CPT

## 2021-12-22 PROCEDURE — 51720 TREATMENT OF BLADDER LESION: CPT

## 2021-12-29 ENCOUNTER — PROCEDURE VISIT (OUTPATIENT)
Dept: UROLOGY | Facility: CLINIC | Age: 56
End: 2021-12-29
Payer: COMMERCIAL

## 2021-12-29 VITALS
SYSTOLIC BLOOD PRESSURE: 160 MMHG | DIASTOLIC BLOOD PRESSURE: 100 MMHG | BODY MASS INDEX: 32.1 KG/M2 | HEIGHT: 72 IN | WEIGHT: 237 LBS | RESPIRATION RATE: 18 BRPM

## 2021-12-29 DIAGNOSIS — C67.9 BLADDER CARCINOMA (HCC): Primary | ICD-10-CM

## 2021-12-29 LAB
SL AMB  POCT GLUCOSE, UA: NORMAL
SL AMB LEUKOCYTE ESTERASE,UA: NORMAL
SL AMB POCT BILIRUBIN,UA: NORMAL
SL AMB POCT BLOOD,UA: NORMAL
SL AMB POCT CLARITY,UA: CLEAR
SL AMB POCT COLOR,UA: YELLOW
SL AMB POCT KETONES,UA: NORMAL
SL AMB POCT NITRITE,UA: NORMAL
SL AMB POCT PH,UA: 6.5
SL AMB POCT SPECIFIC GRAVITY,UA: 1.02
SL AMB POCT URINE PROTEIN: NORMAL
SL AMB POCT UROBILINOGEN: 0.2

## 2021-12-29 PROCEDURE — 3008F BODY MASS INDEX DOCD: CPT | Performed by: UROLOGY

## 2021-12-29 PROCEDURE — 51720 TREATMENT OF BLADDER LESION: CPT

## 2021-12-29 PROCEDURE — 81002 URINALYSIS NONAUTO W/O SCOPE: CPT

## 2022-01-10 ENCOUNTER — NURSE TRIAGE (OUTPATIENT)
Dept: OTHER | Facility: OTHER | Age: 57
End: 2022-01-10

## 2022-01-10 ENCOUNTER — TELEMEDICINE (OUTPATIENT)
Dept: FAMILY MEDICINE CLINIC | Facility: CLINIC | Age: 57
End: 2022-01-10
Payer: COMMERCIAL

## 2022-01-10 DIAGNOSIS — B34.9 VIRAL INFECTION, UNSPECIFIED: ICD-10-CM

## 2022-01-10 DIAGNOSIS — J01.00 ACUTE NON-RECURRENT MAXILLARY SINUSITIS: Primary | ICD-10-CM

## 2022-01-10 PROBLEM — I25.84 CALCIFICATION OF CORONARY ARTERY: Status: ACTIVE | Noted: 2021-08-04

## 2022-01-10 PROBLEM — I51.7 LEFT VENTRICULAR HYPERTROPHY: Status: ACTIVE | Noted: 2021-06-03

## 2022-01-10 PROBLEM — I11.9 HYPERTENSIVE HEART DISEASE WITHOUT CONGESTIVE HEART FAILURE: Status: ACTIVE | Noted: 2021-08-04

## 2022-01-10 PROBLEM — I25.10 CALCIFICATION OF CORONARY ARTERY: Status: ACTIVE | Noted: 2021-08-04

## 2022-01-10 PROCEDURE — 1036F TOBACCO NON-USER: CPT | Performed by: FAMILY MEDICINE

## 2022-01-10 PROCEDURE — 99213 OFFICE O/P EST LOW 20 MIN: CPT | Performed by: FAMILY MEDICINE

## 2022-01-10 RX ORDER — CEFDINIR 300 MG/1
600 CAPSULE ORAL DAILY
Qty: 20 CAPSULE | Refills: 0 | Status: SHIPPED | OUTPATIENT
Start: 2022-01-10 | End: 2022-01-20

## 2022-01-10 NOTE — PROGRESS NOTES
Virtual Regular Visit    Verification of patient location:    Patient is located in the following state in which I hold an active license [place]      Assessment/Plan:    Problem List Items Addressed This Visit        Active Problems    Acute non-recurrent maxillary sinusitis - Primary    Relevant Medications    cefdinir (OMNICEF) 300 mg capsule      Other Visit Diagnoses     Viral infection, unspecified        Relevant Orders    Covid/Flu- [facility]        Possible sinusitis  Send for covid testing  F/u if no better       Reason for visit is   Chief Complaint   Patient presents with    Virtual Regular Visit        Encounter provider Raisa Rouse DO    Provider located at  [address] [place] [id number]      Recent Visits  No visits were found meeting these conditions  Showing recent visits within past 7 days and meeting all other requirements  Today's Visits  Date Type Provider Dept   01/10/22 [address] [facility], [address] [place] [address] today's visits and meeting all other requirements  Future Appointments  No visits were found meeting these conditions  Showing future appointments within next 150 days and meeting all other requirements       The patient was identified by name and date of birth  Lesa San was informed that this is a telemedicine visit and that the visit is being conducted through [address] [place] [address] Now and patient was informed that this is a secure, HIPAA-compliant platform  He agrees to proceed     My office door was closed  No one else was in the room  He acknowledged consent and understanding of privacy and security of the video platform  The patient has agreed to participate and understands they can discontinue the visit at any time  Patient is aware this is a billable service  Subjective  Lesa San is a 64 y o  male with uri         HPI   Head congestion  Chest congestion  Yellow phlegm  1/8/22    Still working  No covid vaccines  Declines    Home test neg covid yesterday    No otc  Some tylenol    Temp to 102    Last close exposure to confirmed case:  ____? Last close exposure to possible case:  ___? Fever_________________________y  Cough:  ___________________y  SOB:  __________________n  Loss of taste:  _________n  Loss of smell:  ______n  Other symptoms:  __congestion    First day of symptoms:  ______1/8/22  Covid test in past:   _______pos 4/3/21  Had covid vaccine:   ____n, declines  PUI status advised:   __y    History reviewed  No pertinent past medical history  Past Surgical History:   Procedure Laterality Date    CHOLECYSTECTOMY      HERNIA REPAIR      bilateral linguinal mesh    NASAL SEPTUM SURGERY      NEUROPLASTY / TRANSPOSITION ULNAR NERVE AT ELBOW Left     VA COLONOSCOPY FLX DX W/COLLJ SPEC WHEN PFRMD N/A 3/11/2016    Procedure: COLONOSCOPY;  Surgeon: Raj Hernandez MD;  Location: [place] [address] GI LAB; Service: Gastroenterology       Current Outpatient Medications   Medication Sig Dispense Refill    cefdinir (OMNICEF) 300 mg capsule Take 2 capsules (600 mg total) by mouth daily for 10 days 20 capsule 0    losartan (COZAAR) 25 mg tablet Take 25 mg by mouth every morning      rosuvastatin (CRESTOR) 20 MG tablet Take 20 mg by mouth every morning       No current facility-administered medications for this visit  No Known Allergies    Review of Systems   Constitutional: Positive for fever  Respiratory: Positive for cough  Video Exam    There were no vitals filed for this visit  Physical Exam  Constitutional:       General: He is not in acute distress  Appearance: He is not ill-appearing  HENT:      Head: Normocephalic  Eyes:      General: No scleral icterus  Neck:      Comments: No stiffness  Pulmonary:      Effort: No respiratory distress  Breath sounds: No stridor        Comments: Speaks full sentences  Musculoskeletal:         General: No deformity  Skin:     Coloration: Skin is not pale  I spent 16 minutes directly with the patient during this visit    VIRTUAL VISIT [address] [place] verbally agrees to participate in [employer]  Pt is aware that [employer] could be limited without vital signs or the ability to perform a full hands-on physical [place] understands he or the provider may request at any time to terminate the video visit and request the patient to seek care or treatment in person

## 2022-01-10 NOTE — TELEPHONE ENCOUNTER
Reason for Disposition   [1] Sinus congestion as part of a cold AND [2] present < 10 days    Answer Assessment - Initial Assessment Questions  1  LOCATION: "Where does it hurt?"       Denies sinus pain, but has pressure    2  ONSET: "When did the sinus pain start?"  (e g , hours, days)       Saturday night    3  SEVERITY: "How bad is the pain?"   (Scale 1-10; mild, moderate or severe)    - MILD (1-3): doesn't interfere with normal activities     - MODERATE (4-7): interferes with normal activities (e g , work or school) or awakens from sleep    - SEVERE (8-10): excruciating pain and patient unable to do any normal activities         4/10    4  RECURRENT SYMPTOM: "Have you ever had sinus problems before?" If Yes, ask: "When was the last time?" and "What happened that time?"       Yes    5  NASAL CONGESTION: "Is the nose blocked?" If Yes, ask: "Can you open it or must you breathe through the mouth?"      Yes    6  NASAL DISCHARGE: "Do you have discharge from your nose?" If so ask, "What color?"      Denies    7  FEVER: "Do you have a fever?" If Yes, ask: "What is it, how was it measured, and when did it start?"       Yes, temp of 98 8 after tylenol    8   OTHER SYMPTOMS: "Do you have any other symptoms?" (e g , sore throat, cough, earache, difficulty breathing)      Coughing up phlegm that is yellow in color    Did at home covid test which was negative    Protocols used: SINUS PAIN OR CONGESTION-ADULT-

## 2022-01-10 NOTE — TELEPHONE ENCOUNTER
Regarding: Possible sinus infection  ----- Message from [facility] sent at 1/9/2022 11:28 AM EST -----  "I think I have a sinus/upper respiratory infection, when I cough up phlegm it is yellow   Can something be called in?"

## 2022-02-03 NOTE — PROGRESS NOTES
Problem List Items Addressed This Visit        Genitourinary    Bladder carcinoma ([facility] [address] )    Papillary renal cell carcinoma (HCC)    Relevant Orders    Cytology, urine    POCT Measure PVR (Completed)    Benign localized prostatic hyperplasia with lower urinary tract symptoms (LUTS) - Primary       Other    Encounter to discuss test results            Discussion:  Adrian aRmos is doing well  ECOG of 0, has a history of bladder cancer status post recurrence and multiple resections with BCG, cystoscopy today is clean  Voiding well at this time, PVR is 0  Also with a history of papillary renal cell carcinoma status post partial nephrectomy, due for follow-up imaging in roughly 3 months, he will see me in 3 months after his CT scan to review this for his history of papillary renal cell carcinoma with tumor violation during his partial nephrectomy, and for surveillance cystoscopy  Currently voiding well    Assessment and plan:       Please see problem oriented charting for the assessment plan of today's urological complaints      Cheyanne Alex MD      Chief Complaint     As above      History of Present Illness     Guicho Rush is a 64 y o  man with a history of renal cell carcinoma as well as bladder cancer  He is s/p BCG therapy, states that this went well  His bladder is clean, no concerning lesions  He will be due for follow-up cystoscopy in 3 months    New complaints include some ongoing anxiety, some eventration at 1 of his port sites, no hernia on exam   Otherwise ECOG is 0, no activities of daily living impairment    The following portions of the patient's history were reviewed and updated as appropriate: allergies, current medications, past family history, past medical history, past social history, past surgical history and problem list       Detailed Urologic History     - please refer to HPI    Review of Systems     Review of Systems   Constitutional: Negative  HENT: Negative  Eyes: Negative  Respiratory: Negative  Cardiovascular: Negative  Gastrointestinal: Negative  Endocrine: Negative  Genitourinary: Negative  Musculoskeletal: Negative  Skin: Negative  Allergic/Immunologic: Negative  Neurological: Negative  Hematological: Negative  Psychiatric/Behavioral: Negative  Allergies     No Known Allergies    Physical Exam     Physical Exam  Vitals reviewed  Constitutional:       General: He is not in acute distress  Appearance: Normal appearance  He is obese  He is not ill-appearing, toxic-appearing or diaphoretic  HENT:      Head: Normocephalic and atraumatic  Eyes:      General: No scleral icterus  Right eye: No discharge  Left eye: No discharge  Cardiovascular:      Pulses: Normal pulses  Pulmonary:      Effort: Pulmonary effort is normal    Abdominal:      General: There is no distension  Palpations: There is no mass  Genitourinary:     Penis: Normal     Musculoskeletal:         General: No swelling  Skin:     General: Skin is warm  Neurological:      General: No focal deficit present  Mental Status: He is alert and oriented to person, place, and time  Cranial Nerves: No cranial nerve deficit  Psychiatric:         Mood and Affect: Mood normal          Behavior: Behavior normal          Thought Content:  Thought content normal          Judgment: Judgment normal              Vital Signs  Vitals:    02/08/22 0819   BP: 138/86   BP Location: Left arm   Patient Position: Sitting   Cuff Size: Adult   Pulse: 76   SpO2: 98%   Weight: 107 kg (236 lb)         Current Medications       Current Outpatient Medications:     losartan (COZAAR) 25 mg tablet, Take 25 mg by mouth every morning, Disp: , Rfl:     rosuvastatin (CRESTOR) 20 MG tablet, Take 20 mg by mouth every morning, Disp: , Rfl:       Active Problems     Patient Active Problem List   Diagnosis    Actinic keratoses    Bladder carcinoma ([facility] [address] )    Fatty liver    Abdominal hernia    High triglycerides    Hyperlipidemia LDL goal <160    Seborrheic keratosis    Screening for cardiovascular, respiratory, and genitourinary diseases    Screening for diabetes mellitus (DM)    Prostate cancer screening    Prediabetes    Healthcare maintenance    [place] cardiac risk 10-20% in next 10 years    Obesity (BMI 30-39  9)    Thyroid nodule    Eczema    Venous anomaly    Essential hypertension    Family history of coronary arteriosclerosis    Rash    Papillary renal cell carcinoma ([address] )    Encounter to discuss test results    Benign localized prostatic hyperplasia with lower urinary tract symptoms (LUTS)    Hypertensive heart disease without congestive heart failure    Left ventricular hypertrophy    Calcification of coronary artery    Acute non-recurrent maxillary sinusitis         Past Medical History     History reviewed  No pertinent past medical history  Surgical History     Past Surgical History:   Procedure Laterality Date    CHOLECYSTECTOMY      HERNIA REPAIR      bilateral linguinal mesh    NASAL SEPTUM SURGERY      NEUROPLASTY / TRANSPOSITION ULNAR NERVE AT ELBOW Left     IN COLONOSCOPY FLX DX W/COLLJ SPEC WHEN PFRMD N/A 3/11/2016    Procedure: COLONOSCOPY;  Surgeon: Melody Borges MD;  Location: [place] [address] [facility]; Service: Gastroenterology         Family History     History reviewed  No pertinent family history        Social History     Social History     Social History     Tobacco Use   Smoking Status Former Smoker    Packs/day: 2 00    Years: 40 00    Pack years: 80 00    Quit date: 2016    Years since quittin 7   Smokeless Tobacco Never Used         Pertinent Lab Values     Lab Results   Component Value Date    CREATININE 1 21 2021       Lab Results   Component Value Date    PSA 1 7 2021    PSA 2 6 2020    PSA 2 2 2019             Pertinent Imaging      No new imaging for my review

## 2022-02-08 ENCOUNTER — PROCEDURE VISIT (OUTPATIENT)
Dept: UROLOGY | Facility: CLINIC | Age: 57
End: 2022-02-08
Payer: COMMERCIAL

## 2022-02-08 VITALS
WEIGHT: 236 LBS | HEART RATE: 76 BPM | BODY MASS INDEX: 32.46 KG/M2 | SYSTOLIC BLOOD PRESSURE: 138 MMHG | DIASTOLIC BLOOD PRESSURE: 86 MMHG | OXYGEN SATURATION: 98 %

## 2022-02-08 DIAGNOSIS — Z71.2 ENCOUNTER TO DISCUSS TEST RESULTS: ICD-10-CM

## 2022-02-08 DIAGNOSIS — C64.9 PAPILLARY RENAL CELL CARCINOMA (HCC): ICD-10-CM

## 2022-02-08 DIAGNOSIS — C67.9 BLADDER CARCINOMA (HCC): ICD-10-CM

## 2022-02-08 DIAGNOSIS — N40.1 BENIGN LOCALIZED PROSTATIC HYPERPLASIA WITH LOWER URINARY TRACT SYMPTOMS (LUTS): Primary | ICD-10-CM

## 2022-02-08 LAB — POST-VOID RESIDUAL VOLUME, ML POC: 0 ML

## 2022-02-08 PROCEDURE — 1036F TOBACCO NON-USER: CPT | Performed by: UROLOGY

## 2022-02-08 PROCEDURE — 99214 OFFICE O/P EST MOD 30 MIN: CPT | Performed by: UROLOGY

## 2022-02-08 PROCEDURE — 3075F SYST BP GE 130 - 139MM HG: CPT | Performed by: UROLOGY

## 2022-02-08 PROCEDURE — 88112 CYTOPATH CELL ENHANCE TECH: CPT | Performed by: PATHOLOGY

## 2022-02-08 PROCEDURE — 3079F DIAST BP 80-89 MM HG: CPT | Performed by: UROLOGY

## 2022-02-08 PROCEDURE — 51798 US URINE CAPACITY MEASURE: CPT | Performed by: UROLOGY

## 2022-02-08 PROCEDURE — 52000 CYSTOURETHROSCOPY: CPT | Performed by: UROLOGY

## 2022-02-08 NOTE — PROGRESS NOTES
Office Cystoscopy Procedure Note    Indication:    Urothelial carcinoma of the bladder    Informed consent   The risks, benefits, complications, treatment options, and expected outcomes were discussed with the patient  The patient concurred with the proposed plan and provided informed consent  Anesthesia  Lidocaine jelly 2%    Antibiotic prophylaxis   None    Procedure  The patient was placed in the supineposition, was prepped and draped in the usual manner using sterile technique, and 2% lidocaine jelly instilled into the urethra  A 17 F flexible cystoscope was then inserted into the urethra and the urethra and bladder carefully examined  The following findings were noted:    Findings:   Urethra:  Normal, no lesions  Prostate:  Lateral lobe hypertrophy, no lesions  Bladder:  No recurrence, no CIS, no masses, no lesions  Ureteral orifices:  orthotopic  Other findings:  None, retroflex view confirms    Specimens: None                 Complications:    None; patient tolerated the procedure well           Disposition: To home            Condition: Stable    Plan: No recurrence, due for surveillance cystoscopy in 3 months       Cystoscopy     Date/Time 2/8/2022 9:03 AM     Performed by  Chelo Stoddard MD     Authorized by Chelo Stoddard MD      Universal Protocol:  Consent: Verbal consent obtained  Written consent obtained  Risks and benefits: risks, benefits and alternatives were discussed  Consent given by: patient  Patient understanding: patient states understanding of the procedure being performed  Patient consent: the patient's understanding of the procedure matches consent given  Procedure consent: procedure consent matches procedure scheduled  Relevant documents: relevant documents present and verified  Test results: test results available and properly labeled  Site marked: the operative site was not marked  Radiology Images displayed and confirmed   If images not available, report reviewed: imaging studies available  Required items: required blood products, implants, devices, and special equipment available  Patient identity confirmed: verbally with patient and provided demographic data        Procedure Details:  Procedure type: cystoscopy    Patient tolerance: Patient tolerated the procedure well with no immediate complications    Additional Procedure Details: Office Cystoscopy Procedure Note    Indication:    Urothelial carcinoma of the bladder    Informed consent   The risks, benefits, complications, treatment options, and expected outcomes were discussed with the patient  The patient concurred with the proposed plan and provided informed consent  Anesthesia  Lidocaine jelly 2%    Antibiotic prophylaxis   None    Procedure  The patient was placed in the supineposition, was prepped and draped in the usual manner using sterile technique, and 2% lidocaine jelly instilled into the urethra  A 17 F flexible cystoscope was then inserted into the urethra and the urethra and bladder carefully examined    The following findings were noted:    Findings:   Urethra:  Normal, no lesions  Prostate:  Lateral lobe hypertrophy, no lesions  Bladder:  No recurrence, no CIS, no masses, no lesions  Ureteral orifices:  orthotopic  Other findings:  None, retroflex view confirms    Specimens: None                 Complications:    None; patient tolerated the procedure well           Disposition: To home            Condition: Stable    Plan: No recurrence, due for surveillance cystoscopy in 3 months

## 2022-02-08 NOTE — LETTER
February 8, 2022     [address] [address]    Patient: John Sen   YOB: 1965   Date of Visit: 2/8/2022       Dear Dr Beny Villagran: Thank you for referring Elidia Patel to me for evaluation  Below are my notes for this consultation  If you have questions, please do not hesitate to call me  I look forward to following your patient along with you  Sincerely,        Rosalinda Schneider MD        CC: No Recipients  Rosalinda Schneider MD  2/8/2022  9:05 AM  Sign when Signing Visit  Office Cystoscopy Procedure Note    Indication:    Urothelial carcinoma of the bladder    Informed consent   The risks, benefits, complications, treatment options, and expected outcomes were discussed with the patient  The patient concurred with the proposed plan and provided informed consent  Anesthesia  Lidocaine jelly 2%    Antibiotic prophylaxis   None    Procedure  The patient was placed in the supineposition, was prepped and draped in the usual manner using sterile technique, and 2% lidocaine jelly instilled into the urethra  A 17 F flexible cystoscope was then inserted into the urethra and the urethra and bladder carefully examined  The following findings were noted:    Findings:   Urethra:  Normal, no lesions  Prostate:  Lateral lobe hypertrophy, no lesions  Bladder:  No recurrence, no CIS, no masses, no lesions  Ureteral orifices:  orthotopic  Other findings:  None, retroflex view confirms    Specimens: None                 Complications:    None; patient tolerated the procedure well           Disposition: To home            Condition: Stable    Plan: No recurrence, due for surveillance cystoscopy in 3 months       Cystoscopy     Date/Time 2/8/2022 9:03 AM     Performed by  Rosalinda Schneider MD     Authorized by Rosalinda Schneider MD      Universal Protocol:  Consent: Verbal consent obtained  Written consent obtained    Risks and benefits: risks, benefits and alternatives were discussed  Consent given by: patient  Patient understanding: patient states understanding of the procedure being performed  Patient consent: the patient's understanding of the procedure matches consent given  Procedure consent: procedure consent matches procedure scheduled  Relevant documents: relevant documents present and verified  Test results: test results available and properly labeled  Site marked: the operative site was not marked  Radiology Images displayed and confirmed  If images not available, report reviewed: imaging studies available  Required items: required blood products, implants, devices, and special equipment available  Patient identity confirmed: verbally with patient and provided demographic data        Procedure Details:  Procedure type: cystoscopy    Patient tolerance: Patient tolerated the procedure well with no immediate complications    Additional Procedure Details: Office Cystoscopy Procedure Note    Indication:    Urothelial carcinoma of the bladder    Informed consent   The risks, benefits, complications, treatment options, and expected outcomes were discussed with the patient  The patient concurred with the proposed plan and provided informed consent  Anesthesia  Lidocaine jelly 2%    Antibiotic prophylaxis   None    Procedure  The patient was placed in the supineposition, was prepped and draped in the usual manner using sterile technique, and 2% lidocaine jelly instilled into the urethra  A 17 F flexible cystoscope was then inserted into the urethra and the urethra and bladder carefully examined    The following findings were noted:    Findings:   Urethra:  Normal, no lesions  Prostate:  Lateral lobe hypertrophy, no lesions  Bladder:  No recurrence, no CIS, no masses, no lesions  Ureteral orifices:  orthotopic  Other findings:  None, retroflex view confirms    Specimens: None                 Complications:    None; patient tolerated the procedure well Disposition: To home            Condition: Stable    Plan: No recurrence, due for surveillance cystoscopy in 3 months                Thuy Gurrola MD  2/8/2022  8:57 AM  Sign when Signing Visit       Problem List Items Addressed This Visit        Genitourinary    Bladder carcinoma ([facility] [address] )    Papillary renal cell carcinoma ([facility] [address] )    Relevant Orders    Cytology, urine    POCT Measure PVR (Completed)    Benign localized prostatic hyperplasia with lower urinary tract symptoms (LUTS) - Primary       Other    Encounter to discuss test results            Discussion:  Apple Grider is doing well  ECOG of 0, has a history of bladder cancer status post recurrence and multiple resections with BCG, cystoscopy today is clean  Voiding well at this time, PVR is 0  Also with a history of papillary renal cell carcinoma status post partial nephrectomy, due for follow-up imaging in roughly 3 months, he will see me in 3 months after his CT scan to review this for his history of papillary renal cell carcinoma with tumor violation during his partial nephrectomy, and for surveillance cystoscopy  Currently voiding well    Assessment and plan:       Please see problem oriented charting for the assessment plan of today's urological complaints      Thuy Gurrola MD      Chief Complaint     As above      History of Present Illness     Casandra Cohen is a 64 y o  man with a history of renal cell carcinoma as well as bladder cancer  He is s/p BCG therapy, states that this went well  His bladder is clean, no concerning lesions    He will be due for follow-up cystoscopy in 3 months    New complaints include some ongoing anxiety, some eventration at 1 of his port sites, no hernia on exam   Otherwise ECOG is 0, no activities of daily living impairment    The following portions of the patient's history were reviewed and updated as appropriate: allergies, current medications, past family history, past medical history, past social history, past surgical history and problem list       Detailed Urologic History     - please refer to HPI    Review of Systems     Review of Systems   Constitutional: Negative  HENT: Negative  Eyes: Negative  Respiratory: Negative  Cardiovascular: Negative  Gastrointestinal: Negative  Endocrine: Negative  Genitourinary: Negative  Musculoskeletal: Negative  Skin: Negative  Allergic/Immunologic: Negative  Neurological: Negative  Hematological: Negative  Psychiatric/Behavioral: Negative  Allergies     No Known Allergies    Physical Exam     Physical Exam  Vitals reviewed  Constitutional:       General: He is not in acute distress  Appearance: Normal appearance  He is obese  He is not ill-appearing, toxic-appearing or diaphoretic  HENT:      Head: Normocephalic and atraumatic  Eyes:      General: No scleral icterus  Right eye: No discharge  Left eye: No discharge  Cardiovascular:      Pulses: Normal pulses  Pulmonary:      Effort: Pulmonary effort is normal    Abdominal:      General: There is no distension  Palpations: There is no mass  Genitourinary:     Penis: Normal     Musculoskeletal:         General: No swelling  Skin:     General: Skin is warm  Neurological:      General: No focal deficit present  Mental Status: He is alert and oriented to person, place, and time  Cranial Nerves: No cranial nerve deficit  Psychiatric:         Mood and Affect: Mood normal          Behavior: Behavior normal          Thought Content:  Thought content normal          Judgment: Judgment normal              Vital Signs  Vitals:    02/08/22 0819   BP: 138/86   BP Location: Left arm   Patient Position: Sitting   Cuff Size: Adult   Pulse: 76   SpO2: 98%   Weight: 107 kg (236 lb)         Current Medications       Current Outpatient Medications:     losartan (COZAAR) 25 mg tablet, Take 25 mg by mouth every morning, Disp: , Rfl:     rosuvastatin (CRESTOR) 20 MG tablet, Take 20 mg by mouth every morning, Disp: , Rfl:       Active Problems     Patient Active Problem List   Diagnosis    Actinic keratoses    Bladder carcinoma ([facility] [address] )    Fatty liver    Abdominal hernia    High triglycerides    Hyperlipidemia LDL goal <160    Seborrheic keratosis    Screening for cardiovascular, respiratory, and genitourinary diseases    Screening for diabetes mellitus (DM)    Prostate cancer screening    Prediabetes    Healthcare maintenance    [place] cardiac risk 10-20% in next 10 years    Obesity (BMI 30-39  9)    Thyroid nodule    Eczema    Venous anomaly    Essential hypertension    Family history of coronary arteriosclerosis    Rash    Papillary renal cell carcinoma ([facility] [address] )    Encounter to discuss test results    Benign localized prostatic hyperplasia with lower urinary tract symptoms (LUTS)    Hypertensive heart disease without congestive heart failure    Left ventricular hypertrophy    Calcification of coronary artery    Acute non-recurrent maxillary sinusitis         Past Medical History     History reviewed  No pertinent past medical history  Surgical History     Past Surgical History:   Procedure Laterality Date    CHOLECYSTECTOMY      HERNIA REPAIR      bilateral linguinal mesh    NASAL SEPTUM SURGERY      NEUROPLASTY / TRANSPOSITION ULNAR NERVE AT ELBOW Left     [place] COLONOSCOPY FLX DX W/COLLJ SPEC WHEN PFRMD N/A 3/11/2016    Procedure: COLONOSCOPY;  Surgeon: Walter Dhillon MD;  Location: [facility]; Service: Gastroenterology         Family History     History reviewed  No pertinent family history        Social History     Social History     Social History     Tobacco Use   Smoking Status Former Smoker    Packs/day: 2 00    Years: 40 00    Pack years: 80 00    Quit date: 2016    Years since quittin 7   Smokeless Tobacco Never Used         Pertinent Lab Values     Lab Results   Component Value Date    CREATININE  09/30/2021       Lab Results   Component Value Date    PSA 1 7 01/25/2021    PSA 2 6 01/22/2020    PSA 2 2 01/18/2019             Pertinent Imaging      No new imaging for my review

## 2022-05-05 ENCOUNTER — TELEPHONE (OUTPATIENT)
Dept: UROLOGY | Facility: AMBULATORY SURGERY CENTER | Age: 57
End: 2022-05-05

## 2022-05-05 DIAGNOSIS — C67.9 BLADDER CARCINOMA (HCC): Primary | ICD-10-CM

## 2022-05-05 NOTE — TELEPHONE ENCOUNTER
[facility] cat scan dept calling stating the patient has his appointment on 5/9 and needs creatinine and bun lab orders placed to get done before the scan       Patient will need to be notified of these orders and can be reached at [phone]

## 2022-05-05 NOTE — TELEPHONE ENCOUNTER
Immediate Brief Procedure Note    Patient Name:  Everardo Milan  YOB: 1969  DATE OF PROCEDURE : 6/13/2020  PROCEDURALIST: Juan A Burton MD  ASSISTANT(S):  None  ANESTHESIA TYPE:  Local  ANESTHESIOLOGIST:  None    PROCEDURE PERFORMED:  Ultrasound Guided RIGHT Paracentesis    Pre-procedure Dx:   Patient Active Problem List   Diagnosis   • Acute on Chronic Liver Failure (6/1/2020 ACLF grade III, TOO-C score 61 => 3 Mo mortality predicted 81%. MELD 23, 3 month mortality predicted 20%)   • Hepatic encephalopathy (5/31/2020 Grade III)   • Portal hypertension (5/28/2020 EGD: Varices => Bands @ [facility])   • Severe protein-calorie malnutrition (CMS/HCC)   • Small bowel obstruction (CMS/HCC)   • Spontaneous bacterial peritonitis (6/1/2020 Ascites: 3475 WBC 39%P 40%L 17%M)       Post-procedure Dx: Same    Findings: Technically successful ultrasound guided paracentesis.    Estimated Blood Loss: Less than 5 ml.    Complications: None noted    Specimens Removed: Yes   Patient called to find out if he needed to fast for lab work and I informed him that he did not need to fast but needs to have it done ASAP, prior to his CT on Monday

## 2022-05-05 NOTE — TELEPHONE ENCOUNTER
Informed patient of labwork needed and subsequent orders placed    Patient agrees and will go today or tomorrwo

## 2022-05-06 ENCOUNTER — TELEPHONE (OUTPATIENT)
Dept: OTHER | Facility: OTHER | Age: 57
End: 2022-05-06

## 2022-05-06 LAB
BUN SERPL-MCNC: 13 MG/DL (ref 6–24)
CREAT SERPL-MCNC: 1.11 MG/DL (ref 0.76–1.27)
EGFR: 78 ML/MIN/1.73

## 2022-05-06 NOTE — TELEPHONE ENCOUNTER
[facility] called to inform that there is an iv contrast shortage and have to delay pt's scan for at least a week  Pt was already informed  Post scan appt scheduled for 5/19 will need to be rescheduled

## 2022-05-17 ENCOUNTER — HOSPITAL ENCOUNTER (OUTPATIENT)
Dept: RADIOLOGY | Facility: HOSPITAL | Age: 57
Discharge: HOME | End: 2022-05-17
Attending: UROLOGY
Payer: COMMERCIAL

## 2022-05-17 DIAGNOSIS — C67.9 BLADDER CARCINOMA (HCC): ICD-10-CM

## 2022-05-17 DIAGNOSIS — C64.9 PAPILLARY RENAL CELL CARCINOMA (HCC): ICD-10-CM

## 2022-05-17 PROCEDURE — G1004 CDSM NDSC: HCPCS

## 2022-05-17 PROCEDURE — 74178 CT ABD&PLV WO CNTR FLWD CNTR: CPT

## 2022-05-17 RX ADMIN — IOHEXOL 65 ML: 350 INJECTION, SOLUTION INTRAVENOUS at 08:41

## 2022-05-17 NOTE — PROGRESS NOTES
Problem List Items Addressed This Visit        Genitourinary    Bladder carcinoma ([facility] [address] ) - Primary     High risk bladder cancer status post BCG and multiple resections, no evidence of recurrence on cystoscopy today, urine cytology sent, if converged positive we will offer random bladder biopsies, in the absence of this we will continue surveillance as per the NCCN guidelines for high-risk bladder cancer           Relevant Orders    Cytology, urine    Papillary renal cell carcinoma ([facility] [address] )     Status post partial nephrectomy, no evidence of recurrence in the area of partial nephrectomy  Will be due for repeat upper tract imaging in 1 year           Benign localized prostatic hyperplasia with lower urinary tract symptoms (LUTS)     Voiding well at this time off of therapy, continue active surveillance of lower urinary tract symptoms              Other    Encounter to discuss test results     ECOG of 0, no new complaints, continues with some baseline anxiety and some stressors at home including some family members being ill and having dementia, test results reviewed with him in detail                       Assessment and plan:       Please see problem oriented charting for the assessment plan of today's urological complaints    Praveen Kent MD      Chief Complaint     Chief Complaint   Patient presents with    Cystoscopy         History of Present Illness     Naima Wilson is a 64 y o  man with high-grade urothelial carcinoma status post resection with BCG therapy, last surveillance cystoscopy was negative  He also underwent a robotic left partial nephrectomy  Surveillance imaging has been negative for repeat lesions or growth in the partial nephrectomy bed  His last urine cytology showed some atypical cells but no positive urothelial cells  A repeat cytology has been sent today  ECOG performance status is 0  New complaints today include none      The following portions of the patient's history were reviewed and updated as appropriate: allergies, current medications, past family history, past medical history, past social history, past surgical history and problem list         Detailed Urologic History     - please refer to HPI    Review of Systems     Review of Systems   Constitutional: Negative  HENT: Negative  Eyes: Negative  Respiratory: Negative  Cardiovascular: Negative  Gastrointestinal: Negative  Endocrine: Negative  Genitourinary: Negative  Musculoskeletal: Negative  Skin: Negative  Allergic/Immunologic: Negative  Neurological: Negative  Hematological: Negative  Psychiatric/Behavioral: The patient is nervous/anxious  Allergies     No Known Allergies    Physical Exam     Physical Exam  Vitals reviewed  Constitutional:       General: He is not in acute distress  Appearance: Normal appearance  He is obese  He is not ill-appearing or toxic-appearing  HENT:      Head: Normocephalic and atraumatic  Eyes:      General: No scleral icterus  Right eye: No discharge  Left eye: No discharge  Cardiovascular:      Pulses: Normal pulses  Pulmonary:      Effort: Pulmonary effort is normal    Abdominal:      General: There is no distension  Palpations: There is no mass  Tenderness: There is no abdominal tenderness  Hernia: No hernia is present  Genitourinary:     Penis: Normal     Musculoskeletal:         General: No swelling  Skin:     General: Skin is warm  Neurological:      General: No focal deficit present  Mental Status: He is alert and oriented to person, place, and time  Cranial Nerves: No cranial nerve deficit  Psychiatric:         Mood and Affect: Mood normal          Behavior: Behavior normal          Thought Content:  Thought content normal          Judgment: Judgment normal              Vital Signs  Vitals:    05/19/22 0824   BP: 152/84   BP Location: Left arm   Patient Position: Sitting   Cuff Size: Adult   Pulse: 86   Resp: 18   SpO2: 98%   Weight: 107 kg (236 lb)   Height: 5' 11" (1 803 m)         Current Medications       Current Outpatient Medications:     losartan (COZAAR) 25 mg tablet, Take 25 mg by mouth every morning, Disp: , Rfl:     rosuvastatin (CRESTOR) 20 MG tablet, Take 20 mg by mouth every morning, Disp: , Rfl:       Active Problems     Patient Active Problem List   Diagnosis    Actinic keratoses    Bladder carcinoma ([facility] [address] )    Fatty liver    Abdominal hernia    High triglycerides    Hyperlipidemia LDL goal <160    Seborrheic keratosis    Screening for cardiovascular, respiratory, and genitourinary diseases    Screening for diabetes mellitus (DM)    Prostate cancer screening    Prediabetes    Healthcare maintenance    [place] cardiac risk 10-20% in next 10 years    Obesity (BMI 30-39  9)    Thyroid nodule    Eczema    Venous anomaly    Essential hypertension    Family history of coronary arteriosclerosis    Rash    Papillary renal cell carcinoma ([facility] [address] )    Encounter to discuss test results    Benign localized prostatic hyperplasia with lower urinary tract symptoms (LUTS)    Hypertensive heart disease without congestive heart failure    Left ventricular hypertrophy    Calcification of coronary artery    Acute non-recurrent maxillary sinusitis         Past Medical History     History reviewed  No pertinent past medical history  Surgical History     Past Surgical History:   Procedure Laterality Date    CHOLECYSTECTOMY      HERNIA REPAIR      bilateral linguinal mesh    NASAL SEPTUM SURGERY      NEUROPLASTY / TRANSPOSITION ULNAR NERVE AT ELBOW Left     [place] COLONOSCOPY FLX DX W/COLLJ SPEC WHEN PFRMD N/A 3/11/2016    Procedure: COLONOSCOPY;  Surgeon: Olesya Mitchell MD;  Location: [place] [address] GI LAB; Service: Gastroenterology         Family History     History reviewed  No pertinent family history        Social History     Social History     Social History Tobacco Use   Smoking Status Former Smoker    Packs/day: 2 00    Years: 40 00    Pack years: 80 00    Quit date: 2016    Years since quittin 0   Smokeless Tobacco Never Used         Pertinent Lab Values     Lab Results   Component Value Date    CREATININE 1 11 2022       Lab Results   Component Value Date    PSA 1 7 2021    PSA 2 6 2020    PSA 2 2 2019             Pertinent Imaging      IMPRESSION:     Postsurgical changes from partial left nephrectomy without findings of local recurrence or metastatic disease in the abdomen or pelvis

## 2022-05-17 NOTE — PATIENT INSTRUCTIONS
Taffy Goltz,    Today you underwent your surveillance cystoscopy  We have also sent a repeat cytology  Your CT scan looks quite good without any nodularity or regrowth in the area of your partial nephrectomy  If you have questions or concerns going forward, please let me know  We will be seeing you back in 3 months for surveillance cystoscopy  You will be due for repeat upper tract imaging in 12 months      Dr Charisse Crain

## 2022-05-19 ENCOUNTER — PROCEDURE VISIT (OUTPATIENT)
Dept: UROLOGY | Facility: CLINIC | Age: 57
End: 2022-05-19
Payer: COMMERCIAL

## 2022-05-19 VITALS
WEIGHT: 236 LBS | HEART RATE: 86 BPM | RESPIRATION RATE: 18 BRPM | SYSTOLIC BLOOD PRESSURE: 152 MMHG | BODY MASS INDEX: 33.04 KG/M2 | OXYGEN SATURATION: 98 % | DIASTOLIC BLOOD PRESSURE: 84 MMHG | HEIGHT: 71 IN

## 2022-05-19 DIAGNOSIS — C67.9 BLADDER CARCINOMA (HCC): Primary | ICD-10-CM

## 2022-05-19 DIAGNOSIS — Z71.2 ENCOUNTER TO DISCUSS TEST RESULTS: ICD-10-CM

## 2022-05-19 DIAGNOSIS — N40.1 BENIGN LOCALIZED PROSTATIC HYPERPLASIA WITH LOWER URINARY TRACT SYMPTOMS (LUTS): ICD-10-CM

## 2022-05-19 DIAGNOSIS — C64.9 PAPILLARY RENAL CELL CARCINOMA (HCC): ICD-10-CM

## 2022-05-19 PROCEDURE — 1036F TOBACCO NON-USER: CPT | Performed by: UROLOGY

## 2022-05-19 PROCEDURE — 52000 CYSTOURETHROSCOPY: CPT | Performed by: UROLOGY

## 2022-05-19 PROCEDURE — 99214 OFFICE O/P EST MOD 30 MIN: CPT | Performed by: UROLOGY

## 2022-05-19 PROCEDURE — 3008F BODY MASS INDEX DOCD: CPT | Performed by: UROLOGY

## 2022-05-19 NOTE — ASSESSMENT & PLAN NOTE
ECOG of 0, no new complaints, continues with some baseline anxiety and some stressors at home including some family members being ill and having dementia, test results reviewed with him in detail

## 2022-05-19 NOTE — PROGRESS NOTES
Office Cystoscopy Procedure Note    Indication:    Urothelial carcinoma of the bladder    Informed consent   The risks, benefits, complications, treatment options, and expected outcomes were discussed with the patient  The patient concurred with the proposed plan and provided informed consent  Anesthesia  Lidocaine jelly 2%    Antibiotic prophylaxis   None    Procedure  The patient was placed in the supineposition, was prepped and draped in the usual manner using sterile technique, and 2% lidocaine jelly instilled into the urethra  A 17 F flexible cystoscope was then inserted into the urethra and the urethra and bladder carefully examined  The following findings were noted:    Findings:  Urethra:  Normal  Prostate:  Some lateral lobe hypertrophy, no lesions  Bladder:  No recurrence, no CIS, no tumors  Ureteral orifices:  Orthotopic  Other findings:  None, retroflexed view confirms    Specimens: None                 Complications:    None; patient tolerated the procedure well           Disposition: To home           Condition: Stable    Plan: Negative surveillance cystoscopy, due for surveillance cystoscopy in 3 months as per the NCCN guidelines       Cystoscopy     Date/Time 5/19/2022 8:53 AM     Performed by  Makayla Wasserman MD     Authorized by Makayla Wasserman MD      Universal Protocol:  Consent: Verbal consent obtained  Written consent obtained  Risks and benefits: risks, benefits and alternatives were discussed  Consent given by: patient  Patient understanding: patient states understanding of the procedure being performed  Patient consent: the patient's understanding of the procedure matches consent given  Procedure consent: procedure consent matches procedure scheduled  Relevant documents: relevant documents present and verified  Test results: test results available and properly labeled  Site marked: the operative site was not marked  Radiology Images displayed and confirmed   If images not available, report reviewed: imaging studies available  Required items: required blood products, implants, devices, and special equipment available  Patient identity confirmed: verbally with patient        Procedure Details:  Procedure type: cystoscopy    Patient tolerance: Patient tolerated the procedure well with no immediate complications    Additional Procedure Details: Office Cystoscopy Procedure Note    Indication:    Urothelial carcinoma of the bladder    Informed consent   The risks, benefits, complications, treatment options, and expected outcomes were discussed with the patient  The patient concurred with the proposed plan and provided informed consent  Anesthesia  Lidocaine jelly 2%    Antibiotic prophylaxis   None    Procedure  The patient was placed in the supineposition, was prepped and draped in the usual manner using sterile technique, and 2% lidocaine jelly instilled into the urethra  A 17 F flexible cystoscope was then inserted into the urethra and the urethra and bladder carefully examined    The following findings were noted:    Findings:  Urethra:  Normal  Prostate:  Some lateral lobe hypertrophy, no lesions  Bladder:  No recurrence, no CIS, no tumors  Ureteral orifices:  Orthotopic  Other findings:  None, retroflexed view confirms    Specimens: None                 Complications:    None; patient tolerated the procedure well           Disposition: To home           Condition: Stable    Plan: Negative surveillance cystoscopy, due for surveillance cystoscopy in 3 months as per the NCCN guidelines

## 2022-05-19 NOTE — ASSESSMENT & PLAN NOTE
Voiding well at this time off of therapy, continue active surveillance of lower urinary tract symptoms

## 2022-05-19 NOTE — ASSESSMENT & PLAN NOTE
Status post partial nephrectomy, no evidence of recurrence in the area of partial nephrectomy    Will be due for repeat upper tract imaging in 1 year

## 2022-05-19 NOTE — LETTER
May 19, 2022     Amelie Gonzalez DO  [address] [place] [id number]    Patient: Nikolay Childers   YOB: 1965   Date of Visit: 5/19/2022       Dear Dr Ramez Beck: Thank you for referring Emily Zhang to me for evaluation  Below are my notes for this consultation  If you have questions, please do not hesitate to call me  I look forward to following your patient along with you  Sincerely,        Aldair Hines MD        CC: No Recipients  Aldair Hines MD  5/19/2022  8:54 AM  Sign when Signing Visit  Office Cystoscopy Procedure Note    Indication:    Urothelial carcinoma of the bladder    Informed consent   The risks, benefits, complications, treatment options, and expected outcomes were discussed with the patient  The patient concurred with the proposed plan and provided informed consent  Anesthesia  Lidocaine jelly 2%    Antibiotic prophylaxis   None    Procedure  The patient was placed in the supineposition, was prepped and draped in the usual manner using sterile technique, and 2% lidocaine jelly instilled into the urethra  A 17 F flexible cystoscope was then inserted into the urethra and the urethra and bladder carefully examined  The following findings were noted:    Findings:  Urethra:  Normal  Prostate:  Some lateral lobe hypertrophy, no lesions  Bladder:  No recurrence, no CIS, no tumors  Ureteral orifices:  Orthotopic  Other findings:  None, retroflexed view confirms    Specimens: None                 Complications:    None; patient tolerated the procedure well           Disposition: To home           Condition: Stable    Plan: Negative surveillance cystoscopy, due for surveillance cystoscopy in 3 months as per the NCCN guidelines       Cystoscopy     Date/Time 5/19/2022 8:53 AM     Performed by  Aldair Hines MD     Authorized by Aldair Hines MD      Universal Protocol:  Consent: Verbal consent obtained  Written consent obtained    Risks and benefits: risks, benefits and alternatives were discussed  Consent given by: patient  Patient understanding: patient states understanding of the procedure being performed  Patient consent: the patient's understanding of the procedure matches consent given  Procedure consent: procedure consent matches procedure scheduled  Relevant documents: relevant documents present and verified  Test results: test results available and properly labeled  Site marked: the operative site was not marked  Radiology Images displayed and confirmed  If images not available, report reviewed: imaging studies available  Required items: required blood products, implants, devices, and special equipment available  Patient identity confirmed: verbally with patient        Procedure Details:  Procedure type: cystoscopy    Patient tolerance: Patient tolerated the procedure well with no immediate complications    Additional Procedure Details: Office Cystoscopy Procedure Note    Indication:    Urothelial carcinoma of the bladder    Informed consent   The risks, benefits, complications, treatment options, and expected outcomes were discussed with the patient  The patient concurred with the proposed plan and provided informed consent  Anesthesia  Lidocaine jelly 2%    Antibiotic prophylaxis   None    Procedure  The patient was placed in the supineposition, was prepped and draped in the usual manner using sterile technique, and 2% lidocaine jelly instilled into the urethra  A 17 F flexible cystoscope was then inserted into the urethra and the urethra and bladder carefully examined  The following findings were noted:    Findings:  Urethra:  Normal  Prostate:  Some lateral lobe hypertrophy, no lesions  Bladder:  No recurrence, no CIS, no tumors  Ureteral orifices:  Orthotopic  Other findings:  None, retroflexed view confirms    Specimens: None                 Complications:    None; patient tolerated the procedure well           Disposition:  To home           Condition: Stable    Plan: Negative surveillance cystoscopy, due for surveillance cystoscopy in 3 months as per the NCCN guidelines              Cody Chan MD  5/19/2022  8:52 AM  Sign when Signing Visit       Problem List Items Addressed This Visit        Genitourinary    Bladder carcinoma ([facility] [address] ) - Primary     High risk bladder cancer status post BCG and multiple resections, no evidence of recurrence on cystoscopy today, urine cytology sent, if converged positive we will offer random bladder biopsies, in the absence of this we will continue surveillance as per the NCCN guidelines for high-risk bladder cancer           Relevant Orders    Cytology, urine    Papillary renal cell carcinoma ([facility] [address] )     Status post partial nephrectomy, no evidence of recurrence in the area of partial nephrectomy  Will be due for repeat upper tract imaging in 1 year           Benign localized prostatic hyperplasia with lower urinary tract symptoms (LUTS)     Voiding well at this time off of therapy, continue active surveillance of lower urinary tract symptoms              Other    Encounter to discuss test results     ECOG of 0, no new complaints, continues with some baseline anxiety and some stressors at home including some family members being ill and having dementia, test results reviewed with him in detail                       Assessment and plan:       Please see problem oriented charting for the assessment plan of today's urological complaints    Cody Chan MD      Chief Complaint     Chief Complaint   Patient presents with    Cystoscopy         History of Present Illness     Fely Adam is a 64 y o  man with high-grade urothelial carcinoma status post resection with BCG therapy, last surveillance cystoscopy was negative  He also underwent a robotic left partial nephrectomy  Surveillance imaging has been negative for repeat lesions or growth in the partial nephrectomy bed      His last urine cytology showed some atypical cells but no positive urothelial cells  A repeat cytology has been sent today  ECOG performance status is 0  New complaints today include none  The following portions of the patient's history were reviewed and updated as appropriate: allergies, current medications, past family history, past medical history, past social history, past surgical history and problem list         Detailed Urologic History     - please refer to HPI    Review of Systems     Review of Systems   Constitutional: Negative  HENT: Negative  Eyes: Negative  Respiratory: Negative  Cardiovascular: Negative  Gastrointestinal: Negative  Endocrine: Negative  Genitourinary: Negative  Musculoskeletal: Negative  Skin: Negative  Allergic/Immunologic: Negative  Neurological: Negative  Hematological: Negative  Psychiatric/Behavioral: The patient is nervous/anxious  Allergies     No Known Allergies    Physical Exam     Physical Exam  Vitals reviewed  Constitutional:       General: He is not in acute distress  Appearance: Normal appearance  He is obese  He is not ill-appearing or toxic-appearing  HENT:      Head: Normocephalic and atraumatic  Eyes:      General: No scleral icterus  Right eye: No discharge  Left eye: No discharge  Cardiovascular:      Pulses: Normal pulses  Pulmonary:      Effort: Pulmonary effort is normal    Abdominal:      General: There is no distension  Palpations: There is no mass  Tenderness: There is no abdominal tenderness  Hernia: No hernia is present  Genitourinary:     Penis: Normal     Musculoskeletal:         General: No swelling  Skin:     General: Skin is warm  Neurological:      General: No focal deficit present  Mental Status: He is alert and oriented to person, place, and time  Cranial Nerves: No cranial nerve deficit     Psychiatric:         Mood and Affect: Mood normal          Behavior: Behavior normal          Thought Content: Thought content normal          Judgment: Judgment normal              Vital Signs  Vitals:    05/19/22 0824   BP: 152/84   BP Location: Left arm   Patient Position: Sitting   Cuff Size: Adult   Pulse: 86   Resp: 18   SpO2: 98%   Weight: 107 kg (236 lb)   Height: 5' 11" (1 803 m)         Current Medications       Current Outpatient Medications:     losartan (COZAAR) 25 mg tablet, Take 25 mg by mouth every morning, Disp: , Rfl:     rosuvastatin (CRESTOR) 20 MG tablet, Take 20 mg by mouth every morning, Disp: , Rfl:       Active Problems     Patient Active Problem List   Diagnosis    Actinic keratoses    Bladder carcinoma (HCC)    Fatty liver    Abdominal hernia    High triglycerides    Hyperlipidemia LDL goal <160    Seborrheic keratosis    Screening for cardiovascular, respiratory, and genitourinary diseases    Screening for diabetes mellitus (DM)    Prostate cancer screening    Prediabetes    Healthcare maintenance    [place] cardiac risk 10-20% in next 10 years    Obesity (BMI 30-39  9)    Thyroid nodule    Eczema    Venous anomaly    Essential hypertension    Family history of coronary arteriosclerosis    Rash    Papillary renal cell carcinoma ([facility] [address] )    Encounter to discuss test results    Benign localized prostatic hyperplasia with lower urinary tract symptoms (LUTS)    Hypertensive heart disease without congestive heart failure    Left ventricular hypertrophy    Calcification of coronary artery    Acute non-recurrent maxillary sinusitis         Past Medical History     History reviewed  No pertinent past medical history        Surgical History     Past Surgical History:   Procedure Laterality Date    CHOLECYSTECTOMY      HERNIA REPAIR      bilateral linguinal mesh    NASAL SEPTUM SURGERY      NEUROPLASTY / TRANSPOSITION ULNAR NERVE AT ELBOW Left     [place] COLONOSCOPY FLX DX W/COLLJ SPEC WHEN PFRMD N/A 3/11/2016    Procedure: COLONOSCOPY; Surgeon: Juan Jose Fernandez MD;  Location: [facility]; Service: Gastroenterology         Family History     History reviewed  No pertinent family history  Social History     Social History     Social History     Tobacco Use   Smoking Status Former Smoker    Packs/day: 2 00    Years: 40 00    Pack years: 80 00    Quit date: 2016    Years since quittin 0   Smokeless Tobacco Never Used         Pertinent Lab Values     Lab Results   Component Value Date    CREATININE 1 11 2022       Lab Results   Component Value Date    PSA 1 7 2021    PSA 2 6 2020    PSA 2 2 2019             Pertinent Imaging      IMPRESSION:     Postsurgical changes from partial left nephrectomy without findings of local recurrence or metastatic disease in the abdomen or pelvis

## 2022-05-19 NOTE — ASSESSMENT & PLAN NOTE
High risk bladder cancer status post BCG and multiple resections, no evidence of recurrence on cystoscopy today, urine cytology sent, if converged positive we will offer random bladder biopsies, in the absence of this we will continue surveillance as per the NCCN guidelines for high-risk bladder cancer

## 2022-08-31 NOTE — PROGRESS NOTES
Problem List Items Addressed This Visit        Genitourinary    Bladder carcinoma ([facility] [address] )    Papillary renal cell carcinoma (HCC)    Benign localized prostatic hyperplasia with lower urinary tract symptoms (LUTS) - Primary    Relevant Orders    Cytology, urine       Other    Encounter to discuss test results    Lesion of bladder    Relevant Orders    Case request operating room: TRANSURETHRAL RESECTION OF BLADDER TUMOR (TURBT), CYSTOSCOPY WITH RETROGRADE PYELOGRAM (Completed)            Discussion:    Nita Salazar is doing well  ECOG is 0  No urologic complaints today, no flank pain  He has known bladder cancer status post intravesical therapies and resections, he has a small area of what appears to be a potential recurrence at the left base of the bladder, this is a flat lesion measuring 4 millimeters  I do recommend transurethral section of bladder tumor with bilateral retrograde pyelography and instillation of gemcitabine at that time  With regard to papillary renal cell carcinoma he is status post a partial nephrectomy, not due for repeat imaging until next year given clean scans recently  All questions and concerns answered addressed  He will return for cystoscopy with transurethral section of bladder tumor with bilateral retrograde pyelography and gemcitabine instillation, 2000 milligrams in 100 milliliters    Assessment and plan:       Please see problem oriented charting for the assessment plan of today's urological complaints      Adi Delvalle MD      Chief Complaint     No chief complaint on file  History of Present Illness     Maryjane Pretty is a 64 y o  patient with high-grade urothelial carcinoma status post BCG with recurrence is and subsequent resection  He had been having negative surveillance cystoscopy, he does have a small area of concern for recurrence on his cystoscopy today and I have recommended TURBT with retrograde pyelography and gemcitabine      He has no evidence of recurrence of his papillary renal cell carcinoma  His anxiety is somewhat better recently although he has been dealing with the issues with his father in law with anxiety, other family members have recently undergone surgery, this has been a higher source of stress for him  Despite this his ECOG is 0, accomplishing all activities of daily living at the current time  No other urologic complaints today  The following portions of the patient's history were reviewed and updated as appropriate: allergies, current medications, past family history, past medical history, past social history, past surgical history and problem list     Detailed Urologic History     - please refer to HPI    Review of Systems     Review of Systems   Constitutional: Negative  HENT: Negative  Eyes: Negative  Respiratory: Negative  Cardiovascular: Negative  Gastrointestinal: Negative  Endocrine: Negative  Genitourinary: Negative  Musculoskeletal: Negative  Skin: Negative  Allergic/Immunologic: Negative  Neurological: Negative  Hematological: Negative  Psychiatric/Behavioral: The patient is nervous/anxious  Allergies     No Known Allergies    Physical Exam     Physical Exam  Vitals reviewed  Constitutional:       General: He is not in acute distress  Appearance: Normal appearance  He is not ill-appearing, toxic-appearing or diaphoretic  HENT:      Head: Normocephalic and atraumatic  Eyes:      General: No scleral icterus  Right eye: No discharge  Left eye: No discharge  Cardiovascular:      Pulses: Normal pulses  Pulmonary:      Effort: Pulmonary effort is normal    Abdominal:      General: There is no distension  Palpations: There is no mass  Genitourinary:     Penis: Normal     Musculoskeletal:         General: No swelling  Skin:     General: Skin is warm  Neurological:      General: No focal deficit present        Mental Status: He is alert and oriented to person, place, and time  Cranial Nerves: No cranial nerve deficit  Psychiatric:         Mood and Affect: Mood normal          Behavior: Behavior normal          Thought Content: Thought content normal          Judgment: Judgment normal              Vital Signs  There were no vitals filed for this visit  Current Medications       Current Outpatient Medications:     losartan (COZAAR) 25 mg tablet, Take 25 mg by mouth every morning, Disp: , Rfl:     rosuvastatin (CRESTOR) 20 MG tablet, Take 20 mg by mouth every morning, Disp: , Rfl:       Active Problems     Patient Active Problem List   Diagnosis    Actinic keratoses    Bladder carcinoma (HCC)    Fatty liver    Abdominal hernia    High triglycerides    Hyperlipidemia LDL goal <160    Seborrheic keratosis    Screening for cardiovascular, respiratory, and genitourinary diseases    Screening for diabetes mellitus (DM)    Prostate cancer screening    Prediabetes    Healthcare maintenance    [place] cardiac risk 10-20% in next 10 years    Obesity (BMI 30-39  9)    Thyroid nodule    Eczema    Venous anomaly    Essential hypertension    Family history of coronary arteriosclerosis    Rash    Papillary renal cell carcinoma ([facility] [address] )    Encounter to discuss test results    Benign localized prostatic hyperplasia with lower urinary tract symptoms (LUTS)    Hypertensive heart disease without congestive heart failure    Left ventricular hypertrophy    Calcification of coronary artery    Acute non-recurrent maxillary sinusitis         Past Medical History     No past medical history on file        Surgical History     Past Surgical History:   Procedure Laterality Date    CHOLECYSTECTOMY      HERNIA REPAIR      bilateral linguinal mesh    NASAL SEPTUM SURGERY      NEUROPLASTY / TRANSPOSITION ULNAR NERVE AT ELBOW Left     MD COLONOSCOPY FLX DX W/COLLJ SPEC WHEN PFRMD N/A 3/11/2016    Procedure: COLONOSCOPY;  Surgeon: Juan Ramos Poncho Wisdom MD;  Location: [place] [address] GI LAB; Service: Gastroenterology         Family History     No family history on file        Social History     Social History     Social History     Tobacco Use   Smoking Status Former Smoker    Packs/day: 2 00    Years: 40 00    Pack years: 80 00    Quit date: 2016    Years since quittin 3   Smokeless Tobacco Never Used         Pertinent Lab Values     Lab Results   Component Value Date    CREATININE 1 11 2022       Lab Results   Component Value Date    PSA 1 7 2021    PSA 2 6 2020    PSA 2 2 2019             Pertinent Imaging      No new upper tract imaging for my review

## 2022-09-02 ENCOUNTER — PROCEDURE VISIT (OUTPATIENT)
Dept: UROLOGY | Facility: CLINIC | Age: 57
End: 2022-09-02
Payer: COMMERCIAL

## 2022-09-02 VITALS
HEART RATE: 102 BPM | SYSTOLIC BLOOD PRESSURE: 172 MMHG | WEIGHT: 256.4 LBS | BODY MASS INDEX: 35.9 KG/M2 | HEIGHT: 71 IN | DIASTOLIC BLOOD PRESSURE: 88 MMHG | RESPIRATION RATE: 16 BRPM | OXYGEN SATURATION: 97 %

## 2022-09-02 DIAGNOSIS — C64.9 PAPILLARY RENAL CELL CARCINOMA (HCC): ICD-10-CM

## 2022-09-02 DIAGNOSIS — C67.9 BLADDER CARCINOMA (HCC): ICD-10-CM

## 2022-09-02 DIAGNOSIS — N40.1 BENIGN LOCALIZED PROSTATIC HYPERPLASIA WITH LOWER URINARY TRACT SYMPTOMS (LUTS): Primary | ICD-10-CM

## 2022-09-02 DIAGNOSIS — Z71.2 ENCOUNTER TO DISCUSS TEST RESULTS: ICD-10-CM

## 2022-09-02 DIAGNOSIS — N32.9 LESION OF BLADDER: ICD-10-CM

## 2022-09-02 PROCEDURE — 88112 CYTOPATH CELL ENHANCE TECH: CPT | Performed by: PATHOLOGY

## 2022-09-02 PROCEDURE — 52000 CYSTOURETHROSCOPY: CPT | Performed by: UROLOGY

## 2022-09-02 PROCEDURE — 99214 OFFICE O/P EST MOD 30 MIN: CPT | Performed by: UROLOGY

## 2022-09-02 RX ORDER — GABAPENTIN 100 MG/1
300 CAPSULE ORAL ONCE
OUTPATIENT
Start: 2022-09-02 | End: 2022-09-02

## 2022-09-02 RX ORDER — LOSARTAN POTASSIUM 50 MG/1
50 TABLET ORAL DAILY
COMMUNITY
Start: 2022-08-18

## 2022-09-02 RX ORDER — ACETAMINOPHEN 325 MG/1
975 TABLET ORAL ONCE
OUTPATIENT
Start: 2022-09-02 | End: 2022-09-02

## 2022-09-02 NOTE — PROGRESS NOTES
Office Cystoscopy Procedure Note    Indication:    Urothelial carcinoma of the bladder    Informed consent   The risks, benefits, complications, treatment options, and expected outcomes were discussed with the patient  The patient concurred with the proposed plan and provided informed consent  Anesthesia  Lidocaine jelly 2%    Antibiotic prophylaxis   None    Procedure  The patient was placed in the supineposition, was prepped and draped in the usual manner using sterile technique, and 2% lidocaine jelly instilled into the urethra  A 17 F flexible cystoscope was then inserted into the urethra and the urethra and bladder carefully examined  The following findings were noted:    Findings:  Urethra:  Normal  Prostate:  Some lateral lobe hypertrophy  Bladder:  Small area of concern for recurrence measuring 4-5 millimeters at the left base of bladder  Ureteral orifices:  Orthotopic  Other findings:  None, retroflexed view confirms    Specimens: None                 Complications:    None; patient tolerated the procedure well           Disposition: To home            Condition: Stable    Plan: Small area with concern for recurrence of bladder cancer, will plan for TURBT and bilateral retrograde pyelography and gemcitabine instillation       Cystoscopy     Date/Time 9/2/2022 10:15 AM     Performed by  Chelo Stoddard MD     Authorized by Chelo Stoddard MD      Universal Protocol:  Consent: Verbal consent obtained  Written consent obtained    Risks and benefits: risks, benefits and alternatives were discussed  Consent given by: patient  Patient understanding: patient states understanding of the procedure being performed  Patient consent: the patient's understanding of the procedure matches consent given  Procedure consent: procedure consent matches procedure scheduled  Relevant documents: relevant documents present and verified  Test results: test results available and properly labeled  Site marked: the operative site was not marked  Radiology Images displayed and confirmed  If images not available, report reviewed: imaging studies available  Required items: required blood products, implants, devices, and special equipment available  Patient identity confirmed: verbally with patient and provided demographic data        Procedure Details:  Procedure type: cystoscopy    Patient tolerance: Patient tolerated the procedure well with no immediate complications    Additional Procedure Details: Office Cystoscopy Procedure Note    Indication:    Urothelial carcinoma of the bladder    Informed consent   The risks, benefits, complications, treatment options, and expected outcomes were discussed with the patient  The patient concurred with the proposed plan and provided informed consent  Anesthesia  Lidocaine jelly 2%    Antibiotic prophylaxis   None    Procedure  The patient was placed in the supineposition, was prepped and draped in the usual manner using sterile technique, and 2% lidocaine jelly instilled into the urethra  A 17 F flexible cystoscope was then inserted into the urethra and the urethra and bladder carefully examined    The following findings were noted:    Findings:  Urethra:  Normal  Prostate:  Some lateral lobe hypertrophy  Bladder:  Small area of concern for recurrence measuring 4-5 millimeters at the left base of bladder  Ureteral orifices:  Orthotopic  Other findings:  None, retroflexed view confirms    Specimens: None                 Complications:    None; patient tolerated the procedure well           Disposition: To home            Condition: Stable    Plan: Small area with concern for recurrence of bladder cancer, will plan for TURBT and bilateral retrograde pyelography and gemcitabine instillation

## 2022-09-08 ENCOUNTER — TELEPHONE (OUTPATIENT)
Dept: UROLOGY | Facility: MEDICAL CENTER | Age: 57
End: 2022-09-08

## 2022-09-08 NOTE — TELEPHONE ENCOUNTER
I spoke to the patient and scheduled his TURBT w/[facility] and RTG for 10/28/2022 at [address] [address] with Dr Le Acosta     -instructions given verbally and mailed  -C Urine C&S  3 weeks prior  -patient will avoid any potentially blood thinning medications 7 days prior  -[facility] - on [facility] tracker 9/8/2022

## 2022-10-07 ENCOUNTER — TELEPHONE (OUTPATIENT)
Dept: UROLOGY | Facility: MEDICAL CENTER | Age: 57
End: 2022-10-07

## 2022-10-12 PROBLEM — J01.00 ACUTE NON-RECURRENT MAXILLARY SINUSITIS: Status: RESOLVED | Noted: 2022-01-10 | Resolved: 2022-10-12

## 2022-10-13 NOTE — TELEPHONE ENCOUNTER
Pt called and stated he did not go to have urine culture but will go tomorrow morning   Pt requesting a c/b to make sure this is enough time for procedure for urine culture     Pt call [phone]

## 2022-10-14 ENCOUNTER — ANESTHESIA EVENT (OUTPATIENT)
Dept: PERIOP | Facility: HOSPITAL | Age: 57
End: 2022-10-14

## 2022-10-21 NOTE — TELEPHONE ENCOUNTER
I spoke to Kamilah Sahni, he has a $6,000 00 out of pocket cost if he has surgery in [place] due to his [employer]  Patient is aware that we do have a provider thet operates at [facility] but the 1st available will be 11/16/2022 or 12/20/2022 and he would likely need a office visit with Dr Griffin Current  Patient would liek to think about this over the weekend and is also asking if Dr Amanda Guzman feels the wait is to long

## 2022-10-21 NOTE — TELEPHONE ENCOUNTER
Pt called and stated that he is having problem with the insurance and the cost out of pocket for the procedure to be done in [place]       Pt can be reached at [phone]

## 2022-10-25 NOTE — H&P
H&P Exam - Urology   Maura Randhawa 64 y o  male MRN: [id number]  Unit/Bed#:  Encounter: [id number]    Assessment/Plan     Assessment:  70-year-old gentleman with known history of bladder cancer, recent cystoscopy showed a area for concern and a bladder lesion at the left base the bladder  Here today for cystoscopy with transurethral section of bladder tumor with possible bilateral retrograde pyelography, possible chemotherapy instillation, and all indicated procedures  Ready for surgery today  Requires no marking  Plan:  Proceed to TURBT with bilateral retrograde pyelography with possible chemotherapy installation with all indicated procedures    History of Present Illness   HPI:  Maura Randhawa is a 64 y o  male who presents with known bladder cancer and kidney cancer, recent surveillance cystoscopy showed a bladder lesion at the left base the bladder  Here for removal there of  Changes in his state of health since the last time he was seen include ***  The following portions of the patient's history were reviewed and updated as appropriate: allergies, current medications, past family history, past medical history, past social history, past surgical history and problem list     Review of Systems    Historical Information   No past medical history on file  Past Surgical History:   Procedure Laterality Date   • CHOLECYSTECTOMY     • HERNIA REPAIR      bilateral linguinal mesh   • NASAL SEPTUM SURGERY     • NEUROPLASTY / TRANSPOSITION ULNAR NERVE AT ELBOW Left    • MO COLONOSCOPY FLX DX W/COLLJ SPEC WHEN PFRMD N/A 3/11/2016    Procedure: COLONOSCOPY;  Surgeon: Gray Oleary MD;  [facility];   Service: Gastroenterology     Social History   Social History     Substance and Sexual Activity   Alcohol Use Yes    Comment: rare     Social History     Substance and Sexual Activity   Drug Use No     Social History     Tobacco Use   Smoking Status Former Smoker   • Packs/day: 2 00   • Years: 40 00   • Pack years: 80 00   • Quit date: 2016   • Years since quittin 5   Smokeless Tobacco Never Used     E-Cigarette/Vaping   • E-Cigarette Use Never User      E-Cigarette/Vaping Substances     Family History: No family history on file  Meds/Allergies   PTA meds:   Cannot display prior to admission medications because the patient has not been admitted in this contact  No Known Allergies    Objective   Vitals: There were no vitals taken for this visit  No intake/output data recorded  Invasive Devices  Report    None                 Physical Exam    Lab Results: I have personally reviewed pertinent reports  Imaging: I have personally reviewed pertinent reports  EKG, Pathology, and Other Studies: I have personally reviewed pertinent reports  VTE Prophylaxis: Sequential compression device Efraín Ye)     Code Status: No Order  Advance Directive and Living Will:      Power of :    POLST:      Counseling / Coordination of Care  Total floor / unit time spent today 15 minutes  Greater than 50% of total time was spent with the patient and / or family counseling and / or coordination of care  A description of the counseling / coordination of care: review of today's case

## 2022-10-28 ENCOUNTER — ANESTHESIA (OUTPATIENT)
Dept: PERIOP | Facility: HOSPITAL | Age: 57
End: 2022-10-28

## 2022-10-28 LAB
FLUAV RNA RESP QL NAA+PROBE: NEGATIVE
FLUBV RNA RESP QL NAA+PROBE: NEGATIVE
SARS-COV-2 RNA RESP QL NAA+PROBE: NEGATIVE

## 2022-10-31 NOTE — PRE-PROCEDURE INSTRUCTIONS
My Surgical Experience    The following information was developed to assist you to prepare for your operation  What do I need to do before coming to the hospital?  • Arrange for a responsible person to drive you to and from the hospital   • Arrange care for your children at home  Children are not allowed in the recovery areas of the hospital  • Plan to wear clothing that is easy to put on and take off  If you are having shoulder surgery, wear a shirt that buttons or zippers in the front  Bathing  o Shower the evening before and the morning of your surgery with an antibacterial soap  Please refer to the Pre Op Showering Instructions for Surgery Patients Sheet   o Remove nail polish and all body piercing jewelry  o Do not shave any body part for at least 24 hours before surgery-this includes face, arms, legs and upper body  Food  o Nothing to eat or drink after midnight the night before your surgery  This includes candy and chewing gum  o Exception: If your surgery is after 12:00pm (noon), you may have clear liquids such as 7-Up®, ginger ale, apple or cranberry juice, Jell-O®, water, or clear broth until 8:00 am  o Do not drink milk or juice with pulp on the morning before surgery  o Do not drink alcohol 24 hours before surgery  Medicine  o Follow instructions you received from your surgeon about which medicines you may take on the day of surgery  o If instructed to take medicine on the morning of surgery, take pills with just a small sip of water  Call your prescribing doctor for specific infroamtion on what to do if you take insulin    What should I bring to the hospital?    Bring:  • Crutches or a walker, if you have them, for foot or knee surgery  • A list of the daily medicines, vitamins, minerals, herbals and nutritional supplements you take   Include the dosages of medicines and the time you take them each day  • Glasses, dentures or hearing aids  • Minimal clothing; you will be wearing hospital sleepwear  • Photo ID; required to verify your identity  • If you have a Living Will or Power of , bring a copy of the documents  • If you have an ostomy, bring an extra pouch and any supplies you use    Do not bring  • Medicines or inhalers  • Money, valuables or jewelry    What other information should I know about the day of surgery? • Notify your surgeons if you develop a cold, sore throat, cough, fever, rash or any other illness  • Report to the Ambulatory Surgical/Same Day Surgery Unit  • You will be instructed to stop at Registration only if you have not been pre-registered  • Inform your  fi they do not stay that they will be asked by the staff to leave a phone number where they can be reached  • Be available to be reached before surgery  In the event the operating room schedule changes, you may be asked to come in earlier or later than expected    *It is important to tell your doctor and others involved in your health care if you are taking or have been taking any non-prescription drugs, vitamins, minerals, herbals or other nutritional supplements  Any of these may interact with some food or medicines and cause a reaction      Pre-Surgery Instructions:   Medication Instructions   • losartan (COZAAR) 50 mg tablet Hold day of surgery  • rosuvastatin (CRESTOR) 20 MG tablet Hold day of surgery

## 2022-11-02 ENCOUNTER — APPOINTMENT (OUTPATIENT)
Dept: RADIOLOGY | Facility: HOSPITAL | Age: 57
End: 2022-11-02

## 2022-11-02 ENCOUNTER — HOSPITAL ENCOUNTER (OUTPATIENT)
Facility: HOSPITAL | Age: 57
Setting detail: OUTPATIENT SURGERY
Discharge: HOME | End: 2022-11-02
Attending: STUDENT IN AN ORGANIZED HEALTH CARE EDUCATION/TRAINING PROGRAM | Admitting: STUDENT IN AN ORGANIZED HEALTH CARE EDUCATION/TRAINING PROGRAM

## 2022-11-02 ENCOUNTER — TELEPHONE (OUTPATIENT)
Dept: OTHER | Facility: HOSPITAL | Age: 57
End: 2022-11-02

## 2022-11-02 VITALS
RESPIRATION RATE: 16 BRPM | TEMPERATURE: 97.5 F | WEIGHT: 243 LBS | BODY MASS INDEX: 34.02 KG/M2 | HEIGHT: 71 IN | SYSTOLIC BLOOD PRESSURE: 161 MMHG | OXYGEN SATURATION: 95 % | HEART RATE: 82 BPM | DIASTOLIC BLOOD PRESSURE: 92 MMHG

## 2022-11-02 DIAGNOSIS — N32.9 DISEASE OF BLADDER: ICD-10-CM

## 2022-11-02 DIAGNOSIS — N32.9 LESION OF BLADDER: ICD-10-CM

## 2022-11-02 LAB
ANION GAP SERPL CALCULATED.3IONS-SCNC: 7 MMOL/L (ref 4–13)
ATRIAL RATE: 70 BPM
BASOPHILS # BLD AUTO: 0.05 THOUSANDS/ÂΜL (ref 0–0.1)
BASOPHILS NFR BLD AUTO: 1 % (ref 0–1)
BUN SERPL-MCNC: 16 MG/DL (ref 5–25)
CALCIUM SERPL-MCNC: 8.6 MG/DL (ref 8.3–10.1)
CHLORIDE SERPL-SCNC: 108 MMOL/L (ref 96–108)
CO2 SERPL-SCNC: 27 MMOL/L (ref 21–32)
CREAT SERPL-MCNC: 1.18 MG/DL (ref 0.6–1.3)
EOSINOPHIL # BLD AUTO: 0.13 THOUSAND/ÂΜL (ref 0–0.61)
EOSINOPHIL NFR BLD AUTO: 2 % (ref 0–6)
ERYTHROCYTE [DISTWIDTH] IN BLOOD BY AUTOMATED COUNT: 12.9 % (ref 11.6–15.1)
GFR SERPL CREATININE-BSD FRML MDRD: 68 ML/MIN/1.73SQ M
GLUCOSE P FAST SERPL-MCNC: 105 MG/DL (ref 65–99)
GLUCOSE SERPL-MCNC: 105 MG/DL (ref 65–140)
HCT VFR BLD AUTO: 46 % (ref 36.5–49.3)
HGB BLD-MCNC: 15 G/DL (ref 12–17)
IMM GRANULOCYTES # BLD AUTO: 0.02 THOUSAND/UL (ref 0–0.2)
IMM GRANULOCYTES NFR BLD AUTO: 0 % (ref 0–2)
LYMPHOCYTES # BLD AUTO: 1.71 THOUSANDS/ÂΜL (ref 0.6–4.47)
LYMPHOCYTES NFR BLD AUTO: 24 % (ref 14–44)
MCH RBC QN AUTO: 28.7 PG (ref 26.8–34.3)
MCHC RBC AUTO-ENTMCNC: 32.6 G/DL (ref 31.4–37.4)
MCV RBC AUTO: 88 FL (ref 82–98)
MONOCYTES # BLD AUTO: 0.6 THOUSAND/ÂΜL (ref 0.17–1.22)
MONOCYTES NFR BLD AUTO: 9 % (ref 4–12)
NEUTROPHILS # BLD AUTO: 4.51 THOUSANDS/ÂΜL (ref 1.85–7.62)
NEUTS SEG NFR BLD AUTO: 64 % (ref 43–75)
NRBC BLD AUTO-RTO: 0 /100 WBCS
P AXIS: 56 DEGREES
PLATELET # BLD AUTO: 227 THOUSANDS/UL (ref 149–390)
PMV BLD AUTO: 10.3 FL (ref 8.9–12.7)
POTASSIUM SERPL-SCNC: 4.2 MMOL/L (ref 3.5–5.3)
PR INTERVAL: 188 MS
QRS AXIS: 13 DEGREES
QRSD INTERVAL: 88 MS
QT INTERVAL: 424 MS
QTC INTERVAL: 457 MS
RBC # BLD AUTO: 5.23 MILLION/UL (ref 3.88–5.62)
SODIUM SERPL-SCNC: 142 MMOL/L (ref 135–147)
T WAVE AXIS: -71 DEGREES
VENTRICULAR RATE: 70 BPM
WBC # BLD AUTO: 7.02 THOUSAND/UL (ref 4.31–10.16)

## 2022-11-02 RX ORDER — FENTANYL CITRATE/PF 50 MCG/ML
50 SYRINGE (ML) INJECTION
Status: DISCONTINUED | OUTPATIENT
Start: 2022-11-02 | End: 2022-11-02 | Stop reason: HOSPADM

## 2022-11-02 RX ORDER — LIDOCAINE HYDROCHLORIDE 10 MG/ML
INJECTION, SOLUTION EPIDURAL; INFILTRATION; INTRACAUDAL; PERINEURAL AS NEEDED
Status: DISCONTINUED | OUTPATIENT
Start: 2022-11-02 | End: 2022-11-02

## 2022-11-02 RX ORDER — CEFAZOLIN SODIUM 2 G/50ML
SOLUTION INTRAVENOUS AS NEEDED
Status: DISCONTINUED | OUTPATIENT
Start: 2022-11-02 | End: 2022-11-02

## 2022-11-02 RX ORDER — ONDANSETRON 2 MG/ML
4 INJECTION INTRAMUSCULAR; INTRAVENOUS ONCE AS NEEDED
Status: DISCONTINUED | OUTPATIENT
Start: 2022-11-02 | End: 2022-11-02 | Stop reason: HOSPADM

## 2022-11-02 RX ORDER — GABAPENTIN 300 MG/1
300 CAPSULE ORAL ONCE
Status: COMPLETED | OUTPATIENT
Start: 2022-11-02 | End: 2022-11-02

## 2022-11-02 RX ORDER — MIDAZOLAM HYDROCHLORIDE 2 MG/2ML
INJECTION, SOLUTION INTRAMUSCULAR; INTRAVENOUS AS NEEDED
Status: DISCONTINUED | OUTPATIENT
Start: 2022-11-02 | End: 2022-11-02

## 2022-11-02 RX ORDER — PROPOFOL 10 MG/ML
INJECTION, EMULSION INTRAVENOUS AS NEEDED
Status: DISCONTINUED | OUTPATIENT
Start: 2022-11-02 | End: 2022-11-02

## 2022-11-02 RX ORDER — SODIUM CHLORIDE, SODIUM LACTATE, POTASSIUM CHLORIDE, CALCIUM CHLORIDE 600; 310; 30; 20 MG/100ML; MG/100ML; MG/100ML; MG/100ML
125 INJECTION, SOLUTION INTRAVENOUS CONTINUOUS
Status: DISCONTINUED | OUTPATIENT
Start: 2022-11-02 | End: 2022-11-02 | Stop reason: HOSPADM

## 2022-11-02 RX ORDER — MAGNESIUM HYDROXIDE 1200 MG/15ML
LIQUID ORAL AS NEEDED
Status: DISCONTINUED | OUTPATIENT
Start: 2022-11-02 | End: 2022-11-02 | Stop reason: HOSPADM

## 2022-11-02 RX ORDER — ONDANSETRON 2 MG/ML
INJECTION INTRAMUSCULAR; INTRAVENOUS AS NEEDED
Status: DISCONTINUED | OUTPATIENT
Start: 2022-11-02 | End: 2022-11-02

## 2022-11-02 RX ORDER — SODIUM CHLORIDE, SODIUM LACTATE, POTASSIUM CHLORIDE, CALCIUM CHLORIDE 600; 310; 30; 20 MG/100ML; MG/100ML; MG/100ML; MG/100ML
100 INJECTION, SOLUTION INTRAVENOUS CONTINUOUS
Status: DISCONTINUED | OUTPATIENT
Start: 2022-11-02 | End: 2022-11-02 | Stop reason: HOSPADM

## 2022-11-02 RX ORDER — CEFAZOLIN SODIUM 2 G/50ML
2000 SOLUTION INTRAVENOUS ONCE
Status: DISCONTINUED | OUTPATIENT
Start: 2022-11-02 | End: 2022-11-02 | Stop reason: HOSPADM

## 2022-11-02 RX ORDER — ACETAMINOPHEN 325 MG/1
975 TABLET ORAL ONCE
Status: COMPLETED | OUTPATIENT
Start: 2022-11-02 | End: 2022-11-02

## 2022-11-02 RX ORDER — DEXAMETHASONE SODIUM PHOSPHATE 10 MG/ML
INJECTION, SOLUTION INTRAMUSCULAR; INTRAVENOUS AS NEEDED
Status: DISCONTINUED | OUTPATIENT
Start: 2022-11-02 | End: 2022-11-02

## 2022-11-02 RX ORDER — FENTANYL CITRATE 50 UG/ML
INJECTION, SOLUTION INTRAMUSCULAR; INTRAVENOUS AS NEEDED
Status: DISCONTINUED | OUTPATIENT
Start: 2022-11-02 | End: 2022-11-02

## 2022-11-02 RX ADMIN — DEXAMETHASONE SODIUM PHOSPHATE 4 MG: 10 INJECTION, SOLUTION INTRAMUSCULAR; INTRAVENOUS at 13:21

## 2022-11-02 RX ADMIN — PROPOFOL 200 MG: 10 INJECTION, EMULSION INTRAVENOUS at 13:17

## 2022-11-02 RX ADMIN — LIDOCAINE HYDROCHLORIDE 50 MG: 10 INJECTION, SOLUTION EPIDURAL; INFILTRATION; INTRACAUDAL; PERINEURAL at 13:17

## 2022-11-02 RX ADMIN — FENTANYL CITRATE 50 MCG: 50 INJECTION INTRAMUSCULAR; INTRAVENOUS at 15:20

## 2022-11-02 RX ADMIN — GABAPENTIN 300 MG: 300 CAPSULE ORAL at 10:43

## 2022-11-02 RX ADMIN — FENTANYL CITRATE 25 MCG: 50 INJECTION, SOLUTION INTRAMUSCULAR; INTRAVENOUS at 13:39

## 2022-11-02 RX ADMIN — ONDANSETRON 4 MG: 2 INJECTION INTRAMUSCULAR; INTRAVENOUS at 13:21

## 2022-11-02 RX ADMIN — FENTANYL CITRATE 25 MCG: 50 INJECTION, SOLUTION INTRAMUSCULAR; INTRAVENOUS at 13:33

## 2022-11-02 RX ADMIN — CEFAZOLIN SODIUM 2000 MG: 2 SOLUTION INTRAVENOUS at 13:19

## 2022-11-02 RX ADMIN — ACETAMINOPHEN 975 MG: 325 TABLET, FILM COATED ORAL at 10:43

## 2022-11-02 RX ADMIN — MIDAZOLAM 2 MG: 1 INJECTION INTRAMUSCULAR; INTRAVENOUS at 13:08

## 2022-11-02 RX ADMIN — FENTANYL CITRATE 25 MCG: 50 INJECTION, SOLUTION INTRAMUSCULAR; INTRAVENOUS at 13:17

## 2022-11-02 RX ADMIN — FENTANYL CITRATE 25 MCG: 50 INJECTION, SOLUTION INTRAMUSCULAR; INTRAVENOUS at 14:05

## 2022-11-02 RX ADMIN — SODIUM CHLORIDE, SODIUM LACTATE, POTASSIUM CHLORIDE, AND CALCIUM CHLORIDE: .6; .31; .03; .02 INJECTION, SOLUTION INTRAVENOUS at 12:37

## 2022-11-02 NOTE — ANESTHESIA PREPROCEDURE EVALUATION
Procedure:  TRANSURETHRAL RESECTION OF BLADDER TUMOR (TURBT), INSTILLATION OF GEMCITABINE (N/A Bladder)  CYSTOSCOPY WITH RETROGRADE PYELOGRAM (Bilateral Bladder)    Relevant Problems   ANESTHESIA (within normal limits)      CARDIO   (+) Essential hypertension   (+) High triglycerides   (+) Hyperlipidemia LDL goal <160      GI/HEPATIC   (+) Fatty liver      /RENAL   (+) Benign localized prostatic hyperplasia with lower urinary tract symptoms (LUTS)   (+) Papillary renal cell carcinoma (HCC)      PULMONARY (within normal limits)        Physical Exam    Airway    Mallampati score: II  TM Distance: >3 FB  Neck ROM: full     Dental   No notable dental hx     Cardiovascular  Rhythm: regular, Rate: normal,     Pulmonary  Breath sounds clear to auscultation,     Other Findings        Anesthesia Plan  ASA Score- 2     Anesthesia Type- general with ASA Monitors  Additional Monitors:   Airway Plan:           Plan Factors-Exercise tolerance (METS): >4 METS  Chart reviewed  EKG reviewed  Existing labs reviewed  Patient summary reviewed  Obstructive sleep apnea risk education given perioperatively  Induction- intravenous  Postoperative Plan- Plan for postoperative opioid use  Planned trial extubation    Informed Consent- Anesthetic plan and risks discussed with patient  I personally reviewed this patient with the CRNA  Discussed and agreed on the Anesthesia Plan with the CRNA  Rajendra Rosario

## 2022-11-02 NOTE — ANESTHESIA POSTPROCEDURE EVALUATION
Post-Op Assessment Note    CV Status:  Stable    Pain management: adequate     Mental Status:  Alert and awake   Hydration Status:  Euvolemic   PONV Controlled:  Controlled   Airway Patency:  Patent      Post Op Vitals Reviewed: Yes      Staff: CRNA         No complications documented      BP   124/72   Temp   98 3   Pulse  76   Resp   18   SpO2   99

## 2022-11-02 NOTE — DISCHARGE INSTRUCTIONS
Mr Eubanks Roxana underwent transurethral resection of bladder tumor with instillation of intravesical chemotherapy  Everything went just fine  You do not have to go home with a catheter  I will see you in office in about 3 weeks for path discussion in office      Best,    Dr Shira Mays

## 2022-11-02 NOTE — H&P
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  He denies taking any blood thinners  He has not had an alternate intravesical therapies  PAST MEDICAL HISTORY:     Past Medical History:   Diagnosis Date   • Cancer ([address] ) 2017    bladder   • Hyperlipidemia    • Hypertension        PAST SURGICAL HISTORY:     Past Surgical History:   Procedure Laterality Date   • CHOLECYSTECTOMY     • HERNIA REPAIR      bilateral linguinal mesh   • NASAL SEPTUM SURGERY     • NEUROPLASTY / TRANSPOSITION ULNAR NERVE AT ELBOW Left    • PARTIAL NEPHRECTOMY     • RI COLONOSCOPY FLX DX W/COLLJ SPEC WHEN PFRMD N/A 2016    Procedure: COLONOSCOPY;  Surgeon: Mariela Soto MD;  Location: [facility];   Service: Gastroenterology       CURRENT MEDICATIONS:     Current Facility-Administered Medications   Medication Dose Route Frequency Provider Last Rate Last Admin   • ceFAZolin (ANCEF) IVPB (premix in dextrose) 2,000 mg 50 mL  2,000 mg Intravenous Once Padmini Bravo MD       • gemcitabine (GEMZAR) 2,000 mg in sodium chloride 0 9 % 47 4 mL bladder instillation  2,000 mg Intravesical Once Kash Hammer MD       • lactated ringers infusion  125 mL/hr Intravenous Continuous Derrek Fernandez MD           ALLERGIES:   No Known Allergies    SOCIAL HISTORY:     Social History     Socioeconomic History   • Marital status: /Civil Union     Spouse name: None   • Number of children: None   • Years of education: None   • Highest education level: None   Occupational History   • None   Tobacco Use   • Smoking status: Former Smoker     Packs/day: 2 00     Years: 40 00     Pack years: 80 00     Quit date: 2016     Years since quittin 5   • Smokeless tobacco: Never Used   Vaping Use   • Vaping Use: Never used   Substance and Sexual Activity   • Alcohol use: Yes     Comment: rare   • Drug use: No   • Sexual activity: None   Other Topics Concern   • None   Social History Narrative   • None     Social Determinants of Health     Financial Resource Strain: Not on file   Food Insecurity: Not on file   Transportation Needs: Not on file   Physical Activity: Not on file   Stress: Not on file   Social Connections: Not on file   Intimate Partner Violence: Not on file   Housing Stability: Not on file       FAMILY HISTORY:   History reviewed  No pertinent family history  REVIEW OF SYSTEMS:     Pertinent +/- in HPI    PHYSICAL EXAM:     /79   Pulse 74   Temp 98 1 °F (36 7 °C) (Temporal)   Resp 18   Ht 5' 11" (1 803 m)   Wt 110 kg (243 lb)   SpO2 97%   BMI 33 89 kg/m²     General:  Healthy appearing male in no acute distress  Head:  Normocephalic, atraumatic  Eyes: no scleral icterus  ENMT: Nares patent, moist mucous membranes  Cardiovascular:  Regular rate  Respiratory:  Patient has unlabored respirations     Abdomen:  Abdomen nondistended  Neurological: Grossly intact  Psych: Normal affect    LABS:     CBC:   Lab Results   Component Value Date    WBC 7 02 2022    HGB 15 0 2022    HCT 46 0 2022    MCV 88 2022     2022       BMP:   Lab Results   Component Value Date    GLUCOSE 104 (H) 2017    CALCIUM 8 6 2022     2017    K 4 2 2022    CO2 27 2022     2022    BUN 16 2022    CREATININE 1 18 2022     ASSESSMENT:     56 y o  male with history of high risk NMIBC with recurrent tumor at the left bladder base presenting for cysto, TURBT, bilateral RGP and intravesical chemo  Risks discussed with the patient include bleeding, infection, bladder perforation and need for additional therapy      PLAN:     To OR for above  Ancef on call    [facility]  for Urology

## 2022-11-02 NOTE — OP NOTE
OPERATIVE REPORT  PATIENT NAME: Rosina Thomas    :  1965  MRN: [id number]  Pt Location: [facility] OR ROOM 04    SURGERY DATE: 2022    Surgeon(s) and Role: * Mabel Garcia MD - Primary    Preop Diagnosis:  Lesion of bladder [N32 9]    Post-Op Diagnosis Codes:     * Lesion of bladder [N32 9]    Procedure(s) (LRB):  TRANSURETHRAL RESECTION OF BLADDER TUMOR (TURBT), INSTILLATION OF GEMCITABINE (N/A)  CYSTOSCOPY WITH RETROGRADE PYELOGRAM (Bilateral), urethral dilation    Specimen(s):  ID Type Source Tests Collected by Time Destination   1 : Cystoscopic urine for cytology Urine Urine, Bladder Wash CYTOLOGY, URINE Mabel Garcia MD 2022 1433    2 : Left Base Bladder Trigone Bx Tissue Urinary Bladder TISSUE EXAM Mabel Garcia MD 2022 1436        Estimated Blood Loss:   Minimal    Drains:  Urethral Catheter Coude 20 Fr  (Active)   Goal for Removal Will consult urology 22 1457   Site Assessment Clean;Skin intact 22 1457   Collection Container Standard drainage bag 22 1457   Irrigant Other (Comment) 22 1457   Number of days: 0       Anesthesia Type:   General    Operative Indications:  Lesion of bladder [N32 9]  Bladder Cancer    Operative Findings:  3-4mm low grade appearing papillary tumor on the left base of the bladder as well as 1-2mm tumor involving the left ureteral orifice  Both tumors were within a stellate scar consistent with prior resection  Cutting current only was used near the ureteral orifice  Normal bilateral retrograde pyelograms  Complications:   None    Procedure and Technique:  The patient was prepped and draped in dorsal lithotomy position  General anesthesia was initiated  An operative time-out was performed  A rigid cystoscope was passed per urethra into the bladder and a 5 Western Sneha open-ended ureteral catheter was used to shoot a right retrograde pyelogram   I then switched to the bipolar resectoscope    I had to dilate the urethra with male sounds up to 28 Western Sneha to accommodate the 26 Western Sneha resectoscope  A bipolar resection loop was used to resect the index tumor as well as a tumor involving the left ureteral orifice  The tumor over the left ureteral orifice was resected using cutting current only  A retrograde ureteral pyelogram was then shot on the left side  The scope was removed and a 20 Western Sneha coude catheter was placed with 10 cc in the balloon  The bladder was instilled with a gemcitabine  The catheter was clamped  The patient was awoken from anesthesia and transported to recovery in stable condition       I was present for the entire procedure    Patient Disposition:  PACU      Plan:  Chemotherapy to dwell in the bladder for 2 hours  Remove catheter and void prior to discharge   Return to clinic November 21st for path discussion        SIGNATURE: Eunice Villafuerte MD  DATE: November 2, 2022  TIME: 3:19 PM

## 2022-11-03 NOTE — TELEPHONE ENCOUNTER
Pt is s/p TRANSURETHRAL RESECTION OF BLADDER TUMOR (TURBT), INSTILLATION OF GEMCITABINE (N/A Bladder)       CYSTOSCOPY WITH RETROGRADE PYELOGRAM (Bilateral Bladder) with Dr Ezequiel Kauffman  Per D/C note:     Plan:  Chemotherapy to dwell in the bladder for 2 hours  Remove catheter and void prior to discharge   Return to clinic November 21st for path discussion       Post Op Note    Aurelia Hartman is a 64 y o  male s/p TRANSURETHRAL RESECTION OF BLADDER TUMOR (TURBT), INSTILLATION OF GEMCITABINE (N/A Bladder)       CYSTOSCOPY WITH RETROGRADE PYELOGRAM (Bilateral Bladder)  performed 11-2-2022  Aurelia Hartman is a patient of Dr Rocio Anthony and is seen at the [facility] office  How would you rate your pain on a scale from 1 to 10, 10 being the worst pain ever? 2  Have you had a fever? No  Do you have any difficulty urinating? No    Do you have any other questions or concerns that I can address at this time? Spoke with patient and he is doing well  He has been urinating well since having the rodas removed in the hospital  Minimal blood noted when he urinates  Pt is staying well hydrated and monitoring his symptoms  Pt did confirm follow up appointment on 11- at 2:45pm at the [facility] location  Pt is asking is he has any lifting restrictions s/p TURBT  He does lift heavy objects occasionally  Informed patient that I will verify with provider his restrictions and the office will contact him with recommendations

## 2022-11-04 NOTE — TELEPHONE ENCOUNTER
I spoke with patient and provided him with this information:  [facility] Clinical 49 minutes ago (12:02 PM)     BL    Should refrain from heavy lifty, more than 15 pounds for 1-2 weeks  Thanks!

## 2022-11-21 ENCOUNTER — TELEMEDICINE (OUTPATIENT)
Dept: UROLOGY | Facility: CLINIC | Age: 57
End: 2022-11-21

## 2022-11-21 DIAGNOSIS — C67.9 BLADDER CARCINOMA (HCC): Primary | ICD-10-CM

## 2022-11-21 NOTE — PROGRESS NOTES
Virtual Brief Visit    Patient is located in the following state in which I hold an active license [place]      Assessment/Plan:    Problem List Items Addressed This Visit        Genitourinary    Bladder carcinoma ([facility] [address] ) - Primary   We will make arrangements for cystoscopy in 3 months  I will discuss with our charge specialist how billing works if a surveillance cystoscopy is performed in the operating room  This will help us decide whether he should have surveillance cystoscopy at the [facility] or at [place]  --> Will plan for cysto in office at the [facility] due to insurance  Recent Visits  No visits were found meeting these conditions  Showing recent visits within past 7 days and meeting all other requirements  Today's Visits  Date Type Provider Dept   11/21/22 Telemedicine Clint Villafana MD [facility] today's visits and meeting all other requirements  Future Appointments  No visits were found meeting these conditions  Showing future appointments within next 150 days and meeting all other requirements  Subjective  This is a 22-year-old male with history of high-grade T1 urothelial carcinoma of the bladder who has undergone induction BCG x 2 who has had multiple low-grade recurrences  Most recently he was found to have a tumor on cystoscopy September 2, 2022  He underwent TURBT, bilateral retrograde pyelogram, and instillation of gemcitabine on November 2, 2022  He returns to discuss the pathology which returned as papillary urothelial neoplasm of low malignant potential (PUNLMP)  He has healed as expected from surgery      Visit Time    Visit Start Time: 8:39AM  Visit Stop Time: 8:49AM  Total Visit Duration: 10 minutes

## 2023-02-22 NOTE — PROGRESS NOTES
Problem List Items Addressed This Visit        Genitourinary    Bladder carcinoma ([facility] [address] )    Relevant Orders    Cytology, urine    Papillary renal cell carcinoma (HCC)    Relevant Orders    CT abdomen w contrast    Basic metabolic panel    Benign localized prostatic hyperplasia with lower urinary tract symptoms (LUTS) - Primary       Other    Prostate cancer screening    Encounter to discuss test results         Discussion:    Doing well, DIALLO from a bladder cancer perspective  DIALLO for papillary RCC  Voiding well in terms of BPH/LUTS  Low risk for prostate cancer  All questions and concerns answered and addressed    Assessment and plan:       Please see problem oriented charting for the assessment plan of today's urological complaints      Niels Manzo MD      Chief Complaint     Chief Complaint   Patient presents with   • Cystoscopy         History of Present Illness     Sheela Mixon is a 62 y o  man with high grade bladder cancer s/p BCG and a number of resections  His most recent resection showed PUNLMP  ECOG is 0, no new complaints  Anxiety and mindset are improving  Due for imaging for history of RCC in 3 months  I will see him in 3 months for cystoscopy and imaging prior    The following portions of the patient's history were reviewed and updated as appropriate: allergies, current medications, past family history, past medical history, past social history, past surgical history and problem list       Detailed Urologic History     - please refer to HPI    Review of Systems     Review of Systems   Constitutional: Negative  HENT: Negative  Eyes: Negative  Respiratory: Negative  Cardiovascular: Negative  Gastrointestinal: Negative  Endocrine: Negative  Genitourinary: Negative  Musculoskeletal: Negative  Skin: Negative  Allergic/Immunologic: Negative  Neurological: Negative  Hematological: Negative      Psychiatric/Behavioral: The patient is nervous/anxious (improving)  Allergies     No Known Allergies    Physical Exam     Physical Exam  Vitals reviewed  Constitutional:       General: He is not in acute distress  Appearance: Normal appearance  He is not ill-appearing, toxic-appearing or diaphoretic  HENT:      Head: Normocephalic and atraumatic  Eyes:      General: No scleral icterus  Right eye: No discharge  Left eye: No discharge  Cardiovascular:      Pulses: Normal pulses  Pulmonary:      Effort: Pulmonary effort is normal    Abdominal:      General: There is no distension  Palpations: There is no mass  Genitourinary:     Penis: Normal     Musculoskeletal:         General: No swelling  Skin:     Coloration: Skin is not jaundiced  Neurological:      General: No focal deficit present  Mental Status: He is alert  Cranial Nerves: No cranial nerve deficit  Psychiatric:         Mood and Affect: Mood normal          Behavior: Behavior normal          Thought Content:  Thought content normal          Judgment: Judgment normal              Vital Signs  Vitals:    02/23/23 0754   BP: 146/84   BP Location: Left arm   Patient Position: Sitting   Cuff Size: Adult   Pulse: 87   SpO2: 98%   Weight: 111 kg (245 lb)   Height: 5' 11" (1 803 m)         Current Medications       Current Outpatient Medications:   •  losartan (COZAAR) 50 mg tablet, Take 50 mg by mouth daily, Disp: , Rfl:   •  rosuvastatin (CRESTOR) 20 MG tablet, Take 20 mg by mouth every morning, Disp: , Rfl:       Active Problems     Patient Active Problem List   Diagnosis   • Actinic keratoses   • Bladder carcinoma (HCC)   • Fatty liver   • Abdominal hernia   • High triglycerides   • Hyperlipidemia LDL goal <160   • Seborrheic keratosis   • Screening for cardiovascular, respiratory, and genitourinary diseases   • Screening for diabetes mellitus (DM)   • Prostate cancer screening   • Prediabetes   • Healthcare maintenance   • [place] cardiac risk 10-20% in next 10 years   • Obesity (BMI 30-39  9)   • Thyroid nodule   • Eczema   • Venous anomaly   • Essential hypertension   • Family history of coronary arteriosclerosis   • Rash   • Papillary renal cell carcinoma (HCC)   • Encounter to discuss test results   • Benign localized prostatic hyperplasia with lower urinary tract symptoms (LUTS)   • Hypertensive heart disease without congestive heart failure   • Left ventricular hypertrophy   • Calcification of coronary artery         Past Medical History     Past Medical History:   Diagnosis Date   • Cancer ([facility] [address] ) 2017    bladder   • Hyperlipidemia    • Hypertension          Surgical History     Past Surgical History:   Procedure Laterality Date   • CHOLECYSTECTOMY     • FL RETROGRADE PYELOGRAM  2022   • HERNIA REPAIR      bilateral linguinal mesh   • NASAL SEPTUM SURGERY     • NEUROPLASTY / TRANSPOSITION ULNAR NERVE AT ELBOW Left    • PARTIAL NEPHRECTOMY     • [place] COLONOSCOPY FLX DX W/COLLJ SPEC WHEN PFRMD N/A 2016    Procedure: COLONOSCOPY;  Surgeon: Raj Hernandez MD;  Location: [facility]; Service: Gastroenterology   • NH CYSTO BLADDER W/URETERAL CATHETERIZATION Bilateral 2022    Procedure: Galo Garcia WITH RETROGRADE PYELOGRAM;  Surgeon: Ny Taylor MD;  Location: [address] [place] [address];  Service: Urology   • NH CYSTOURETHROSCOPY W/DEST &/RMVL TUMOR LARGE N/A 2022    Procedure: TRANSURETHRAL RESECTION OF BLADDER TUMOR (TURBT), INSTILLATION OF GEMCITABINE;  Surgeon: Ny Taylor MD;  Location: [facility];  Service: Urology         Family History     History reviewed  No pertinent family history        Social History     Social History     Social History     Tobacco Use   Smoking Status Former   • Packs/day: 2 00   • Years: 40 00   • Pack years: 80 00   • Types: Cigarettes   • Quit date: 2016   • Years since quittin 8   Smokeless Tobacco Never         Pertinent Lab Values     Lab Results   Component Value Date    CREATININE 1 18 2022 Lab Results   Component Value Date    PSA 1 7 01/25/2021    PSA 2 6 01/22/2020    PSA 2 2 01/18/2019             Pertinent Imaging     CT renal protocol 5/17/22: IMPRESSION:     Postsurgical changes from partial left nephrectomy without findings of local recurrence or metastatic disease in the abdomen or pelvis

## 2023-02-23 ENCOUNTER — PROCEDURE VISIT (OUTPATIENT)
Dept: UROLOGY | Facility: CLINIC | Age: 58
End: 2023-02-23

## 2023-02-23 VITALS
OXYGEN SATURATION: 98 % | HEIGHT: 71 IN | BODY MASS INDEX: 34.3 KG/M2 | DIASTOLIC BLOOD PRESSURE: 84 MMHG | SYSTOLIC BLOOD PRESSURE: 146 MMHG | WEIGHT: 245 LBS | HEART RATE: 87 BPM

## 2023-02-23 DIAGNOSIS — N40.1 BENIGN LOCALIZED PROSTATIC HYPERPLASIA WITH LOWER URINARY TRACT SYMPTOMS (LUTS): Primary | ICD-10-CM

## 2023-02-23 DIAGNOSIS — C64.9 PAPILLARY RENAL CELL CARCINOMA (HCC): ICD-10-CM

## 2023-02-23 DIAGNOSIS — Z12.5 PROSTATE CANCER SCREENING: ICD-10-CM

## 2023-02-23 DIAGNOSIS — C67.9 BLADDER CARCINOMA (HCC): ICD-10-CM

## 2023-02-23 DIAGNOSIS — Z71.2 ENCOUNTER TO DISCUSS TEST RESULTS: ICD-10-CM

## 2023-02-23 NOTE — PROGRESS NOTES
Office Cystoscopy Procedure Note    Indication:    Urothelial carcinoma of the bladder, surveillance    Informed consent   The risks, benefits, complications, treatment options, and expected outcomes were discussed with the patient  The patient concurred with the proposed plan and provided informed consent  Anesthesia  Lidocaine jelly 2%    Antibiotic prophylaxis   None    Procedure  The patient was placed in the supineposition, was prepped and draped in the usual manner using sterile technique, and 2% lidocaine jelly instilled into the urethra  A 17 F flexible cystoscope was then inserted into the urethra and the urethra and bladder carefully examined  The following findings were noted:    Findings:  Urethra:  normal  Prostate:  No lesions, lateral lobe hypertrophy  Bladder:  Clean, no recurrence  Ureteral orifices:  normal  Other findings:  None, retroflex view confirms    Specimens: None                 Complications:    None; patient tolerated the procedure well           Disposition: To home            Condition: Stable    Plan: Negative surveillance cystoscopy, RTC 3 months for surveillance cysto       Cystoscopy     Date/Time 2/23/2023 8:24 AM     Performed by  Thuy Gurrola MD     Authorized by Thuy Gurrola MD      Universal Protocol:  Consent: Verbal consent obtained  Written consent obtained  Risks and benefits: risks, benefits and alternatives were discussed  Consent given by: patient  Patient understanding: patient states understanding of the procedure being performed  Patient consent: the patient's understanding of the procedure matches consent given  Procedure consent: procedure consent matches procedure scheduled  Relevant documents: relevant documents present and verified  Test results: test results available and properly labeled  Site marked: the operative site was not marked  Radiology Images displayed and confirmed   If images not available, report reviewed: imaging studies available  Required items: required blood products, implants, devices, and special equipment available  Patient identity confirmed: verbally with patient and provided demographic data        Procedure Details:  Procedure type: cystoscopy    Patient tolerance: Patient tolerated the procedure well with no immediate complications    Additional Procedure Details: Office Cystoscopy Procedure Note    Indication:    Urothelial carcinoma of the bladder, surveillance    Informed consent   The risks, benefits, complications, treatment options, and expected outcomes were discussed with the patient  The patient concurred with the proposed plan and provided informed consent  Anesthesia  Lidocaine jelly 2%    Antibiotic prophylaxis   None    Procedure  The patient was placed in the supineposition, was prepped and draped in the usual manner using sterile technique, and 2% lidocaine jelly instilled into the urethra  A 17 F flexible cystoscope was then inserted into the urethra and the urethra and bladder carefully examined    The following findings were noted:    Findings:  Urethra:  normal  Prostate:  No lesions, lateral lobe hypertrophy  Bladder:  Clean, no recurrence  Ureteral orifices:  normal  Other findings:  None, retroflex view confirms    Specimens: None                 Complications:    None; patient tolerated the procedure well           Disposition: To home            Condition: Stable    Plan: Negative surveillance cystoscopy, RTC 3 months for surveillance cysto

## 2023-05-18 LAB
BUN SERPL-MCNC: 15 MG/DL (ref 6–24)
BUN/CREAT SERPL: 12 (ref 9–20)
CALCIUM SERPL-MCNC: 9.6 MG/DL (ref 8.7–10.2)
CHLORIDE SERPL-SCNC: 107 MMOL/L (ref 96–106)
CO2 SERPL-SCNC: 22 MMOL/L (ref 20–29)
CREAT SERPL-MCNC: 1.22 MG/DL (ref 0.76–1.27)
EGFR: 69 ML/MIN/1.73
GLUCOSE SERPL-MCNC: 100 MG/DL (ref 70–99)
POTASSIUM SERPL-SCNC: 4.8 MMOL/L (ref 3.5–5.2)
SODIUM SERPL-SCNC: 145 MMOL/L (ref 134–144)

## 2023-05-24 ENCOUNTER — HOSPITAL ENCOUNTER (OUTPATIENT)
Dept: RADIOLOGY | Facility: HOSPITAL | Age: 58
Discharge: HOME | End: 2023-05-24
Attending: UROLOGY

## 2023-05-24 DIAGNOSIS — C64.9 PAPILLARY RENAL CELL CARCINOMA (HCC): ICD-10-CM

## 2023-05-24 RX ADMIN — IOHEXOL 100 ML: 350 INJECTION, SOLUTION INTRAVENOUS at 08:17

## 2023-06-04 PROBLEM — Z12.11 COLON CANCER SCREENING: Status: ACTIVE | Noted: 2023-06-04

## 2023-06-04 RX ORDER — LOSARTAN POTASSIUM 25 MG/1
25 TABLET ORAL DAILY
COMMUNITY
Start: 2023-04-29

## 2023-06-06 NOTE — PROGRESS NOTES
"     Problem List Items Addressed This Visit        Genitourinary    Bladder carcinoma ([facility] [place] [address] )     High risk bladder cancer s/p resections and BCG, no recurrence on cystoscopy today, has cleared 2 years, will move to q 6 months cystoscopies  Due for repeat imaging in 1 year (CT scan clean from urothelial carcinoma perspective)         Relevant Orders    Cytology, urine    Papillary renal cell carcinoma (HCC)     S/p left partial nephrectomy, no recurrence on CT scan, due for repeat CT in 1 year         Benign localized prostatic hyperplasia with lower urinary tract symptoms (LUTS) - Primary     Voiding well, no intermittency or incomplete emptying            Other    Encounter to discuss test results     All results reviewed with Kenton Morris in real time                 Portions of the above record have been created with voice recognition software  Occasional wrong word or \"sound alike\" substitution may have occurred due to the inherent limitations of voice recognition software  Read the chart carefully and recognize, using context, where substitution may have occurred  Assessment and plan:       Please see problem oriented charting for the assessment plan of today's urological complaints      Beti De La Rosa MD      Chief Complaint     As listed above      History of Present Illness     Yury Hutchinson is a 62 y o  man with history and updates as above    ECOG is 0    His father in law is having worsening dementia and this has been tough for his family    No new complaints    The following portions of the patient's history were reviewed and updated as appropriate: allergies, current medications, past family history, past medical history, past social history, past surgical history and problem list     Detailed Urologic History     - please refer to HPI    Review of Systems     Review of Systems   Constitutional: Negative  HENT: Negative  Eyes: Negative  Respiratory: Negative  Cardiovascular: Negative    " Gastrointestinal: Negative  Endocrine: Negative  Genitourinary: Negative  Musculoskeletal: Negative  Skin: Negative  Allergic/Immunologic: Negative  Neurological: Negative  Hematological: Negative  Psychiatric/Behavioral: The patient is nervous/anxious  AUA SYMPTOM SCORE    Flowsheet Row Most Recent Value   AUA SYMPTOM SCORE    How often have you had a sensation of not emptying your bladder completely after you finished urinating? 1 (P)     How often have you had to urinate again less than two hours after you finished urinating? 2 (P)     How often have you found you stopped and started again several times when you urinate? 1 (P)     How often have you found it difficult to postpone urination? 0 (P)     How often have you had a weak urinary stream? 1 (P)     How often have you had to push or strain to begin urination? 1 (P)     How many times did you most typically get up to urinate from the time you went to bed at night until the time you got up in the morning? 1 (P)     Quality of Life: If you were to spend the rest of your life with your urinary condition just the way it is now, how would you feel about that? 1 (P)     AUA SYMPTOM SCORE 7 (P)             Allergies     No Known Allergies    Physical Exam     Physical Exam  Vitals reviewed  Constitutional:       General: He is not in acute distress  Appearance: Normal appearance  He is not ill-appearing, toxic-appearing or diaphoretic  HENT:      Head: Normocephalic and atraumatic  Eyes:      General: No scleral icterus  Right eye: No discharge  Left eye: No discharge  Cardiovascular:      Pulses: Normal pulses  Pulmonary:      Effort: Pulmonary effort is normal    Abdominal:      General: There is no distension  Musculoskeletal:         General: No swelling  Skin:     Coloration: Skin is not jaundiced  Neurological:      General: No focal deficit present  Mental Status: He is alert  "Cranial Nerves: No cranial nerve deficit  Psychiatric:         Mood and Affect: Mood normal          Behavior: Behavior normal          Thought Content: Thought content normal          Judgment: Judgment normal              Vital Signs  Vitals:    06/07/23 0750   BP: 148/86   BP Location: Left arm   Patient Position: Sitting   Cuff Size: Standard   Pulse: 94   Resp: 18   SpO2: 95%   Weight: 111 kg (245 lb)   Height: 5' 11\" (1 803 m)         Current Medications       Current Outpatient Medications:   •  losartan (COZAAR) 25 mg tablet, Take 25 mg by mouth daily, Disp: , Rfl:   •  losartan (COZAAR) 50 mg tablet, Take 50 mg by mouth daily, Disp: , Rfl:   •  rosuvastatin (CRESTOR) 20 MG tablet, Take 20 mg by mouth every morning, Disp: , Rfl:       Active Problems     Patient Active Problem List   Diagnosis   • Actinic keratoses   • Bladder carcinoma (HCC)   • Fatty liver   • Abdominal hernia   • High triglycerides   • Other hyperlipidemia   • Seborrheic keratosis   • Screening for cardiovascular, respiratory, and genitourinary diseases   • Screening for diabetes mellitus (DM)   • Prostate cancer screening   • Prediabetes   • Healthcare maintenance   • [place] cardiac risk 10-20% in next 10 years   • Obesity (BMI 30-39  9)   • Thyroid nodule   • Eczema   • Venous anomaly   • Essential hypertension   • Family history of coronary arteriosclerosis   • Rash   • Papillary renal cell carcinoma (HCC)   • Encounter to discuss test results   • Benign localized prostatic hyperplasia with lower urinary tract symptoms (LUTS)   • Hypertensive heart disease without congestive heart failure   • Left ventricular hypertrophy   • Calcification of coronary artery   • Colon cancer screening         Past Medical History     Past Medical History:   Diagnosis Date   • Cancer ([facility] [address] ) 2017    bladder   • Hyperlipidemia    • Hypertension          Surgical History     Past Surgical History:   Procedure Laterality Date   • CHOLECYSTECTOMY     • FL " RETROGRADE PYELOGRAM  2022   • HERNIA REPAIR      bilateral linguinal mesh   • NASAL SEPTUM SURGERY     • NEUROPLASTY / TRANSPOSITION ULNAR NERVE AT ELBOW Left    • PARTIAL NEPHRECTOMY     • AK COLONOSCOPY FLX DX W/COLLJ SPEC WHEN PFRMD N/A 2016    Procedure: COLONOSCOPY;  Surgeon: Dalia Pearson MD;  Location: [facility] GI LAB; Service: Gastroenterology   • AK CYSTO BLADDER W/URETERAL CATHETERIZATION Bilateral 2022    Procedure: Martha Elise WITH RETROGRADE PYELOGRAM;  Surgeon: Santana Warner MD;  Location: [address] [place] [address];  Service: Urology   • AK CYSTOURETHROSCOPY W/DEST &/RMVL TUMOR LARGE N/A 2022    Procedure: TRANSURETHRAL RESECTION OF BLADDER TUMOR (TURBT), INSTILLATION OF GEMCITABINE;  Surgeon: Santana Warner MD;  Location: [facility];  Service: Urology         Family History     History reviewed  No pertinent family history  Social History     Social History     Social History     Tobacco Use   Smoking Status Former   • Packs/day: 2 00   • Years: 40 00   • Total pack years: 80 00   • Types: Cigarettes   • Quit date: 2016   • Years since quittin 1   Smokeless Tobacco Never         Pertinent Lab Values     Lab Results   Component Value Date    CREATININE 1 2023       Lab Results   Component Value Date    PSA 1 7 2021    PSA 2 6 2020    PSA 2 2 2019               Pertinent Imaging       The patient's images were reviewed by me personally and also in real time with them in the examination room using our PACS imaging system  The imaging findings are significant for    Stable examination      Status post partial nephrectomy upper left kidney  No recurrent mass or CT evidence for metastatic disease in the abdomen      Stable tiny left adrenal gland nodule      Minimal hepatic steatosis  Gaviota Velez

## 2023-06-07 ENCOUNTER — PROCEDURE VISIT (OUTPATIENT)
Dept: UROLOGY | Facility: CLINIC | Age: 58
End: 2023-06-07
Payer: COMMERCIAL

## 2023-06-07 VITALS
OXYGEN SATURATION: 95 % | RESPIRATION RATE: 18 BRPM | BODY MASS INDEX: 34.3 KG/M2 | HEART RATE: 94 BPM | HEIGHT: 71 IN | WEIGHT: 245 LBS | DIASTOLIC BLOOD PRESSURE: 86 MMHG | SYSTOLIC BLOOD PRESSURE: 148 MMHG

## 2023-06-07 DIAGNOSIS — Z71.2 ENCOUNTER TO DISCUSS TEST RESULTS: ICD-10-CM

## 2023-06-07 DIAGNOSIS — C67.9 BLADDER CARCINOMA (HCC): ICD-10-CM

## 2023-06-07 DIAGNOSIS — C64.9 PAPILLARY RENAL CELL CARCINOMA (HCC): ICD-10-CM

## 2023-06-07 DIAGNOSIS — N40.1 BENIGN LOCALIZED PROSTATIC HYPERPLASIA WITH LOWER URINARY TRACT SYMPTOMS (LUTS): Primary | ICD-10-CM

## 2023-06-07 PROCEDURE — 99214 OFFICE O/P EST MOD 30 MIN: CPT | Performed by: UROLOGY

## 2023-06-07 PROCEDURE — 52000 CYSTOURETHROSCOPY: CPT | Performed by: UROLOGY

## 2023-06-07 PROCEDURE — 88112 CYTOPATH CELL ENHANCE TECH: CPT | Performed by: PATHOLOGY

## 2023-06-07 NOTE — ASSESSMENT & PLAN NOTE
High risk bladder cancer s/p resections and BCG, no recurrence on cystoscopy today, has cleared 2 years, will move to q 6 months cystoscopies   Due for repeat imaging in 1 year (CT scan clean from urothelial carcinoma perspective)

## 2023-06-07 NOTE — PROGRESS NOTES
Office Cystoscopy Procedure Note    Indication:    Urothelial carcinoma of the bladder    Informed consent   The risks, benefits, complications, treatment options, and expected outcomes were discussed with the patient  The patient concurred with the proposed plan and provided informed consent  Anesthesia  Lidocaine jelly 2%    Antibiotic prophylaxis   None    Procedure  The patient was placed in the supineposition, was prepped and draped in the usual manner using sterile technique, and 2% lidocaine jelly instilled into the urethra  A 17 F flexible cystoscope was then inserted into the urethra and the urethra and bladder carefully examined  The following findings were noted:    Findings:  Urethra:  Normal  Prostate:  mild lateral lobe hypertrophy, no median lobe, no lesions  Bladder:  No recurrence, no CIS  Ureteral orifices:  orthotopic  Other findings:  None, retroflexed view confirms    Specimens: None                 Complications:    None; patient tolerated the procedure well           Disposition: To home            Condition: Stable    Plan: Negative surveillance cystoscopy, RTC 6 months for surveillance cystoscopy       Cystoscopy     Date/Time 6/7/2023 8:00 AM     Performed by  Jerry Soto MD   Authorized by Jerry Soto MD     Universal Protocol:  Consent: Verbal consent obtained  Written consent obtained  Risks and benefits: risks, benefits and alternatives were discussed  Consent given by: patient  Patient understanding: patient states understanding of the procedure being performed  Patient consent: the patient's understanding of the procedure matches consent given  Procedure consent: procedure consent matches procedure scheduled  Relevant documents: relevant documents present and verified  Test results: test results available and properly labeled  Site marked: the operative site was not marked  Radiology Images displayed and confirmed   If images not available, report reviewed: imaging studies available  Required items: required blood products, implants, devices, and special equipment available  Patient identity confirmed: verbally with patient and provided demographic data        Procedure Details:  Procedure type: cystoscopy    Patient tolerance: Patient tolerated the procedure well with no immediate complications    Additional Procedure Details: Office Cystoscopy Procedure Note    Indication:    Urothelial carcinoma of the bladder    Informed consent   The risks, benefits, complications, treatment options, and expected outcomes were discussed with the patient  The patient concurred with the proposed plan and provided informed consent  Anesthesia  Lidocaine jelly 2%    Antibiotic prophylaxis   None    Procedure  The patient was placed in the supineposition, was prepped and draped in the usual manner using sterile technique, and 2% lidocaine jelly instilled into the urethra  A 17 F flexible cystoscope was then inserted into the urethra and the urethra and bladder carefully examined    The following findings were noted:    Findings:  Urethra:  Normal  Prostate:  mild lateral lobe hypertrophy, no median lobe, no lesions  Bladder:  No recurrence, no CIS  Ureteral orifices:  orthotopic  Other findings:  None, retroflexed view confirms    Specimens: None                 Complications:    None; patient tolerated the procedure well           Disposition: To home            Condition: Stable    Plan: Negative surveillance cystoscopy, RTC 6 months for surveillance cystoscopy

## 2023-06-09 ENCOUNTER — OFFICE VISIT (OUTPATIENT)
Dept: FAMILY MEDICINE CLINIC | Facility: CLINIC | Age: 58
End: 2023-06-09
Payer: COMMERCIAL

## 2023-06-09 VITALS
RESPIRATION RATE: 18 BRPM | DIASTOLIC BLOOD PRESSURE: 70 MMHG | TEMPERATURE: 97.6 F | SYSTOLIC BLOOD PRESSURE: 136 MMHG | HEART RATE: 85 BPM | BODY MASS INDEX: 35 KG/M2 | HEIGHT: 71 IN | WEIGHT: 250 LBS

## 2023-06-09 DIAGNOSIS — Z13.6 SCREENING FOR CARDIOVASCULAR, RESPIRATORY, AND GENITOURINARY DISEASES: ICD-10-CM

## 2023-06-09 DIAGNOSIS — Z12.5 PROSTATE CANCER SCREENING: ICD-10-CM

## 2023-06-09 DIAGNOSIS — I10 ESSENTIAL HYPERTENSION: ICD-10-CM

## 2023-06-09 DIAGNOSIS — E66.9 OBESITY (BMI 30-39.9): ICD-10-CM

## 2023-06-09 DIAGNOSIS — Z12.11 COLON CANCER SCREENING: ICD-10-CM

## 2023-06-09 DIAGNOSIS — E04.1 THYROID NODULE: ICD-10-CM

## 2023-06-09 DIAGNOSIS — C67.9 BLADDER CARCINOMA (HCC): ICD-10-CM

## 2023-06-09 DIAGNOSIS — Z00.00 HEALTHCARE MAINTENANCE: Primary | ICD-10-CM

## 2023-06-09 DIAGNOSIS — E78.49 OTHER HYPERLIPIDEMIA: ICD-10-CM

## 2023-06-09 DIAGNOSIS — Z13.83 SCREENING FOR CARDIOVASCULAR, RESPIRATORY, AND GENITOURINARY DISEASES: ICD-10-CM

## 2023-06-09 DIAGNOSIS — R73.03 PREDIABETES: ICD-10-CM

## 2023-06-09 DIAGNOSIS — Z13.89 SCREENING FOR CARDIOVASCULAR, RESPIRATORY, AND GENITOURINARY DISEASES: ICD-10-CM

## 2023-06-09 DIAGNOSIS — Z13.1 SCREENING FOR DIABETES MELLITUS (DM): ICD-10-CM

## 2023-06-09 PROCEDURE — 99396 PREV VISIT EST AGE 40-64: CPT | Performed by: FAMILY MEDICINE

## 2023-06-09 PROCEDURE — 88112 CYTOPATH CELL ENHANCE TECH: CPT | Performed by: PATHOLOGY

## 2023-06-09 NOTE — PROGRESS NOTES
Assessment/Plan:    No problem-specific Assessment & Plan notes found for this encounter  cpe  Htn/hld stable, wants a local cardiologist to replace Genora Argue from [facility]  Bladder Ca, f/u urology  Thyroid nodule, f/u recommendations from 2021 advised, check tsh  Obesity, suggest wt loss  Colonoscopy suggested     Diagnoses and all orders for this visit:    Healthcare maintenance    Essential hypertension  -     Ambulatory referral to Cardiology; Future    Obesity (BMI 30-39  9)    Other hyperlipidemia  -     Ambulatory referral to Cardiology; Future    Bladder carcinoma [facility])    Colon cancer screening  -     Ambulatory referral to Gastroenterology; Future    Thyroid nodule  -     US thyroid; Future  -     TSH, 3rd generation; Future  -     TSH, 3rd generation    Screening for cardiovascular, respiratory, and genitourinary diseases  -     Lipid Panel with Direct LDL reflex; Future  -     Lipid Panel with Direct LDL reflex    Screening for diabetes mellitus (DM)  -     Comprehensive metabolic panel; Future  -     Comprehensive metabolic panel    Prostate cancer screening  -     PSA, ultrasensitive; Future  -     PSA, ultrasensitive    Prediabetes  -     Hemoglobin A1C; Future  -     Hemoglobin A1C    Other orders  -     losartan (COZAAR) 25 mg tablet; Take 25 mg by mouth daily              Return in about 1 year (around 6/9/2024)  Subjective:      Patient ID: Anita Jolly is a 62 y o  male      Chief Complaint   Patient presents with   • Annual Exam     Lw cma       HPI  Here for cpe  Taking all his meds  Sees cardiology Dr Zbigniew Cruz, [facility]  Taking chol meds also    Urologist Verges    No exercise program  No diet  No etoh  Still nonsmoker since 8/6/21    The following portions of the patient's history were reviewed and updated as appropriate: allergies, current medications, past family history, past medical history, past social history, past surgical history and problem list     Review of Systems   Constitutional: "Negative for chills and fever  Cardiovascular: Negative for chest pain  Current Outpatient Medications   Medication Sig Dispense Refill   • losartan (COZAAR) 25 mg tablet Take 25 mg by mouth daily     • losartan (COZAAR) 50 mg tablet Take 50 mg by mouth daily     • rosuvastatin (CRESTOR) 20 MG tablet Take 20 mg by mouth every morning       No current facility-administered medications for this visit  Objective:    /70   Pulse 85   Temp 97 6 °F (36 4 °C) (Tympanic)   Resp 18   Ht 5' 11\" (1 803 m)   Wt 113 kg (250 lb)   BMI 34 87 kg/m²        Physical Exam  Vitals and nursing note reviewed  Constitutional:       General: He is not in acute distress  Appearance: He is well-developed  He is not ill-appearing  HENT:      Head: Normocephalic  Right Ear: Tympanic membrane and external ear normal  There is no impacted cerumen  Left Ear: Tympanic membrane and external ear normal  There is no impacted cerumen  Mouth/Throat:      Pharynx: No oropharyngeal exudate  Eyes:      General: No scleral icterus  Conjunctiva/sclera: Conjunctivae normal    Cardiovascular:      Rate and Rhythm: Normal rate and regular rhythm  Heart sounds: No murmur heard  Pulmonary:      Effort: Pulmonary effort is normal  No respiratory distress  Breath sounds: No wheezing  Abdominal:      General: There is no distension  Palpations: Abdomen is soft  Tenderness: There is no abdominal tenderness  Hernia: No hernia is present  Genitourinary:     Penis: Normal        Testes: Normal       Prostate: Normal       Rectum: Normal    Musculoskeletal:         General: No deformity  Cervical back: Neck supple  Right lower leg: No edema  Left lower leg: No edema  Skin:     General: Skin is warm and dry  Coloration: Skin is not jaundiced or pale  Neurological:      Mental Status: He is alert  Motor: No weakness        Gait: Gait normal    Psychiatric:    " Mood and Affect: Mood normal          Behavior: Behavior normal          Thought Content: Thought content normal        BMI Counseling: Body mass index is 34 87 kg/m²  The BMI is above normal  Nutrition recommendations include decreasing portion sizes and moderation in carbohydrate intake  Exercise recommendations include exercising 3-5 times per week  No pharmacotherapy was ordered  Rationale for BMI follow-up plan is due to patient being overweight or obese  Depression Screening and Follow-up Plan: Patient was screened for depression during today's encounter  They screened negative with a PHQ-2 score of 0               Eliezer Mane DO

## 2023-06-15 ENCOUNTER — PREP FOR PROCEDURE (OUTPATIENT)
Age: 58
End: 2023-06-15

## 2023-06-15 ENCOUNTER — TELEPHONE (OUTPATIENT)
Age: 58
End: 2023-06-15

## 2023-06-15 DIAGNOSIS — Z86.010 HISTORY OF COLON POLYPS: Primary | ICD-10-CM

## 2023-06-15 NOTE — TELEPHONE ENCOUNTER
"[facility] Assessment    Name: Tai Tanner  YOB: 1965  Last Height: 5' 11\" (1 803 m)  Last weight: 113 kg (250 lb)  BMI: 34 87 kg/m²  Procedure: COLONOSCOPY  Diagnosis: screening  Date of procedure: 9/1/2023  Prep: ?  Responsible : Milla Gomez  Phone#: [phone]  Name completing form: Zain Westfall MA  Date form completed: 06/15/23      If the patient answers yes to any of these questions, schedule in a hospital  Are you pregnant: No  Do you rely on a wheelchair for mobility: No  Have you been diagnosed with End Stage Renal Disease (ESRD): No  Do you need oxygen during the day: No  Have you had a heart attack or stroke within the past three months: No  Have you had a seizure within the past three months: No  Have you ever been informed by anesthesia that you have a difficult airway: No  Additional Questions  Have you had any cardiac testing or are under the care of a Cardiologist (see cardiac list): Yes (Comment: Obtain Cardiac Clearance)  Cardiac list:   Do you have an implanted cardiac defibrillator: No (Comment:  This patient should be scheduled in the hospital)    Have any bleeding problems, such as anemia or hemophilia (If patient has H&H result below 8, schedule in hospital   H&H must be within 30 days of procedure): No    Had an organ transplant within the past 3 months: No    Do you have any present infections: No  Do you get short of breath when walking a few blocks: No  Have you been diagnosed with diabetes: No  Comments (provide cardiac provider information if applicable): dr Berta gonzalez cardio see provider for BP meds       "

## 2023-06-20 LAB
ALBUMIN SERPL-MCNC: 4.5 G/DL (ref 3.8–4.9)
ALBUMIN/GLOB SERPL: 2.4 {RATIO} (ref 1.2–2.2)
ALP SERPL-CCNC: 72 IU/L (ref 44–121)
ALT SERPL-CCNC: 29 IU/L (ref 0–44)
AST SERPL-CCNC: 24 IU/L (ref 0–40)
BILIRUB SERPL-MCNC: 0.3 MG/DL (ref 0–1.2)
BUN SERPL-MCNC: 17 MG/DL (ref 6–24)
BUN/CREAT SERPL: 15 (ref 9–20)
CALCIUM SERPL-MCNC: 9.4 MG/DL (ref 8.7–10.2)
CHLORIDE SERPL-SCNC: 107 MMOL/L (ref 96–106)
CHOLEST SERPL-MCNC: 163 MG/DL (ref 100–199)
CO2 SERPL-SCNC: 22 MMOL/L (ref 20–29)
CREAT SERPL-MCNC: 1.17 MG/DL (ref 0.76–1.27)
EGFR: 73 ML/MIN/1.73
EST. AVERAGE GLUCOSE BLD GHB EST-MCNC: 143 MG/DL
GLOBULIN SER-MCNC: 1.9 G/DL (ref 1.5–4.5)
GLUCOSE SERPL-MCNC: 140 MG/DL (ref 70–99)
HBA1C MFR BLD: 6.6 % (ref 4.8–5.6)
HDLC SERPL-MCNC: 47 MG/DL
LDLC SERPL CALC-MCNC: 89 MG/DL (ref 0–99)
LDLC/HDLC SERPL: 1.9 RATIO (ref 0–3.6)
MICRODELETION SYND BLD/T FISH: NORMAL
POTASSIUM SERPL-SCNC: 4.7 MMOL/L (ref 3.5–5.2)
PROT SERPL-MCNC: 6.4 G/DL (ref 6–8.5)
PSA SERPL DL<=0.01 NG/ML-MCNC: 1.88 NG/ML (ref 0–4)
SL AMB VLDL CHOLESTEROL CALC: 27 MG/DL (ref 5–40)
SODIUM SERPL-SCNC: 143 MMOL/L (ref 134–144)
TRIGL SERPL-MCNC: 157 MG/DL (ref 0–149)
TSH SERPL DL<=0.005 MIU/L-ACNC: 2.9 UIU/ML (ref 0.45–4.5)

## 2023-06-21 ENCOUNTER — HOSPITAL ENCOUNTER (OUTPATIENT)
Dept: RADIOLOGY | Facility: HOSPITAL | Age: 58
Discharge: HOME | End: 2023-06-21
Attending: FAMILY MEDICINE
Payer: COMMERCIAL

## 2023-06-21 DIAGNOSIS — E04.1 THYROID NODULE: ICD-10-CM

## 2023-06-21 PROCEDURE — 76536 US EXAM OF HEAD AND NECK: CPT

## 2023-06-27 DIAGNOSIS — E04.1 THYROID NODULE: Primary | ICD-10-CM

## 2023-06-28 ENCOUNTER — TELEPHONE (OUTPATIENT)
Dept: FAMILY MEDICINE CLINIC | Facility: CLINIC | Age: 58
End: 2023-06-28

## 2023-06-28 NOTE — TELEPHONE ENCOUNTER
Tony Velasco from procedure scheduling called because US biopsy order needs to be addended to include "molecular testing", which she states all biopsies of that nature must.  Pls correct so they can schedule pt. Any questions pls call Tony Velasco.

## 2023-06-29 DIAGNOSIS — E04.1 LEFT THYROID NODULE: Primary | ICD-10-CM

## 2023-07-11 ENCOUNTER — HOSPITAL ENCOUNTER (OUTPATIENT)
Dept: RADIOLOGY | Facility: HOSPITAL | Age: 58
Discharge: HOME | End: 2023-07-11
Attending: FAMILY MEDICINE
Payer: COMMERCIAL

## 2023-07-11 DIAGNOSIS — E04.1 LEFT THYROID NODULE: ICD-10-CM

## 2023-07-11 PROCEDURE — 88173 CYTOPATH EVAL FNA REPORT: CPT | Performed by: STUDENT IN AN ORGANIZED HEALTH CARE EDUCATION/TRAINING PROGRAM

## 2023-07-11 PROCEDURE — 88305 TISSUE EXAM BY PATHOLOGIST: CPT | Performed by: STUDENT IN AN ORGANIZED HEALTH CARE EDUCATION/TRAINING PROGRAM

## 2023-07-11 PROCEDURE — 10005 FNA BX W/US GDN 1ST LES: CPT

## 2023-07-11 PROCEDURE — 88112 CYTOPATH CELL ENHANCE TECH: CPT | Performed by: STUDENT IN AN ORGANIZED HEALTH CARE EDUCATION/TRAINING PROGRAM

## 2023-07-11 RX ORDER — LIDOCAINE HYDROCHLORIDE 10 MG/ML
5 INJECTION, SOLUTION EPIDURAL; INFILTRATION; INTRACAUDAL; PERINEURAL ONCE
Status: COMPLETED | OUTPATIENT
Start: 2023-07-11 | End: 2023-07-11

## 2023-07-11 RX ADMIN — LIDOCAINE HYDROCHLORIDE 3 ML: 10 INJECTION, SOLUTION EPIDURAL; INFILTRATION; INTRACAUDAL; PERINEURAL at 10:19

## 2023-07-14 DIAGNOSIS — R89.9 ABNORMAL THYROID BIOPSY: Primary | ICD-10-CM

## 2023-07-14 PROCEDURE — 88112 CYTOPATH CELL ENHANCE TECH: CPT | Performed by: STUDENT IN AN ORGANIZED HEALTH CARE EDUCATION/TRAINING PROGRAM

## 2023-07-14 PROCEDURE — 88173 CYTOPATH EVAL FNA REPORT: CPT | Performed by: STUDENT IN AN ORGANIZED HEALTH CARE EDUCATION/TRAINING PROGRAM

## 2023-07-14 PROCEDURE — 88305 TISSUE EXAM BY PATHOLOGIST: CPT | Performed by: STUDENT IN AN ORGANIZED HEALTH CARE EDUCATION/TRAINING PROGRAM

## 2023-07-17 ENCOUNTER — TELEPHONE (OUTPATIENT)
Dept: FAMILY MEDICINE CLINIC | Facility: CLINIC | Age: 58
End: 2023-07-17

## 2023-07-17 NOTE — TELEPHONE ENCOUNTER
Pt called and left message, would like to speak to Dr. Janet Alex in regards to setting up appt with ENT for thyroid.

## 2023-07-19 NOTE — TELEPHONE ENCOUNTER
Sw pt  Planning to see Dr Krysta Archuleta in [facility] for consultation  I agreed

## 2023-08-03 PROBLEM — Z12.11 COLON CANCER SCREENING: Status: RESOLVED | Noted: 2023-06-04 | Resolved: 2023-08-03

## 2023-08-25 ENCOUNTER — TELEPHONE (OUTPATIENT)
Dept: GASTROENTEROLOGY | Facility: CLINIC | Age: 58
End: 2023-08-25

## 2023-09-01 ENCOUNTER — ANESTHESIA (OUTPATIENT)
Dept: GASTROENTEROLOGY | Facility: AMBULARY SURGERY CENTER | Age: 58
End: 2023-09-01

## 2023-09-01 ENCOUNTER — HOSPITAL ENCOUNTER (OUTPATIENT)
Dept: GASTROENTEROLOGY | Facility: AMBULARY SURGERY CENTER | Age: 58
Setting detail: OUTPATIENT SURGERY
End: 2023-09-01
Attending: INTERNAL MEDICINE
Payer: COMMERCIAL

## 2023-09-01 ENCOUNTER — ANESTHESIA EVENT (OUTPATIENT)
Dept: GASTROENTEROLOGY | Facility: AMBULARY SURGERY CENTER | Age: 58
End: 2023-09-01

## 2023-09-01 VITALS
HEART RATE: 74 BPM | HEIGHT: 71 IN | WEIGHT: 249.12 LBS | SYSTOLIC BLOOD PRESSURE: 127 MMHG | DIASTOLIC BLOOD PRESSURE: 83 MMHG | BODY MASS INDEX: 34.88 KG/M2 | OXYGEN SATURATION: 94 % | TEMPERATURE: 98.1 F | RESPIRATION RATE: 18 BRPM

## 2023-09-01 DIAGNOSIS — Z86.010 HISTORY OF COLON POLYPS: ICD-10-CM

## 2023-09-01 PROBLEM — Z99.81 OXYGEN DEPENDENT: Status: ACTIVE | Noted: 2023-09-01

## 2023-09-01 RX ORDER — ONDANSETRON 2 MG/ML
4 INJECTION INTRAMUSCULAR; INTRAVENOUS ONCE AS NEEDED
Status: CANCELLED | OUTPATIENT
Start: 2023-09-01

## 2023-09-01 RX ORDER — PROPOFOL 10 MG/ML
INJECTION, EMULSION INTRAVENOUS AS NEEDED
Status: DISCONTINUED | OUTPATIENT
Start: 2023-09-01 | End: 2023-09-01

## 2023-09-01 RX ORDER — LIDOCAINE HYDROCHLORIDE 10 MG/ML
INJECTION, SOLUTION EPIDURAL; INFILTRATION; INTRACAUDAL; PERINEURAL AS NEEDED
Status: DISCONTINUED | OUTPATIENT
Start: 2023-09-01 | End: 2023-09-01

## 2023-09-01 RX ORDER — PROPOFOL 10 MG/ML
INJECTION, EMULSION INTRAVENOUS CONTINUOUS PRN
Status: DISCONTINUED | OUTPATIENT
Start: 2023-09-01 | End: 2023-09-01

## 2023-09-01 RX ORDER — SODIUM CHLORIDE, SODIUM LACTATE, POTASSIUM CHLORIDE, CALCIUM CHLORIDE 600; 310; 30; 20 MG/100ML; MG/100ML; MG/100ML; MG/100ML
INJECTION, SOLUTION INTRAVENOUS CONTINUOUS PRN
Status: DISCONTINUED | OUTPATIENT
Start: 2023-09-01 | End: 2023-09-01

## 2023-09-01 RX ADMIN — LIDOCAINE HYDROCHLORIDE 50 MG: 10 INJECTION, SOLUTION EPIDURAL; INFILTRATION; INTRACAUDAL at 10:38

## 2023-09-01 RX ADMIN — SODIUM CHLORIDE, SODIUM LACTATE, POTASSIUM CHLORIDE, AND CALCIUM CHLORIDE: .6; .31; .03; .02 INJECTION, SOLUTION INTRAVENOUS at 10:33

## 2023-09-01 RX ADMIN — PROPOFOL 100 MG: 10 INJECTION, EMULSION INTRAVENOUS at 10:38

## 2023-09-01 RX ADMIN — PROPOFOL 150 MCG/KG/MIN: 10 INJECTION, EMULSION INTRAVENOUS at 10:39

## 2023-09-01 NOTE — ANESTHESIA POSTPROCEDURE EVALUATION
Post-Op Assessment Note    CV Status:  Stable  Pain Score: 0    Pain management: adequate     Mental Status:  Arousable and sleepy   Hydration Status:  Stable and euvolemic   PONV Controlled:  Controlled   Airway Patency:  Patent and adequate      Post Op Vitals Reviewed: Yes      Staff: CRNA         No notable events documented.     BP   112/66   Temp      Pulse 74   Resp 14   SpO2 98

## 2023-09-01 NOTE — H&P
History and Physical - [facility]  Suzy Pickard 62 y.o. male MRN: [id number]        HPI: 55-year-old male with history hypertension, family history of colon cancer in his father. He also had a small polyp removed in the past.    Historical Information   Past Medical History:   Diagnosis Date   • Cancer ([address]) 2017    bladder   • Hyperlipidemia    • Hypertension      Past Surgical History:   Procedure Laterality Date   • CHOLECYSTECTOMY     • [place] RETROGRADE PYELOGRAM  2022   • HERNIA REPAIR      bilateral linguinal mesh   • NASAL SEPTUM SURGERY     • NEUROPLASTY / TRANSPOSITION ULNAR NERVE AT ELBOW Left    • PARTIAL NEPHRECTOMY     • [place] COLONOSCOPY FLX DX W/COLLJ SPEC WHEN PFRMD N/A 2016    Procedure: COLONOSCOPY;  Surgeon: Seema Olivera MD;  Location: [address] [place] [address] GI LAB; Service: Gastroenterology   • [place] CYSTO BLADDER W/URETERAL CATHETERIZATION Bilateral 2022    Procedure: CYSTOSCOPY WITH RETROGRADE PYELOGRAM;  Surgeon: Alia Álvarez MD;  Location: [facility];  Service: Urology   • MN CYSTOURETHROSCOPY W/DEST &/RMVL TUMOR LARGE N/A 2022    Procedure: TRANSURETHRAL RESECTION OF BLADDER TUMOR (TURBT), INSTILLATION OF GEMCITABINE;  Surgeon: Alia Álvarez MD;  Location: [facility];  Service: Urology   • [address] [place] [address] THYROID BIOPSY  2023     Social History   Social History     Substance and Sexual Activity   Alcohol Use Yes    Comment: rare     Social History     Substance and Sexual Activity   Drug Use No     Social History     Tobacco Use   Smoking Status Former   • Packs/day: 2.00   • Years: 40.00   • Total pack years: 80.00   • Types: Cigarettes   • Quit date: 2016   • Years since quittin.3   Smokeless Tobacco Never     History reviewed. No pertinent family history. Meds/Allergies     (Not in a hospital admission)      No Known Allergies    Objective     Blood pressure 148/86, pulse 81, temperature 98.1 °F (36.7 °C), temperature source Skin, resp.  rate 18, height 5' 11" (1.803 m), weight 113 kg (249 lb 1.9 oz), SpO2 97 %.     PHYSICAL EXAM:    Gen: NAD  CV: S1 & S2 normal, RRR  CHEST: Clear to auscultate  ABD: soft, NT/ND, good bowel sounds  EXT: no edema    ASSESSMENT:     Personal history of colon polyps and family history of colon cancer    PLAN:    Colonoscopy

## 2023-09-01 NOTE — ANESTHESIA PREPROCEDURE EVALUATION
Procedure:  COLONOSCOPY    Relevant Problems   ANESTHESIA (within normal limits)      CARDIO   (+) Essential hypertension   (+) High triglycerides   (+) Other hyperlipidemia      GI/HEPATIC   (+) Fatty liver      /RENAL   (+) Benign localized prostatic hyperplasia with lower urinary tract symptoms (LUTS)   (+) Papillary renal cell carcinoma (HCC)      PULMONARY (within normal limits)        Physical Exam    Airway    Mallampati score: II  TM Distance: >3 FB  Neck ROM: full     Dental       Cardiovascular  Cardiovascular exam normal    Pulmonary  Pulmonary exam normal     Other Findings        Anesthesia Plan  ASA Score- 2     Anesthesia Type- IV sedation with anesthesia with ASA Monitors. Additional Monitors:   Airway Plan:           Plan Factors-Exercise tolerance (METS): >4 METS. Chart reviewed. EKG reviewed. Existing labs reviewed. Patient summary reviewed. Patient is not a current smoker. Induction-     Postoperative Plan-     Informed Consent- Anesthetic plan and risks discussed with patient. I personally reviewed this patient with the CRNA. Discussed and agreed on the Anesthesia Plan with the CRNA. Matt Mendoza

## 2023-12-14 NOTE — PROGRESS NOTES
Problem List Items Addressed This Visit       Bladder carcinoma ([address])     High risk bladder cancer status post resection and BCG. No evidence of recurrence. This is his first 6-month surveillance cystoscopy. Urine cytology sent today. If positive will recommend bilateral retrograde pyelography with possible ureteral washing and random bladder biopsies. If negative, will continue with every 6-month surveillance cystoscopy         Prostate cancer screening     Due for repeat PSA testing. Currently low risk for prostate cancer         Relevant Orders    PSA Total, Diagnostic    Papillary renal cell carcinoma (HCC)     Status post partial nephrectomy with concern for possible tumor capsule violation at the time. No recurrence on his last CT scan. He is due for repeat imaging in 6 months which I have ordered         Relevant Orders    CT abdomen w wo contrast    CBC and differential    Basic metabolic panel    Encounter to discuss test results     All questions and concerns answered and addressed in real-time         Benign localized prostatic hyperplasia with lower urinary tract symptoms (LUTS) - Primary     Mild lower urinary tract symptoms at this time, I do not recommend surgical or medical therapies currently. Continue surveillance of LUTS                I have spent a total time of 40 minutes on 12/15/23 in caring for this patient including Diagnostic results, Prognosis, Risks and benefits of tx options, Instructions for management, Patient and family education, Importance of tx compliance, Risk factor reductions, Impressions, Counseling / Coordination of care, Documenting in the medical record, Reviewing / ordering tests, medicine, procedures  , Obtaining or reviewing history  , and Communicating with other healthcare professionals . Portions of the above record have been created with voice recognition software.   Occasional wrong word or "sound alike" substitution may have occurred due to the inherent limitations of voice recognition software. Read the chart carefully and recognize, using context, where substitution may have occurred. Assessment and plan:       Please see problem oriented charting for the assessment plan of today's urological complaints      Wojciech Teran MD      Chief Complaint     As listed above      History of Present Illness     Royal Rios is a 62 y.o. man with a history of bladder cancer and papillary renal cell carcinoma. He had part of his thyroid removed and the pathology was benign. He is doing well overall with an ECOG of 0. No new urologic complaints today. The following portions of the patient's history were reviewed and updated as appropriate: allergies, current medications, past family history, past medical history, past social history, past surgical history and problem list.    Detailed Urologic History     - please refer to HPI    Review of Systems     Review of Systems   Constitutional: Negative. HENT: Negative. Eyes: Negative. Respiratory: Negative. Cardiovascular: Negative. Gastrointestinal: Negative. Endocrine: Negative. Genitourinary: Negative. Musculoskeletal: Negative. Skin: Negative. Allergic/Immunologic: Negative. Neurological: Negative. Hematological: Negative. Psychiatric/Behavioral: Negative. Allergies     No Known Allergies    Physical Exam     Physical Exam  Vitals and nursing note reviewed. Constitutional:       General: He is not in acute distress. Appearance: Normal appearance. He is well-developed. He is not ill-appearing, toxic-appearing or diaphoretic. HENT:      Head: Normocephalic and atraumatic. Eyes:      General: No scleral icterus. Pulmonary:      Effort: Pulmonary effort is normal. No respiratory distress. Abdominal:      General: There is no distension. Palpations: Abdomen is soft. Tenderness: There is no abdominal tenderness.  There is no right CVA tenderness or left CVA tenderness. Genitourinary:     Penis: Normal.    Musculoskeletal:         General: No deformity. Cervical back: Neck supple. Skin:     Coloration: Skin is not jaundiced or pale. Neurological:      Mental Status: He is alert and oriented to person, place, and time. Mental status is at baseline. Cranial Nerves: No cranial nerve deficit. Motor: No weakness. Coordination: Coordination normal.   Psychiatric:         Mood and Affect: Mood normal.         Behavior: Behavior normal.         Thought Content: Thought content normal.         Judgment: Judgment normal.             Vital Signs  Vitals:    12/15/23 0744   BP: 140/80   BP Location: Left arm   Patient Position: Sitting   Cuff Size: Standard   Pulse: 71   SpO2: 98%   Weight: 116 kg (256 lb)   Height: 5' 11" (1.803 m)         Current Medications       Current Outpatient Medications:     losartan (COZAAR) 25 mg tablet, Take 25 mg by mouth daily, Disp: , Rfl:     losartan (COZAAR) 50 mg tablet, Take 50 mg by mouth daily, Disp: , Rfl:     rosuvastatin (CRESTOR) 20 MG tablet, Take 20 mg by mouth every morning, Disp: , Rfl:       Active Problems     Patient Active Problem List   Diagnosis    Actinic keratoses    Bladder carcinoma ([address])    Fatty liver    Abdominal hernia    High triglycerides    Other hyperlipidemia    Seborrheic keratosis    Screening for cardiovascular, respiratory, and genitourinary diseases    Screening for diabetes mellitus (DM)    Prostate cancer screening    Prediabetes    Healthcare maintenance    [place] cardiac risk 10-20% in next 10 years    Obesity (BMI 30-39. 9)    Thyroid nodule    Eczema    Venous anomaly    Essential hypertension    Family history of coronary arteriosclerosis    Rash    Papillary renal cell carcinoma (HCC)    Encounter to discuss test results    Benign localized prostatic hyperplasia with lower urinary tract symptoms (LUTS)    Hypertensive heart disease without congestive heart failure    Left ventricular hypertrophy    Calcification of coronary artery         Past Medical History     Past Medical History:   Diagnosis Date    Cancer ([address]) 2017    bladder    Hyperlipidemia     Hypertension          Surgical History     Past Surgical History:   Procedure Laterality Date    CHOLECYSTECTOMY      FL RETROGRADE PYELOGRAM  2022    HERNIA REPAIR      bilateral linguinal mesh    NASAL SEPTUM SURGERY      NEUROPLASTY / TRANSPOSITION ULNAR NERVE AT ELBOW Left     PARTIAL NEPHRECTOMY      [place] COLONOSCOPY FLX DX W/COLLJ SPEC WHEN PFRMD N/A 2016    Procedure: COLONOSCOPY;  Surgeon: Chio Bernabe MD;  Location: [address] [place] [address] GI LAB; Service: Gastroenterology    N[place] CYSTO BLADDER W/URETERAL CATHETERIZATION Bilateral 2022    Procedure: CYSTOSCOPY WITH RETROGRADE PYELOGRAM;  Surgeon: El Lua MD;  Location: [facility];  Service: Urology    [place] CYSTOURETHROSCOPY W/DEST &/RMVL TUMOR LARGE N/A 2022    Procedure: TRANSURETHRAL RESECTION OF BLADDER TUMOR (TURBT), INSTILLATION OF GEMCITABINE;  Surgeon: El Lua MD;  Location: [facility];  Service: Urology    [address] [place] [address] THYROID BIOPSY  2023         Family History     History reviewed. No pertinent family history.       Social History     Social History     Social History     Tobacco Use   Smoking Status Former    Current packs/day: 0.00    Average packs/day: 2.0 packs/day for 40.0 years (80.0 ttl pk-yrs)    Types: Cigarettes    Start date: 1976    Quit date: 2016    Years since quittin.6   Smokeless Tobacco Never         Pertinent Lab Values     Lab Results   Component Value Date    CREATININE 1.17 2023       Lab Results   Component Value Date    PSA 1.7 2021    PSA 2.6 2020    PSA 2.2 2019

## 2023-12-15 ENCOUNTER — PROCEDURE VISIT (OUTPATIENT)
Dept: UROLOGY | Facility: CLINIC | Age: 58
End: 2023-12-15
Payer: COMMERCIAL

## 2023-12-15 VITALS
HEIGHT: 71 IN | BODY MASS INDEX: 35.84 KG/M2 | DIASTOLIC BLOOD PRESSURE: 80 MMHG | HEART RATE: 71 BPM | WEIGHT: 256 LBS | OXYGEN SATURATION: 98 % | SYSTOLIC BLOOD PRESSURE: 140 MMHG

## 2023-12-15 DIAGNOSIS — C64.9 PAPILLARY RENAL CELL CARCINOMA (HCC): Primary | ICD-10-CM

## 2023-12-15 DIAGNOSIS — N40.1 BENIGN LOCALIZED PROSTATIC HYPERPLASIA WITH LOWER URINARY TRACT SYMPTOMS (LUTS): ICD-10-CM

## 2023-12-15 DIAGNOSIS — Z12.5 PROSTATE CANCER SCREENING: ICD-10-CM

## 2023-12-15 DIAGNOSIS — Z71.2 ENCOUNTER TO DISCUSS TEST RESULTS: ICD-10-CM

## 2023-12-15 DIAGNOSIS — C67.9 BLADDER CARCINOMA (HCC): ICD-10-CM

## 2023-12-15 PROCEDURE — 52000 CYSTOURETHROSCOPY: CPT | Performed by: UROLOGY

## 2023-12-15 PROCEDURE — 99214 OFFICE O/P EST MOD 30 MIN: CPT | Performed by: UROLOGY

## 2023-12-15 NOTE — ASSESSMENT & PLAN NOTE
High risk bladder cancer status post resection and BCG. No evidence of recurrence. This is his first 6-month surveillance cystoscopy. Urine cytology sent today. If positive will recommend bilateral retrograde pyelography with possible ureteral washing and random bladder biopsies.   If negative, will continue with every 6-month surveillance cystoscopy

## 2023-12-15 NOTE — ASSESSMENT & PLAN NOTE
Status post partial nephrectomy with concern for possible tumor capsule violation at the time. No recurrence on his last CT scan.   He is due for repeat imaging in 6 months which I have ordered

## 2023-12-15 NOTE — PROGRESS NOTES
Office Cystoscopy Procedure Note    Indication:    Urothelial carcinoma of the bladder    Informed consent   The risks, benefits, complications, treatment options, and expected outcomes were discussed with the patient. The patient concurred with the proposed plan and provided informed consent. Anesthesia  Lidocaine jelly 2%    Antibiotic prophylaxis   None    Procedure  The patient was placed in the supineposition, was prepped and draped in the usual manner using sterile technique, and 2% lidocaine jelly instilled into the urethra. A 17 F flexible cystoscope was then inserted into the urethra and the urethra and bladder carefully examined. The following findings were noted:    Findings:  Urethra:  Normal  Prostate:  moderate lateral lobe hypertrophy, no median lobe, no lesions  Bladder:  Clean mucosa, no recurrence  Ureteral orifices:  orthotopic  Other findings:  None, retroflexed view confirms    Specimens: None                 Complications:    None; patient tolerated the procedure well           Disposition: To home            Condition: Stable    Plan: No recurrence no CIS, urine cytology sent, RTC 6 months for cystoscopy       Cystoscopy     Date/Time  12/15/2023 8:00 AM     Performed by  Nidia Edmond MD   Authorized by  Nidia Edmond MD     Universal Protocol:  Consent: Verbal consent obtained. Written consent obtained. Risks and benefits: risks, benefits and alternatives were discussed  Consent given by: patient  Patient understanding: patient states understanding of the procedure being performed  Patient consent: the patient's understanding of the procedure matches consent given  Procedure consent: procedure consent matches procedure scheduled  Relevant documents: relevant documents present and verified  Test results: test results available and properly labeled  Site marked: the operative site was not marked  Radiology Images displayed and confirmed.  If images not available, report reviewed: imaging studies available  Required items: required blood products, implants, devices, and special equipment available  Patient identity confirmed: verbally with patient and provided demographic data      Procedure Details:  Procedure type: cystoscopy    Patient tolerance: Patient tolerated the procedure well with no immediate complications    Additional Procedure Details: Office Cystoscopy Procedure Note    Indication:    Urothelial carcinoma of the bladder    Informed consent   The risks, benefits, complications, treatment options, and expected outcomes were discussed with the patient. The patient concurred with the proposed plan and provided informed consent. Anesthesia  Lidocaine jelly 2%    Antibiotic prophylaxis   None    Procedure  The patient was placed in the supineposition, was prepped and draped in the usual manner using sterile technique, and 2% lidocaine jelly instilled into the urethra. A 17 F flexible cystoscope was then inserted into the urethra and the urethra and bladder carefully examined.   The following findings were noted:    Findings:  Urethra:  Normal  Prostate:  moderate lateral lobe hypertrophy, no median lobe, no lesions  Bladder:  Clean mucosa, no recurrence  Ureteral orifices:  orthotopic  Other findings:  None, retroflexed view confirms    Specimens: None                 Complications:    None; patient tolerated the procedure well           Disposition: To home            Condition: Stable    Plan: No recurrence no CIS, urine cytology sent, RTC 6 months for cystoscopy

## 2023-12-15 NOTE — ASSESSMENT & PLAN NOTE
Mild lower urinary tract symptoms at this time, I do not recommend surgical or medical therapies currently.   Continue surveillance of LUTS

## 2023-12-18 LAB
CYTOLOGIST CVX/VAG CYTO: NORMAL
DX ICD CODE: NORMAL
PATH REPORT.FINAL DX SPEC: NORMAL
PATH REPORT.GROSS SPEC: NORMAL
PATH REPORT.SITE OF ORIGIN SPEC: NORMAL
PATHOLOGIST NAME: NORMAL

## 2024-02-19 ENCOUNTER — TELEPHONE (OUTPATIENT)
Age: 59
End: 2024-02-19

## 2024-02-19 NOTE — TELEPHONE ENCOUNTER
Pt has cysto scheduled with Dr. Alicea on 6/5/24.   Pt would like to know if Dr. Alicea wants him to have the PSA done before Cysto appt.    Pt's insurance will pay for once a year and his last PSA was done on 6/19/23.    Pt wanted to know how Dr. Alicea wants to handle this- Can Pt get psa done after cysto?    Pt req cb [phone]

## 2024-02-21 PROBLEM — Z13.1 SCREENING FOR DIABETES MELLITUS (DM): Status: RESOLVED | Noted: 2019-01-14 | Resolved: 2024-02-21

## 2024-02-21 PROBLEM — Z13.6 SCREENING FOR CARDIOVASCULAR, RESPIRATORY, AND GENITOURINARY DISEASES: Status: RESOLVED | Noted: 2019-01-14 | Resolved: 2024-02-21

## 2024-02-21 PROBLEM — Z13.89 SCREENING FOR CARDIOVASCULAR, RESPIRATORY, AND GENITOURINARY DISEASES: Status: RESOLVED | Noted: 2019-01-14 | Resolved: 2024-02-21

## 2024-02-21 PROBLEM — Z13.83 SCREENING FOR CARDIOVASCULAR, RESPIRATORY, AND GENITOURINARY DISEASES: Status: RESOLVED | Noted: 2019-01-14 | Resolved: 2024-02-21

## 2024-02-21 PROBLEM — Z12.5 PROSTATE CANCER SCREENING: Status: RESOLVED | Noted: 2019-01-14 | Resolved: 2024-02-21

## 2024-02-21 PROBLEM — Z00.00 HEALTHCARE MAINTENANCE: Status: RESOLVED | Noted: 2019-01-23 | Resolved: 2024-02-21

## 2024-05-14 ENCOUNTER — TELEPHONE (OUTPATIENT)
Dept: ADMINISTRATIVE | Facility: HOSPITAL | Age: 59
End: 2024-05-14

## 2024-05-14 DIAGNOSIS — C64.9 PAPILLARY RENAL CELL CARCINOMA (HCC): Primary | ICD-10-CM

## 2024-05-15 NOTE — TELEPHONE ENCOUNTER
LET upper pole partial nephrectomy March 2021 for papillary renal cell cancer at outside [facility]    surveillance image requested yearly (last 5/24/23 was DIALLO)    denied w/wo contrast but will approve w contrast    p2p discussion complete  evicore adjusted to CT abdomen w contrast  [id number]    i also added an overdue order CXR thanks can have that done the same day at the ct abdomen

## 2024-05-20 ENCOUNTER — HOSPITAL ENCOUNTER (OUTPATIENT)
Dept: RADIOLOGY | Facility: HOSPITAL | Age: 59
Discharge: HOME | End: 2024-05-20
Payer: COMMERCIAL

## 2024-05-20 ENCOUNTER — HOSPITAL ENCOUNTER (OUTPATIENT)
Dept: RADIOLOGY | Facility: HOSPITAL | Age: 59
Discharge: HOME | End: 2024-05-20
Attending: UROLOGY
Payer: COMMERCIAL

## 2024-05-20 DIAGNOSIS — C64.9 PAPILLARY RENAL CELL CARCINOMA (HCC): ICD-10-CM

## 2024-05-20 PROCEDURE — 71046 X-RAY EXAM CHEST 2 VIEWS: CPT

## 2024-05-20 PROCEDURE — 74160 CT ABDOMEN W/CONTRAST: CPT

## 2024-05-20 RX ADMIN — IOHEXOL 100 ML: 350 INJECTION, SOLUTION INTRAVENOUS at 07:45

## 2024-06-04 NOTE — PROGRESS NOTES
Problem List Items Addressed This Visit       Bladder carcinoma (HCC)     High risk bladder cancer status post resection and BCG, no evidence of recurrence today, urine cytology sent today, upper tract imaging is negative for upper tract lesions.  He will return in 6 months for surveillance cystoscopy, next due for upper tract imaging in 1 year         Essential hypertension     She was 180/90 today, he is taking losartan, I do think he may require a second agent.  We spoke about the relationship of hypertension to endothelial dysfunction contributing to erectile dysfunction.  I recommend that he follow-up with his primary care provider for potential further medical intervention         Papillary renal cell carcinoma (HCC)     History of partial nephrectomy with concern for possible tumor violation at the time of partial nephrectomy, upper tract imaging shows no metastatic disease or nodules at the area of partial nephrectomy, we will continue yearly imaging         Encounter to discuss test results     I have spent a total time of 30 minutes on 06/05/24 in caring for this patient including Diagnostic results, Prognosis, Risks and benefits of tx options, Instructions for management, Patient and family education, Importance of tx compliance, Risk factor reductions, Impressions, Counseling / Coordination of care, Documenting in the medical record, Reviewing / ordering tests, medicine, procedures  , and Obtaining or reviewing history  .          Benign localized prostatic hyperplasia with lower urinary tract symptoms (LUTS) - Primary     Voiding well at this time, I do not recommend medical or surgical therapies, continue surveillance of urinary symptoms         Erectile dysfunction due to arterial insufficiency     Trouble getting and maintaining erection, I have sent a testosterone level for evaluation of his hormonal status.  We did speak about the contribution of age as well as hypertension and hyperlipidemia  "to decrease sexual function.  If he wishes, he will let us know if he wishes to have Viagra or Cialis sent to his pharmacy.  He will discuss this with his wife prior to making this decision.         Relevant Orders    Testosterone, free, total           Portions of the above record have been created with voice recognition software.  Occasional wrong word or \"sound alike\" substitution may have occurred due to the inherent limitations of voice recognition software.  Read the chart carefully and recognize, using context, where substitution may have occurred.    Assessment and plan:       Please see problem oriented charting for the assessment plan of today's urological complaints      Daniel Alicea MD      Chief Complaint     As listed above      History of Present Illness     Getachew Young is a 58 y.o. man with history and updates as above    New complaint of ED    ECOG 0        The following portions of the patient's history were reviewed and updated as appropriate: allergies, current medications, past family history, past medical history, past social history, past surgical history and problem list.    Detailed Urologic History     - please refer to HPI    Review of Systems     Review of Systems   Constitutional: Negative.    HENT: Negative.     Eyes: Negative.    Respiratory: Negative.     Cardiovascular: Negative.    Gastrointestinal: Negative.    Endocrine: Negative.    Genitourinary:         As per HPI   Musculoskeletal: Negative.    Skin: Negative.    Allergic/Immunologic: Negative.    Neurological: Negative.    Hematological: Negative.    Psychiatric/Behavioral: Negative.               Allergies     No Known Allergies    Physical Exam     Physical Exam  Vitals and nursing note reviewed.   Constitutional:       General: He is not in acute distress.     Appearance: Normal appearance. He is well-developed. He is not ill-appearing, toxic-appearing or diaphoretic.   HENT:      Head: Normocephalic and " "atraumatic.   Eyes:      General: No scleral icterus.  Pulmonary:      Effort: Pulmonary effort is normal. No respiratory distress.   Abdominal:      General: There is no distension.      Palpations: Abdomen is soft.      Tenderness: There is no abdominal tenderness.   Genitourinary:     Penis: Normal.    Musculoskeletal:         General: No deformity.      Cervical back: Neck supple.   Skin:     Coloration: Skin is not jaundiced or pale.   Neurological:      Mental Status: He is alert and oriented to person, place, and time. Mental status is at baseline.      Cranial Nerves: No cranial nerve deficit.      Motor: No weakness.      Coordination: Coordination normal.   Psychiatric:         Mood and Affect: Mood normal.         Behavior: Behavior normal.         Thought Content: Thought content normal.         Judgment: Judgment normal.             Vital Signs  Vitals:    06/05/24 0750   BP: (!) 180/90   BP Location: Left arm   Patient Position: Sitting   Cuff Size: Adult   Pulse: 72   SpO2: 97%   Weight: 114 kg (252 lb)   Height: 5' 11\" (1.803 m)         Current Medications       Current Outpatient Medications:     losartan (COZAAR) 25 mg tablet, Take 25 mg by mouth daily, Disp: , Rfl:     losartan (COZAAR) 50 mg tablet, Take 50 mg by mouth daily, Disp: , Rfl:     rosuvastatin (CRESTOR) 20 MG tablet, Take 20 mg by mouth every morning, Disp: , Rfl:       Active Problems     Patient Active Problem List   Diagnosis    Actinic keratoses    Bladder carcinoma (HCC)    Fatty liver    Abdominal hernia    High triglycerides    Other hyperlipidemia    Seborrheic keratosis    Prediabetes    [place] cardiac risk 10-20% in next 10 years    Obesity (BMI 30-39.9)    Thyroid nodule    Eczema    Venous anomaly    Essential hypertension    Family history of coronary arteriosclerosis    Rash    Papillary renal cell carcinoma (HCC)    Encounter to discuss test results    Benign localized prostatic hyperplasia with lower urinary tract " symptoms (LUTS)    Hypertensive heart disease without congestive heart failure    Left ventricular hypertrophy    Calcification of coronary artery    Erectile dysfunction due to arterial insufficiency         Past Medical History     Past Medical History:   Diagnosis Date    Cancer (HCC) 2017    bladder    Hyperlipidemia     Hypertension          Surgical History     Past Surgical History:   Procedure Laterality Date    CHOLECYSTECTOMY      FL RETROGRADE PYELOGRAM  2022    HERNIA REPAIR      bilateral linguinal mesh    NASAL SEPTUM SURGERY      NEUROPLASTY / TRANSPOSITION ULNAR NERVE AT ELBOW Left     PARTIAL NEPHRECTOMY      NE COLONOSCOPY FLX DX W/COLLJ SPEC WHEN PFRMD N/A 2016    Procedure: COLONOSCOPY;  Surgeon: James Khan MD;  Location: [facility] GI LAB;  Service: Gastroenterology    NE CYSTO BLADDER W/URETERAL CATHETERIZATION Bilateral 2022    Procedure: CYSTOSCOPY WITH RETROGRADE PYELOGRAM;  Surgeon: Vikki Scott MD;  Location: [facility];  Service: Urology    NE CYSTOURETHROSCOPY W/DEST &/RMVL TUMOR LARGE N/A 2022    Procedure: TRANSURETHRAL RESECTION OF BLADDER TUMOR (TURBT), INSTILLATION OF GEMCITABINE;  Surgeon: Vikki Scott MD;  Location: [facility];  Service: Urology    US GUIDED THYROID BIOPSY  2023         Family History     History reviewed. No pertinent family history.      Social History     Social History     Social History     Tobacco Use   Smoking Status Former    Current packs/day: 0.00    Average packs/day: 2.0 packs/day for 40.0 years (80.0 ttl pk-yrs)    Types: Cigarettes    Start date: 1976    Quit date: 2016    Years since quittin.1   Smokeless Tobacco Never         Pertinent Lab Values     Lab Results   Component Value Date    CREATININE 1.17 2023       Lab Results   Component Value Date    PSA 1.7 2021    PSA 2.6 2020    PSA 2.2 2019       Urine cytology negative for HG urothelial carcinoma        Pertinent Imaging        The patient's images were reviewed by me personally  using our PACS imaging system.  The imaging findings are significant for:    IMPRESSION:     Prior partial left nephrectomy without imaging findings of local recurrence or metastasis in the abdomen   .

## 2024-06-05 ENCOUNTER — PROCEDURE VISIT (OUTPATIENT)
Dept: UROLOGY | Facility: CLINIC | Age: 59
End: 2024-06-05
Payer: COMMERCIAL

## 2024-06-05 VITALS
BODY MASS INDEX: 35.28 KG/M2 | DIASTOLIC BLOOD PRESSURE: 90 MMHG | HEIGHT: 71 IN | OXYGEN SATURATION: 97 % | SYSTOLIC BLOOD PRESSURE: 180 MMHG | HEART RATE: 72 BPM | WEIGHT: 252 LBS

## 2024-06-05 DIAGNOSIS — N40.1 BENIGN LOCALIZED PROSTATIC HYPERPLASIA WITH LOWER URINARY TRACT SYMPTOMS (LUTS): Primary | ICD-10-CM

## 2024-06-05 DIAGNOSIS — I10 ESSENTIAL HYPERTENSION: ICD-10-CM

## 2024-06-05 DIAGNOSIS — C67.9 BLADDER CARCINOMA (HCC): ICD-10-CM

## 2024-06-05 DIAGNOSIS — C64.9 PAPILLARY RENAL CELL CARCINOMA (HCC): ICD-10-CM

## 2024-06-05 DIAGNOSIS — N52.01 ERECTILE DYSFUNCTION DUE TO ARTERIAL INSUFFICIENCY: ICD-10-CM

## 2024-06-05 DIAGNOSIS — Z71.2 ENCOUNTER TO DISCUSS TEST RESULTS: ICD-10-CM

## 2024-06-05 PROCEDURE — 99214 OFFICE O/P EST MOD 30 MIN: CPT | Performed by: UROLOGY

## 2024-06-05 PROCEDURE — 52000 CYSTOURETHROSCOPY: CPT | Performed by: UROLOGY

## 2024-06-05 PROCEDURE — 88112 CYTOPATH CELL ENHANCE TECH: CPT | Performed by: PATHOLOGY

## 2024-06-05 NOTE — ASSESSMENT & PLAN NOTE
Trouble getting and maintaining erection, I have sent a testosterone level for evaluation of his hormonal status.  We did speak about the contribution of age as well as hypertension and hyperlipidemia to decrease sexual function.  If he wishes, he will let us know if he wishes to have Viagra or Cialis sent to his pharmacy.  He will discuss this with his wife prior to making this decision.

## 2024-06-05 NOTE — PROGRESS NOTES
Office Cystoscopy Procedure Note    Indication:    Urothelial carcinoma of the bladder    Informed consent   The risks, benefits, complications, treatment options, and expected outcomes were discussed with the patient. The patient concurred with the proposed plan and provided informed consent.    Anesthesia  Lidocaine jelly 2%    Antibiotic prophylaxis   None    Procedure  The patient was placed in the supineposition, was prepped and draped in the usual manner using sterile technique, and 2% lidocaine jelly instilled into the urethra.  A 17 F flexible cystoscope was then inserted into the urethra and the urethra and bladder carefully examined.  The following findings were noted:    Findings:  Urethra:  Normal  Prostate:  mild lateral lobe hypertrophy, no median lobe, no lesions  Bladder:  No recurrence, no CIS  Ureteral orifices:  orthotopic  Other findings:  None, retroflexed view confirms    Specimens: None                 Complications:    None; patient tolerated the procedure well           Disposition: To home            Condition: Stable    Plan: Negative surveillance cystoscopy, RTC 6 months for cystoscopy       Cystoscopy     Date/Time  6/5/2024 8:00 AM     Performed by  Daniel Alicea MD   Authorized by  Daniel Alicea MD     Universal Protocol:  Consent: Verbal consent obtained. Written consent obtained.  Risks and benefits: risks, benefits and alternatives were discussed  Consent given by: patient  Patient understanding: patient states understanding of the procedure being performed  Patient consent: the patient's understanding of the procedure matches consent given  Procedure consent: procedure consent matches procedure scheduled  Relevant documents: relevant documents present and verified  Test results: test results available and properly labeled  Site marked: the operative site was not marked  Radiology Images displayed and confirmed. If images not available, report reviewed: imaging studies  available  Required items: required blood products, implants, devices, and special equipment available  Patient identity confirmed: verbally with patient and provided demographic data      Procedure Details:  Procedure type: cystoscopy    Patient tolerance: Patient tolerated the procedure well with no immediate complications    Additional Procedure Details: Office Cystoscopy Procedure Note    Indication:    Urothelial carcinoma of the bladder    Informed consent   The risks, benefits, complications, treatment options, and expected outcomes were discussed with the patient. The patient concurred with the proposed plan and provided informed consent.    Anesthesia  Lidocaine jelly 2%    Antibiotic prophylaxis   None    Procedure  The patient was placed in the supineposition, was prepped and draped in the usual manner using sterile technique, and 2% lidocaine jelly instilled into the urethra.  A 17 F flexible cystoscope was then inserted into the urethra and the urethra and bladder carefully examined.  The following findings were noted:    Findings:  Urethra:  Normal  Prostate:  mild lateral lobe hypertrophy, no median lobe, no lesions  Bladder:  No recurrence, no CIS  Ureteral orifices:  orthotopic  Other findings:  None, retroflexed view confirms    Specimens: None                 Complications:    None; patient tolerated the procedure well           Disposition: To home            Condition: Stable    Plan: Negative surveillance cystoscopy, RTC 6 months for cystoscopy

## 2024-06-05 NOTE — ASSESSMENT & PLAN NOTE
I have spent a total time of 30 minutes on 06/05/24 in caring for this patient including Diagnostic results, Prognosis, Risks and benefits of tx options, Instructions for management, Patient and family education, Importance of tx compliance, Risk factor reductions, Impressions, Counseling / Coordination of care, Documenting in the medical record, Reviewing / ordering tests, medicine, procedures  , and Obtaining or reviewing history  .

## 2024-06-05 NOTE — ASSESSMENT & PLAN NOTE
High risk bladder cancer status post resection and BCG, no evidence of recurrence today, urine cytology sent today, upper tract imaging is negative for upper tract lesions.  He will return in 6 months for surveillance cystoscopy, next due for upper tract imaging in 1 year

## 2024-06-05 NOTE — ASSESSMENT & PLAN NOTE
History of partial nephrectomy with concern for possible tumor violation at the time of partial nephrectomy, upper tract imaging shows no metastatic disease or nodules at the area of partial nephrectomy, we will continue yearly imaging

## 2024-06-05 NOTE — ASSESSMENT & PLAN NOTE
She was 180/90 today, he is taking losartan, I do think he may require a second agent.  We spoke about the relationship of hypertension to endothelial dysfunction contributing to erectile dysfunction.  I recommend that he follow-up with his primary care provider for potential further medical intervention

## 2024-06-05 NOTE — ASSESSMENT & PLAN NOTE
Voiding well at this time, I do not recommend medical or surgical therapies, continue surveillance of urinary symptoms

## 2024-06-07 PROCEDURE — 88112 CYTOPATH CELL ENHANCE TECH: CPT | Performed by: PATHOLOGY

## 2024-11-26 LAB
BASOPHILS # BLD AUTO: 0.1 X10E3/UL (ref 0–0.2)
BASOPHILS NFR BLD AUTO: 1 %
BUN SERPL-MCNC: 14 MG/DL (ref 6–24)
BUN/CREAT SERPL: 13 (ref 9–20)
CALCIUM SERPL-MCNC: 9.3 MG/DL (ref 8.7–10.2)
CHLORIDE SERPL-SCNC: 98 MMOL/L (ref 96–106)
CO2 SERPL-SCNC: 22 MMOL/L (ref 20–29)
CREAT SERPL-MCNC: 1.06 MG/DL (ref 0.76–1.27)
EGFR: 81 ML/MIN/1.73
EOSINOPHIL # BLD AUTO: 0.1 X10E3/UL (ref 0–0.4)
EOSINOPHIL NFR BLD AUTO: 1 %
ERYTHROCYTE [DISTWIDTH] IN BLOOD BY AUTOMATED COUNT: 12.1 % (ref 11.6–15.4)
GLUCOSE SERPL-MCNC: 333 MG/DL (ref 70–99)
HCT VFR BLD AUTO: 50.3 % (ref 37.5–51)
HGB BLD-MCNC: 16.6 G/DL (ref 13–17.7)
IMM GRANULOCYTES # BLD: 0 X10E3/UL (ref 0–0.1)
IMM GRANULOCYTES NFR BLD: 0 %
LYMPHOCYTES # BLD AUTO: 1.9 X10E3/UL (ref 0.7–3.1)
LYMPHOCYTES NFR BLD AUTO: 17 %
MCH RBC QN AUTO: 29.2 PG (ref 26.6–33)
MCHC RBC AUTO-ENTMCNC: 33 G/DL (ref 31.5–35.7)
MCV RBC AUTO: 88 FL (ref 79–97)
MONOCYTES # BLD AUTO: 0.7 X10E3/UL (ref 0.1–0.9)
MONOCYTES NFR BLD AUTO: 6 %
NEUTROPHILS # BLD AUTO: 7.9 X10E3/UL (ref 1.4–7)
NEUTROPHILS NFR BLD AUTO: 75 %
PLATELET # BLD AUTO: 247 X10E3/UL (ref 150–450)
POTASSIUM SERPL-SCNC: 4.2 MMOL/L (ref 3.5–5.2)
PSA SERPL-MCNC: 1.4 NG/ML (ref 0–4)
RBC # BLD AUTO: 5.69 X10E6/UL (ref 4.14–5.8)
SODIUM SERPL-SCNC: 135 MMOL/L (ref 134–144)
WBC # BLD AUTO: 10.7 X10E3/UL (ref 3.4–10.8)

## 2024-11-27 LAB
TESTOST FREE SERPL-MCNC: 9.3 PG/ML (ref 7.2–24)
TESTOST SERPL-MCNC: 363 NG/DL (ref 264–916)

## 2024-12-10 NOTE — PROGRESS NOTES
Name: Getachew Young      : 1965      MRN: [id number]  Encounter Provider: Daniel Alicea MD  Encounter Date: 2024   Encounter department: [facility] [place]  :  Assessment & Plan  Hyperglycemia  I am concerned that Getachew has diabetes (A1c in .6, recent random glucose of 333)  This could explain some weight loss and blurry vision he has been having  I messaged his PCP about this  I have ordered a repeat A1c as well  Orders:    Hemoglobin A1C; Future    Benign localized prostatic hyperplasia with lower urinary tract symptoms (LUTS)  Some slow stream and intermittency  Does not want meds or surgery at this time  Will survey symptoms for now       Bladder carcinoma (HCC)  High risk bladder cancer s/p resection and BCG  Negative cystoscopy today  ECOG 0  Upper tract imaging negative for lesions  RTC 6 months for follow up and cystoscopy         Papillary renal cell carcinoma (HCC)  S/p left partial nephrectomy  Papillary RCC with concern for tumor spillage at the time of partial  No evidence of disease recurrence         Encounter to discuss test results  I have spent a total time of 30 minutes in caring for this patient on the day of the visit/encounter including Diagnostic results, Prognosis, Risks and benefits of tx options, Instructions for management, Patient and family education, Importance of tx compliance, Risk factor reductions, Impressions, Counseling / Coordination of care, Documenting in the medical record, and Reviewing / ordering tests, medicine, procedures  .              History of Present Illness   Getachew Young is a 59 y.o. male who presents with history of RCC and bladder cancer    ECOG 0    The following portions of the patient's history were reviewed and updated as appropriate: allergies, current medications, past family history, past medical history, past social history, past surgical history and problem list.      Review of Systems   Constitutional:   Positive for unexpected weight change.   HENT: Negative.     Eyes:  Positive for visual disturbance.   Respiratory: Negative.     Cardiovascular: Negative.    Gastrointestinal: Negative.    Endocrine: Negative.    Genitourinary:  Positive for difficulty urinating.   Musculoskeletal: Negative.         Feels swollen in the feet and balls of his feet   Skin: Negative.    Allergic/Immunologic: Negative.    Neurological: Negative.    Hematological: Negative.    Psychiatric/Behavioral: Negative.            Objective   There were no vitals taken for this visit.    Physical Exam  Vitals and nursing note reviewed.   Constitutional:       General: He is not in acute distress.     Appearance: Normal appearance. He is well-developed. He is not ill-appearing, toxic-appearing or diaphoretic.   HENT:      Head: Normocephalic and atraumatic.   Eyes:      General: No scleral icterus.  Pulmonary:      Effort: Pulmonary effort is normal. No respiratory distress.   Abdominal:      General: There is no distension.      Palpations: Abdomen is soft.      Tenderness: There is no abdominal tenderness.   Musculoskeletal:         General: No deformity.      Cervical back: Neck supple.   Skin:     Coloration: Skin is not jaundiced or pale.   Neurological:      Mental Status: He is alert and oriented to person, place, and time. Mental status is at baseline.      Cranial Nerves: No cranial nerve deficit.      Motor: No weakness.      Coordination: Coordination normal.   Psychiatric:         Mood and Affect: Mood normal.         Behavior: Behavior normal.         Thought Content: Thought content normal.         Judgment: Judgment normal.          Results  Lab Results   Component Value Date    PSA 1.4 11/26/2024    PSA 1.7 01/25/2021    PSA 2.6 01/22/2020     Lab Results   Component Value Date    GLUCOSE 104 (H) 02/27/2017    CALCIUM 8.6 11/02/2022     02/27/2017    K 4.2 11/26/2024    CO2 22 11/26/2024    CL 98 11/26/2024    BUN 14  11/26/2024    CREATININE 1.06 11/26/2024     Lab Results   Component Value Date    WBC 10.7 11/26/2024    HGB 16.6 11/26/2024    HCT 50.3 11/26/2024    MCV 88 11/26/2024     11/26/2024       Office Urine Dip  No results found for this or any previous visit (from the past hour).]

## 2024-12-11 ENCOUNTER — TELEPHONE (OUTPATIENT)
Dept: FAMILY MEDICINE CLINIC | Facility: CLINIC | Age: 59
End: 2024-12-11

## 2024-12-11 ENCOUNTER — PROCEDURE VISIT (OUTPATIENT)
Dept: UROLOGY | Facility: CLINIC | Age: 59
End: 2024-12-11
Payer: COMMERCIAL

## 2024-12-11 VITALS
DIASTOLIC BLOOD PRESSURE: 80 MMHG | WEIGHT: 226 LBS | HEIGHT: 71 IN | HEART RATE: 62 BPM | SYSTOLIC BLOOD PRESSURE: 130 MMHG | OXYGEN SATURATION: 94 % | BODY MASS INDEX: 31.64 KG/M2

## 2024-12-11 DIAGNOSIS — N40.1 BENIGN LOCALIZED PROSTATIC HYPERPLASIA WITH LOWER URINARY TRACT SYMPTOMS (LUTS): ICD-10-CM

## 2024-12-11 DIAGNOSIS — C64.9 PAPILLARY RENAL CELL CARCINOMA (HCC): ICD-10-CM

## 2024-12-11 DIAGNOSIS — C67.9 BLADDER CARCINOMA (HCC): Primary | ICD-10-CM

## 2024-12-11 DIAGNOSIS — R73.9 HYPERGLYCEMIA: ICD-10-CM

## 2024-12-11 DIAGNOSIS — Z71.2 ENCOUNTER TO DISCUSS TEST RESULTS: ICD-10-CM

## 2024-12-11 PROCEDURE — 88112 CYTOPATH CELL ENHANCE TECH: CPT | Performed by: STUDENT IN AN ORGANIZED HEALTH CARE EDUCATION/TRAINING PROGRAM

## 2024-12-11 PROCEDURE — 99214 OFFICE O/P EST MOD 30 MIN: CPT | Performed by: UROLOGY

## 2024-12-11 PROCEDURE — 52000 CYSTOURETHROSCOPY: CPT | Performed by: UROLOGY

## 2024-12-11 RX ORDER — LEVOTHYROXINE SODIUM 112 UG/1
1 TABLET ORAL DAILY
COMMUNITY
Start: 2024-11-15

## 2024-12-11 NOTE — ASSESSMENT & PLAN NOTE
High risk bladder cancer s/p resection and BCG  Negative cystoscopy today  ECOG 0  Upper tract imaging negative for lesions  RTC 6 months for follow up and cystoscopy

## 2024-12-11 NOTE — ASSESSMENT & PLAN NOTE
Some slow stream and intermittency  Does not want meds or surgery at this time  Will survey symptoms for now

## 2024-12-11 NOTE — TELEPHONE ENCOUNTER
S/w pt  Advised of prior labs June 2023  Await A1c  If over 7, advised appt to treat DM2  Has lost 20# since not hungry  Has polyuria and polydipsia  ----- Message from Daniel Alicea MD sent at 12/11/2024  8:12 AM EST -----  Hello,    Would you mind seeing Getachew back? His A1c in June was 6.6 and a random glucose recently was 333. I'm concerned he has untreated diabetes.    [place]

## 2024-12-11 NOTE — ASSESSMENT & PLAN NOTE
S/p left partial nephrectomy  Papillary RCC with concern for tumor spillage at the time of partial  No evidence of disease recurrence

## 2024-12-11 NOTE — PROGRESS NOTES
Office Cystoscopy Procedure Note    Indication:    Urothelial carcinoma of the bladder    Informed consent   The risks, benefits, complications, treatment options, and expected outcomes were discussed with the patient. The patient concurred with the proposed plan and provided informed consent.    Anesthesia  Lidocaine jelly 2%    Antibiotic prophylaxis   None    Procedure  The patient was placed in the supineposition, was prepped and draped in the usual manner using sterile technique, and 2% lidocaine jelly instilled into the urethra.  A 17 F flexible cystoscope was then inserted into the urethra and the urethra and bladder carefully examined.  The following findings were noted:    Findings:  Urethra:  Normal  Prostate:  prominent lateral lobe hypertrophy, no median lobe, no lesions  Bladder:  No recurrence, clear mucosa  Ureteral orifices:  orthotopic  Other findings:  None, retroflexed view confirms    Specimens: None                 Complications:    None; patient tolerated the procedure well           Disposition: To home            Condition: Stable    Plan: Negative surveillance cystoscopy, RTC 6 months       Cystoscopy     Date/Time  12/11/2024 8:00 AM     Performed by  Daniel Alicea MD   Authorized by  Daniel Alicea MD     Universal Protocol:  procedure performed by consultantConsent: Verbal consent obtained. Written consent obtained.  Risks and benefits: risks, benefits and alternatives were discussed  Consent given by: patient  Patient understanding: patient states understanding of the procedure being performed  Patient consent: the patient's understanding of the procedure matches consent given  Procedure consent: procedure consent matches procedure scheduled  Relevant documents: relevant documents present and verified  Test results: test results available and properly labeled  Site marked: the operative site was not marked  Radiology Images displayed and confirmed. If images not available, report  reviewed: imaging studies available  Required items: required blood products, implants, devices, and special equipment available  Patient identity confirmed: verbally with patient and provided demographic data      Procedure Details:  Procedure type: cystoscopy    Patient tolerance: Patient tolerated the procedure well with no immediate complications    Additional Procedure Details: Office Cystoscopy Procedure Note    Indication:    Urothelial carcinoma of the bladder    Informed consent   The risks, benefits, complications, treatment options, and expected outcomes were discussed with the patient. The patient concurred with the proposed plan and provided informed consent.    Anesthesia  Lidocaine jelly 2%    Antibiotic prophylaxis   None    Procedure  The patient was placed in the supineposition, was prepped and draped in the usual manner using sterile technique, and 2% lidocaine jelly instilled into the urethra.  A 17 F flexible cystoscope was then inserted into the urethra and the urethra and bladder carefully examined.  The following findings were noted:    Findings:  Urethra:  Normal  Prostate:  prominent lateral lobe hypertrophy, no median lobe, no lesions  Bladder:  No recurrence, clear mucosa  Ureteral orifices:  orthotopic  Other findings:  None, retroflexed view confirms    Specimens: None                 Complications:    None; patient tolerated the procedure well           Disposition: To home            Condition: Stable    Plan: Negative surveillance cystoscopy, RTC 6 months

## 2024-12-11 NOTE — ASSESSMENT & PLAN NOTE
I am concerned that Getachew has diabetes (A1c in June 6.6, recent random glucose of 333)  This could explain some weight loss and blurry vision he has been having  I messaged his PCP about this  I have ordered a repeat A1c as well  Orders:    Hemoglobin A1C; Future

## 2024-12-12 ENCOUNTER — RESULTS FOLLOW-UP (OUTPATIENT)
Dept: OTHER | Facility: HOSPITAL | Age: 59
End: 2024-12-12

## 2024-12-12 LAB
EST. AVERAGE GLUCOSE BLD GHB EST-MCNC: >398 MG/DL
HBA1C MFR BLD: >15.5 % (ref 4.8–5.6)

## 2024-12-12 NOTE — TELEPHONE ENCOUNTER
Patient called states hemoglobin A 1 C results are in. Did schedule appointment for 12/23/2024 at 11:30 am, if needs to be seen sooner Please call patient to reschedule

## 2024-12-13 NOTE — TELEPHONE ENCOUNTER
S/w  A1c over 15  Advised of medication options to start investigating with insurance coverage  Also look into coverage of test strip brand vs CGM  Also go to [employer] for DM diet education session

## 2024-12-16 ENCOUNTER — RA CDI HCC (OUTPATIENT)
Dept: OTHER | Facility: HOSPITAL | Age: 59
End: 2024-12-16

## 2024-12-16 PROCEDURE — 88112 CYTOPATH CELL ENHANCE TECH: CPT | Performed by: STUDENT IN AN ORGANIZED HEALTH CARE EDUCATION/TRAINING PROGRAM

## 2024-12-16 NOTE — PROGRESS NOTES
HCC coding opportunities       Chart reviewed, no opportunity found: CHART REVIEWED, NO OPPORTUNITY FOUND        Patients Insurance        Commercial Insurance: [employer]

## 2024-12-17 NOTE — TELEPHONE ENCOUNTER
Patient called in wanting to know if he could repeat his A1c because he did not fast for it. I advised the patient the A1c does not require fasting. No further assistance needed at this time.

## 2025-01-16 ENCOUNTER — RA CDI HCC (OUTPATIENT)
Dept: OTHER | Facility: HOSPITAL | Age: 60
End: 2025-01-16

## 2025-01-16 NOTE — PROGRESS NOTES
HCC coding opportunities       Chart reviewed, no opportunity found: CHART REVIEWED, NO OPPORTUNITY FOUND        Patients Insurance        Commercial Insurance: [employer]

## 2025-01-23 ENCOUNTER — OFFICE VISIT (OUTPATIENT)
Dept: FAMILY MEDICINE CLINIC | Facility: CLINIC | Age: 60
End: 2025-01-23
Payer: COMMERCIAL

## 2025-01-23 DIAGNOSIS — E78.49 OTHER HYPERLIPIDEMIA: ICD-10-CM

## 2025-01-23 DIAGNOSIS — C64.9 PAPILLARY RENAL CELL CARCINOMA (HCC): ICD-10-CM

## 2025-01-23 DIAGNOSIS — I11.9 HYPERTENSIVE HEART DISEASE WITHOUT CONGESTIVE HEART FAILURE: ICD-10-CM

## 2025-01-23 DIAGNOSIS — Q27.9 VENOUS ANOMALY: ICD-10-CM

## 2025-01-23 DIAGNOSIS — R73.09 ELEVATED GLUCOSE: Primary | ICD-10-CM

## 2025-01-23 PROCEDURE — 99214 OFFICE O/P EST MOD 30 MIN: CPT | Performed by: FAMILY MEDICINE

## 2025-01-23 NOTE — PROGRESS NOTES
Assessment & Plan           History of Present Illness {?Quick Links Encounters * My Last Note * Last Note in Specialty * Snapshot * Since Last Visit * History :40414}  HPI  This am 110  Predinner last pm 129  Fbs 120s usually,  88 one night, w/o meds    Vision issues    Toes feels  1m  Numbn  No tingling    Eat much better  Lost 25# in past   Gained 3-5#      Review of Systems    Current Outpatient Medications   Medication Instructions    levothyroxine 112 mcg tablet 1 tablet, Daily    losartan (COZAAR) 50 mg, Daily    losartan (COZAAR) 25 mg, Daily    rosuvastatin (CRESTOR) 20 mg, Every morning       Social History     Tobacco Use    Smoking status: Former     Current packs/day: 0.00     Average packs/day: 2.0 packs/day for 40.0 years (80.0 ttl pk-yrs)     Types: Cigarettes     Start date: 1976     Quit date: 2016     Years since quittin.7    Smokeless tobacco: Never   Vaping Use    Vaping status: Never Used   Substance Use Topics    Alcohol use: Yes     Comment: rare    Drug use: No        No family history on file.     Objective {?Quick Links Trend Vitals * Enter New Vitals * Results Review * Timeline (Adult) * Labs * Imaging * Cardiology * Procedures * Lung Cancer Screening * Surgical eConsent :07315}  There were no vitals taken for this visit.     Physical Exam  {Administrative / Billing Section (Optional):21617}

## 2025-01-23 NOTE — PATIENT INSTRUCTIONS
As a Type 2 Diabetic, it would be informative to check and record your blood sugars at home to help manage sugar control and identify any fluctuations that may be due to diet/exercise.  You can check your sugar ONCE A DAY at FOUR DIFFERENT times in an alternating fashion: (1) before BREAKFAST, then the next day (2) before LUNCH, the following day (3) before DINNER, and the following day (4) before BEDTIME (then start over again).   Identifying trends may help you determine if changes are needed to what you eat, when you eat it, your activity level, and your medications.  You should bring this records with you to your Diabetes follow-up appointments every 3 months.

## 2025-01-24 VITALS
TEMPERATURE: 96.3 F | BODY MASS INDEX: 31.36 KG/M2 | RESPIRATION RATE: 16 BRPM | WEIGHT: 224 LBS | HEART RATE: 72 BPM | SYSTOLIC BLOOD PRESSURE: 138 MMHG | DIASTOLIC BLOOD PRESSURE: 70 MMHG | HEIGHT: 71 IN

## 2025-01-24 NOTE — PROGRESS NOTES
"Name: Getachew Young      : 1965      MRN: [id number]  Encounter Provider: Sekou Carranza DO  Encounter Date: 2025   Encounter department: [facility]  :  Assessment & Plan  Elevated glucose  Much better by smbg  Hold off on meds  Recheck a1c  Orders:    Comprehensive metabolic panel; Future    Lipid Panel with Direct LDL reflex; Future    Hemoglobin A1C; Future    Albumin / creatinine urine ratio; Future    Papillary renal cell carcinoma (HCC)  F/u urology       Venous anomaly         Hypertensive heart disease without congestive heart failure  stable       Other hyperlipidemia  Stable on statin  LDL 89              History of Present Illness   HPI  This am 110  Predinner last pm 129  Fbs 120s usually,  88 one night, w/o meds    Vision issues    Toes feels  1m  Numbn  No tingling    Eat much better  Lost 25# in past   Gained 3-5# back    Review of Systems   Respiratory:  Negative for shortness of breath.    Cardiovascular:  Negative for chest pain.     Family History   Problem Relation Age of Onset    Diabetes Mother       Social History     Tobacco Use    Smoking status: Former     Current packs/day: 0.00     Average packs/day: 2.0 packs/day for 40.0 years (80.0 ttl pk-yrs)     Types: Cigarettes     Start date: 1976     Quit date: 2016     Years since quittin.7    Smokeless tobacco: Never   Vaping Use    Vaping status: Never Used   Substance Use Topics    Alcohol use: Yes     Comment: rare    Drug use: No      Current Outpatient Medications   Medication Instructions    levothyroxine 112 mcg tablet 1 tablet, Daily    losartan (COZAAR) 50 mg, Daily    losartan (COZAAR) 25 mg, Daily    rosuvastatin (CRESTOR) 20 mg, Every morning       Visit Vitals  /70   Pulse 72   Temp (!) 96.3 °F (35.7 °C)   Resp 16   Ht 5' 11\" (1.803 m)   Wt 102 kg (224 lb)   BMI 31.24 kg/m²   Smoking Status Former   BSA 2.22 m²          Objective   /70   Pulse 72   Temp (!) 96.3 °F (35.7 " "°C)   Resp 16   Ht 5' 11\" (1.803 m)   Wt 102 kg (224 lb)   BMI 31.24 kg/m²      Physical Exam  Vitals and nursing note reviewed.   Constitutional:       General: He is not in acute distress.     Appearance: He is well-developed. He is not ill-appearing.   HENT:      Head: Normocephalic.   Eyes:      General: No scleral icterus.     Conjunctiva/sclera: Conjunctivae normal.   Cardiovascular:      Rate and Rhythm: Normal rate and regular rhythm.      Pulses: no weak pulses.           Dorsalis pedis pulses are 2+ on the right side and 2+ on the left side.      Heart sounds: No murmur heard.  Pulmonary:      Effort: Pulmonary effort is normal. No respiratory distress.      Breath sounds: No wheezing.   Abdominal:      Palpations: Abdomen is soft.      Tenderness: There is no abdominal tenderness.   Musculoskeletal:         General: No deformity.      Cervical back: Neck supple.   Skin:     General: Skin is warm and dry.      Coloration: Skin is not jaundiced or pale.   Neurological:      Mental Status: He is alert.      Motor: No weakness.   Psychiatric:         Mood and Affect: Mood normal.         Behavior: Behavior normal.         Thought Content: Thought content normal.     Diabetic Foot Exam    Patient's shoes and socks removed.    Right Foot/Ankle   Right Foot Inspection  Skin Exam: skin intact. No abnormal color.     Toe Exam:  no right toe deformity    Sensory   Monofilament testing: intact    Vascular  The right DP pulse is 2+.     Left Foot/Ankle  Left Foot Inspection  Skin Exam: skin intact. Normal color.     Toe Exam: No left toe deformity.     Sensory   Monofilament testing: intact    Vascular  The left DP pulse is 2+.     Assign Risk Category  No deformity present  No loss of protective sensation  No weak pulses  Risk: 0      "

## 2025-02-06 ENCOUNTER — TELEPHONE (OUTPATIENT)
Dept: OBGYN CLINIC | Facility: CLINIC | Age: 60
End: 2025-02-06

## 2025-02-06 ENCOUNTER — APPOINTMENT (OUTPATIENT)
Dept: RADIOLOGY | Facility: CLINIC | Age: 60
End: 2025-02-06
Payer: COMMERCIAL

## 2025-02-06 ENCOUNTER — OFFICE VISIT (OUTPATIENT)
Dept: OBGYN CLINIC | Facility: CLINIC | Age: 60
End: 2025-02-06
Payer: COMMERCIAL

## 2025-02-06 VITALS — WEIGHT: 230 LBS | BODY MASS INDEX: 32.2 KG/M2 | HEIGHT: 71 IN

## 2025-02-06 DIAGNOSIS — M75.21 BICEPS TENDINITIS OF RIGHT SHOULDER: Primary | ICD-10-CM

## 2025-02-06 DIAGNOSIS — M25.511 RIGHT SHOULDER PAIN, UNSPECIFIED CHRONICITY: ICD-10-CM

## 2025-02-06 PROCEDURE — 99203 OFFICE O/P NEW LOW 30 MIN: CPT | Performed by: ORTHOPAEDIC SURGERY

## 2025-02-06 PROCEDURE — 73030 X-RAY EXAM OF SHOULDER: CPT

## 2025-02-06 NOTE — PROGRESS NOTES
Patient Name: Getachew Young      : 1965       MRN: [id number]   Encounter Provider: Barrett Love MD   Encounter Date: 25  Encounter department: [facility] [place]         Assessment & Plan  Right shoulder pain, unspecified chronicity    Orders:    XR shoulder 2+ vw right; Future    Biceps tendinitis of right shoulder    Orders:    Ambulatory Referral to Physical Therapy; Future         Plan:   - Patient has bicipital groove tenderness on exam with otherwise well-maintained range of motion, strength, and muscle contour. In the setting of atraumatic shoulder pain for 6 weeks without any interventions attempted so far, we discussed the diagnosis and treatment plan, including formal PT versus physician-directed HEP, referral to Dr. Lindsay for U/S-guided biceps tendon sheath steroid injection, and OTC medications as needed. We discussed using NSAIDs consistently to help with anti-inflammatory effects.  - Patient prefers to try HEP for now, but a referral to PT was placed in case he decides to go formally. He will consider the injection with Dr. Lindsay if symptoms do not improve over the coming weeks. He can send a message for a referral if needed.  - Plan to follow up in 6 weeks for recheck. Continue activities as tolerated, using pain as a guide in the meantime.      _____________________________________________________  CHIEF COMPLAINT:  Chief Complaint   Patient presents with    Right Shoulder - Pain     Numbness in fingers at times.         SUBJECTIVE:  Getachew Young is a 59 y.o. right hand dominant male who presents for initial evaluation of atraumatic right shoulder pain primarily localized to the anterior aspect for the past 6 weeks. The patient notes pain was initially intermittent, but has gradually increased such that pain is nearly constant. The patient notes pain and limited mobility with shoulder abduction with the elbow flexed, but denies  significant issues with shoulder abduction with the elbow extended and forward flexion. 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 The patient denies history of shoulder dysfunction, weakness, neck pain, muscle deformity, or bruising. The patient also notes sleep disturbances secondary to pain and occasionally wakes up with numbness/tingling in the hand, though this seems to be completely separate from the shoulder pain He has not used anything consistently for pain control so far.    Right Shoulder Surgical History:    PAST MEDICAL HISTORY:  Past Medical History:   Diagnosis Date    Cancer (HCC) 2017    bladder    Hyperlipidemia     Hypertension        PAST SURGICAL HISTORY:  Past Surgical History:   Procedure Laterality Date    CHOLECYSTECTOMY      FL RETROGRADE PYELOGRAM  11/2/2022    HERNIA REPAIR      bilateral linguinal mesh    NASAL SEPTUM SURGERY      NEUROPLASTY / TRANSPOSITION ULNAR NERVE AT ELBOW Left     PARTIAL NEPHRECTOMY      [place] COLONOSCOPY FLX DX W/COLLJ SPEC WHEN PFRMD N/A 03/11/2016    Procedure: COLONOSCOPY;  Surgeon: James Khan MD;  Location: [facility] GI LAB;  Service: Gastroenterology    [place] CYSTOURETHROSCOPY W/DEST &/RMVL TUMOR LARGE N/A 11/2/2022    Procedure: TRANSURETHRAL RESECTION OF BLADDER TUMOR (TURBT), INSTILLATION OF GEMCITABINE;  Surgeon: Vikki Scott MD;  Location: [facility];  Service: Urology    NC CYSTOURETHROSCOPY W/URETERAL CATHETERIZATION Bilateral 11/2/2022    Procedure: CYSTOSCOPY WITH RETROGRADE PYELOGRAM;  Surgeon: Vikki Scott MD;  Location: [facility];  Service: Urology    US GUIDED THYROID BIOPSY  7/11/2023       FAMILY HISTORY:  Family History   Problem Relation Age of Onset    Diabetes Mother        SOCIAL HISTORY:  Social History     Tobacco Use    Smoking status: Former     Current packs/day: 0.00     Average packs/day: 2.0 packs/day for 40.0 years (80.0 ttl pk-yrs)     Types: Cigarettes     Start  "date: 1976     Quit date: 2016     Years since quittin.7    Smokeless tobacco: Never   Vaping Use    Vaping status: Never Used   Substance Use Topics    Alcohol use: Yes     Comment: rare    Drug use: No       MEDICATIONS:    Current Outpatient Medications:     levothyroxine 112 mcg tablet, Take 1 tablet by mouth in the morning, Disp: , Rfl:     losartan (COZAAR) 25 mg tablet, Take 25 mg by mouth daily, Disp: , Rfl:     losartan (COZAAR) 50 mg tablet, Take 50 mg by mouth daily, Disp: , Rfl:     rosuvastatin (CRESTOR) 20 MG tablet, Take 20 mg by mouth every morning, Disp: , Rfl:     ALLERGIES:  No Known Allergies    LABS:  HgA1c:   Lab Results   Component Value Date    HGBA1C >15.5 (H) 2024     BMP:   Lab Results   Component Value Date    GLUCOSE 104 (H) 2017    CALCIUM 8.6 2022     2017    K 4.2 2024    CO2 22 2024    CL 98 2024    BUN 14 2024    CREATININE 1.06 2024     CBC: No components found for: \"CBC\"    _____________________________________________________  Review of systems: ROS is negative other than that noted in the HPI.  Constitutional: Negative for fatigue and fever.   HENT: Negative for sore throat.    Respiratory: Negative for shortness of breath.    Cardiovascular: Negative for chest pain.   Gastrointestinal: Negative for abdominal pain.   Endocrine: Negative for cold intolerance and heat intolerance.   Genitourinary: Negative for flank pain.   Musculoskeletal: Negative for back pain.   Skin: Negative for rash.   Allergic/Immunologic: Negative for immunocompromised state.   Neurological: Negative for dizziness.   Psychiatric/Behavioral: Negative for agitation.     Shoulder Exam:     Inspection: No ecchymosis, edema, or deformity. No visualized wounds or skin lesions   Palpation: moderate bicipital groove tenderness. Mild AC joint tenderness  Active Motion:       FF: 180, symmetric        ER: 70, symmetric        IR: T12, " symmetric  Strength: 5/5 empty can, 5/5 ER,  5/5 IR   Sensory - SILT in the Radial / Ulnar / Median / Axillary nerve distributions  Motor - AIN / PIN / Radial / Ulnar / Median / Axillary motor nerves in tact  Palpable Radial pulse  Cap refill <2secs in all digits    5/5 Belly Press    Negative Speed     Physical exam:  General/Constitutional: NAD, well developed, well nourished  HENT: Normocephalic, atraumatic  CV: Intact distal pulses, regular rate  Resp: No respiratory distress or labored breathing  Abdomen: soft, nondistended   Lymphatic: No lymphadenopathy palpated  Neuro: Alert and Oriented x 3, no focal deficits  Psych: Normal mood, normal affect  Skin: Warm, dry, no rashes, no erythema  _____________________________________________________  STUDIES REVIEWED:  X-rays of the right shoulder reviewed and interpreted today. These show no acute osseous abnormalities. Mild AC and GH joint degenerative changes. Humerus is well-centered on the glenoid.       PROCEDURES PERFORMED:  No procedures performed    Scribe Attestation      I,:  Luisa Philippe PA-C am acting as a scribe while in the presence of the attending physician.:       I,:  Barrett Love MD personally performed the services described in this documentation    as scribed in my presence.:

## 2025-03-10 ENCOUNTER — TELEPHONE (OUTPATIENT)
Age: 60
End: 2025-03-10

## 2025-03-10 NOTE — TELEPHONE ENCOUNTER
Call from patient advising that yesterday he started having sinus pressure/headache/upper respiratory problems. He is requesting medication, either OTC or script. He declined appt at time of call and asked I send message to Dr. Carranza. Please call him to discuss. Thank you.

## 2025-04-25 LAB
ALBUMIN SERPL-MCNC: 4.5 G/DL (ref 3.8–4.9)
ALBUMIN/CREAT UR: 9 MG/G CREAT (ref 0–29)
ALP SERPL-CCNC: 60 IU/L (ref 44–121)
ALT SERPL-CCNC: 18 IU/L (ref 0–44)
AST SERPL-CCNC: 19 IU/L (ref 0–40)
BILIRUB SERPL-MCNC: 0.4 MG/DL (ref 0–1.2)
BUN SERPL-MCNC: 19 MG/DL (ref 6–24)
BUN/CREAT SERPL: 16 (ref 9–20)
CALCIUM SERPL-MCNC: 9.6 MG/DL (ref 8.7–10.2)
CHLORIDE SERPL-SCNC: 106 MMOL/L (ref 96–106)
CHOLEST SERPL-MCNC: 141 MG/DL (ref 100–199)
CO2 SERPL-SCNC: 23 MMOL/L (ref 20–29)
CREAT SERPL-MCNC: 1.2 MG/DL (ref 0.76–1.27)
CREAT UR-MCNC: 111.5 MG/DL
EGFR: 70 ML/MIN/1.73
EST. AVERAGE GLUCOSE BLD GHB EST-MCNC: 140 MG/DL
GLOBULIN SER-MCNC: 2 G/DL (ref 1.5–4.5)
GLUCOSE SERPL-MCNC: 118 MG/DL (ref 70–99)
HBA1C MFR BLD: 6.5 % (ref 4.8–5.6)
HDLC SERPL-MCNC: 50 MG/DL
LDLC SERPL CALC-MCNC: 73 MG/DL (ref 0–99)
LDLC/HDLC SERPL: 1.5 RATIO (ref 0–3.6)
MICROALBUMIN UR-MCNC: 10.1 UG/ML
MICRODELETION SYND BLD/T FISH: NORMAL
POTASSIUM SERPL-SCNC: 4.8 MMOL/L (ref 3.5–5.2)
PROT SERPL-MCNC: 6.5 G/DL (ref 6–8.5)
SL AMB VLDL CHOLESTEROL CALC: 18 MG/DL (ref 5–40)
SODIUM SERPL-SCNC: 144 MMOL/L (ref 134–144)
TRIGL SERPL-MCNC: 98 MG/DL (ref 0–149)

## 2025-04-26 ENCOUNTER — RESULTS FOLLOW-UP (OUTPATIENT)
Dept: FAMILY MEDICINE CLINIC | Facility: CLINIC | Age: 60
End: 2025-04-26

## 2025-04-30 ENCOUNTER — RA CDI HCC (OUTPATIENT)
Dept: OTHER | Facility: HOSPITAL | Age: 60
End: 2025-04-30

## 2025-04-30 NOTE — PROGRESS NOTES
HCC coding opportunities       Chart reviewed, no opportunity found: CHART REVIEWED, NO OPPORTUNITY FOUND        Patients Insurance        Commercial Insurance: [employer] Insurance

## 2025-05-22 ENCOUNTER — OFFICE VISIT (OUTPATIENT)
Dept: OBGYN CLINIC | Facility: CLINIC | Age: 60
End: 2025-05-22
Payer: COMMERCIAL

## 2025-05-22 ENCOUNTER — APPOINTMENT (OUTPATIENT)
Dept: RADIOLOGY | Facility: CLINIC | Age: 60
End: 2025-05-22
Attending: ORTHOPAEDIC SURGERY
Payer: COMMERCIAL

## 2025-05-22 VITALS — BODY MASS INDEX: 32.76 KG/M2 | HEIGHT: 71 IN | WEIGHT: 234 LBS

## 2025-05-22 DIAGNOSIS — M25.512 ACUTE PAIN OF LEFT SHOULDER: Primary | ICD-10-CM

## 2025-05-22 DIAGNOSIS — M25.512 LEFT SHOULDER PAIN, UNSPECIFIED CHRONICITY: ICD-10-CM

## 2025-05-22 DIAGNOSIS — M75.21 BICEPS TENDONITIS OF BOTH SHOULDERS: ICD-10-CM

## 2025-05-22 DIAGNOSIS — M75.22 BICEPS TENDONITIS OF BOTH SHOULDERS: ICD-10-CM

## 2025-05-22 PROCEDURE — 99213 OFFICE O/P EST LOW 20 MIN: CPT | Performed by: ORTHOPAEDIC SURGERY

## 2025-05-22 PROCEDURE — 73030 X-RAY EXAM OF SHOULDER: CPT

## 2025-05-22 NOTE — ASSESSMENT & PLAN NOTE
Orders:    XR shoulder 2+ vw left; Future    Ambulatory Referral to Orthopedic Surgery; Future

## 2025-05-22 NOTE — PROGRESS NOTES
Patient Name: Getachew Young      : 1965       MRN: [id number]   Encounter Provider: Barrett Love MD   Encounter Date: 25  Encounter department: [facility] [place]         Assessment & Plan  Biceps tendonitis of both shoulders  Patient was last seen 25 for right shoulder bicep tendonitis, and provided HEP. No significant improvement. Today he is symptomatic of left shoulder bicep tendonitis as well. Patient has good strength on exam, I am not concerned for RTC tear. At this time I would recommend formal physical therapy for bilateral shoulders and a referral to my partner Dr Lindsay for US guided CSI. Patient was amenable to this. He will follow up with me 6 weeks after the injection.   Orders:    Ambulatory Referral to Orthopedic Surgery; Future    Ambulatory Referral to Physical Therapy; Future    Acute pain of left shoulder    Orders:    XR shoulder 2+ vw left; Future    Ambulatory Referral to Orthopedic Surgery; Future           Follow-up:  No follow-ups on file.     _____________________________________________________  CHIEF COMPLAINT:  Chief Complaint   Patient presents with    Left Shoulder - Pain         SUBJECTIVE:  Getachew Young is a 59 y.o. male who presents for initial evaluation of left shoulder pain. History of right shoulder bicep tendonitis, last seen 25. Patient states the pain to his left shoulder feels similar. Denies injury or trauma. Pain is localized to the anterior shoulder, aggravated with activity and alleviated with rest and aleve. Pain is rated 3/10 today. He denies any significant limitations in motion or strength.      Left shoulder Surgical History: None    PAST MEDICAL HISTORY:  Past Medical History[1]    PAST SURGICAL HISTORY:  Past Surgical History[2]    FAMILY HISTORY:  Family History[3]    SOCIAL HISTORY:  Social History[4]    MEDICATIONS:  Current Medications[5]    ALLERGIES:  Allergies[6]    LABS:  HgA1c:   Lab  "Results   Component Value Date    HGBA1C 6.5 (H) 04/24/2025     BMP:   Lab Results   Component Value Date    GLUCOSE 104 (H) 02/27/2017    CALCIUM 8.6 11/02/2022     02/27/2017    K 4.8 04/24/2025    CO2 23 04/24/2025     04/24/2025    BUN 19 04/24/2025    CREATININE 1.20 04/24/2025     CBC: No components found for: \"CBC\"    _____________________________________________________  Review of systems: ROS is negative other than that noted in the HPI.  Constitutional: Negative for fatigue and fever.   HENT: Negative for sore throat.    Respiratory: Negative for shortness of breath.    Cardiovascular: Negative for chest pain.   Gastrointestinal: Negative for abdominal pain.   Endocrine: Negative for cold intolerance and heat intolerance.   Genitourinary: Negative for flank pain.   Musculoskeletal: Negative for back pain.   Skin: Negative for rash.   Allergic/Immunologic: Negative for immunocompromised state.   Neurological: Negative for dizziness.   Psychiatric/Behavioral: Negative for agitation.     Shoulder Exam:     Inspection: No ecchymosis, edema, or deformity. No visualized wounds or skin lesions   Palpation: TTP bicep tendon  Active Motion:       FF: 180        ER: 60        IR: T12  Strength: 5/5 empty can, 5/5 ER,  5/5 IR   Sensory - SILT in the Radial / Ulnar / Median / Axillary nerve distributions  Motor - AIN / PIN / Radial / Ulnar / Median / Axillary motor nerves in tact  Palpable Radial pulse  Cap refill <2secs in all digits    (-) empty can   (-) [place]  (+) Speed   (-) belly press  (-) bear hug     Physical exam:  General/Constitutional: NAD, well developed, well nourished  HENT: Normocephalic, atraumatic  CV: Intact distal pulses, regular rate  Resp: No respiratory distress or labored breathing  Abdomen: soft, nondistended   Lymphatic: No lymphadenopathy palpated  Neuro: Alert and Oriented x 3, no focal deficits  Psych: Normal mood, normal affect  Skin: Warm, dry, no rashes, no " erythema  _____________________________________________________  STUDIES REVIEWED:  X-rays of the left shoulder reviewed and interpreted today. These show no acute osseous abnormalities       PROCEDURES PERFORMED:  No Procedures performed today    Scribe Attestation      I,:  Berta Victor am acting as a scribe while in the presence of the attending physician.:       I,:  Barrett Love MD personally performed the services described in this documentation    as scribed in my presence.:                     [1]   Past Medical History:  Diagnosis Date    Cancer (HCC) 2017    bladder    Hyperlipidemia     Hypertension    [2]   Past Surgical History:  Procedure Laterality Date    CHOLECYSTECTOMY      FL RETROGRADE PYELOGRAM  2022    HERNIA REPAIR      bilateral linguinal mesh    NASAL SEPTUM SURGERY      NEUROPLASTY / TRANSPOSITION ULNAR NERVE AT ELBOW Left     PARTIAL NEPHRECTOMY      [place] COLONOSCOPY FLX DX W/COLLJ SPEC WHEN PFRMD N/A 2016    Procedure: COLONOSCOPY;  Surgeon: James Khan MD;  Location: [facility] GI LAB;  Service: Gastroenterology    SC CYSTOURETHROSCOPY W/DEST &/RMVL TUMOR LARGE N/A 2022    Procedure: TRANSURETHRAL RESECTION OF BLADDER TUMOR (TURBT), INSTILLATION OF GEMCITABINE;  Surgeon: Vikki Scott MD;  Location: [facility] OR;  Service: Urology    SC CYSTOURETHROSCOPY W/URETERAL CATHETERIZATION Bilateral 2022    Procedure: CYSTOSCOPY WITH RETROGRADE PYELOGRAM;  Surgeon: Vikki Scott MD;  Location: [facility];  Service: Urology    US GUIDED THYROID BIOPSY  2023   [3]   Family History  Problem Relation Name Age of Onset    Diabetes Mother     [4]   Social History  Tobacco Use    Smoking status: Former     Current packs/day: 0.00     Average packs/day: 2.0 packs/day for 40.0 years (80.0 ttl pk-yrs)     Types: Cigarettes     Start date: 1976     Quit date: 2016     Years since quittin.0    Smokeless tobacco: Never   Vaping Use    Vaping status:  Never Used   Substance Use Topics    Alcohol use: Yes     Comment: rare    Drug use: No   [5]   Current Outpatient Medications:     levothyroxine 125 mcg tablet, Take 125 mcg by mouth in the morning., Disp: , Rfl:     losartan (COZAAR) 25 mg tablet, Take 25 mg by mouth in the morning., Disp: , Rfl:     rosuvastatin (CRESTOR) 20 MG tablet, Take 20 mg by mouth every morning, Disp: , Rfl:   [6] No Known Allergies

## 2025-05-22 NOTE — ASSESSMENT & PLAN NOTE
Patient was last seen 2/6/25 for right shoulder bicep tendonitis, and provided HEP. No significant improvement. Today he is symptomatic of left shoulder bicep tendonitis as well. Patient has good strength on exam, I am not concerned for RTC tear. At this time I would recommend formal physical therapy for bilateral shoulders and a referral to my partner Dr Lindsay for US guided CSI. Patient was amenable to this. He will follow up with me 6 weeks after the injection.   Orders:    Ambulatory Referral to Orthopedic Surgery; Future    Ambulatory Referral to Physical Therapy; Future

## 2025-05-30 ENCOUNTER — PROCEDURE VISIT (OUTPATIENT)
Dept: OBGYN CLINIC | Facility: CLINIC | Age: 60
End: 2025-05-30
Payer: COMMERCIAL

## 2025-05-30 VITALS — HEIGHT: 71 IN | BODY MASS INDEX: 32.76 KG/M2 | WEIGHT: 234 LBS

## 2025-05-30 DIAGNOSIS — M75.21 BICEPS TENDINITIS OF RIGHT SHOULDER: Primary | ICD-10-CM

## 2025-05-30 PROCEDURE — 20611 DRAIN/INJ JOINT/BURSA W/US: CPT | Performed by: ORTHOPAEDIC SURGERY

## 2025-05-30 PROCEDURE — 99203 OFFICE O/P NEW LOW 30 MIN: CPT | Performed by: ORTHOPAEDIC SURGERY

## 2025-05-30 RX ORDER — TRIAMCINOLONE ACETONIDE 40 MG/ML
40 INJECTION, SUSPENSION INTRA-ARTICULAR; INTRAMUSCULAR
Status: COMPLETED | OUTPATIENT
Start: 2025-05-30 | End: 2025-05-30

## 2025-05-30 RX ORDER — LIDOCAINE HYDROCHLORIDE 10 MG/ML
2 INJECTION, SOLUTION INFILTRATION; PERINEURAL
Status: COMPLETED | OUTPATIENT
Start: 2025-05-30 | End: 2025-05-30

## 2025-05-30 RX ADMIN — TRIAMCINOLONE ACETONIDE 40 MG: 40 INJECTION, SUSPENSION INTRA-ARTICULAR; INTRAMUSCULAR at 08:30

## 2025-05-30 RX ADMIN — LIDOCAINE HYDROCHLORIDE 2 ML: 10 INJECTION, SOLUTION INFILTRATION; PERINEURAL at 08:30

## 2025-05-30 NOTE — LETTER
"May 30, 2025     Barrett Love MD  [address] [facility] [address]    Patient: Getachew Young   YOB: 1965   Date of Visit: 2025       Dear Dr. Barrett Love MD:    Thank you for referring Getachew Young to me for evaluation. Below are my notes for this consultation.    If you have questions, please do not hesitate to call me. I look forward to following your patient along with you.         Sincerely,        Lupillo Lindasy DO        CC: No Recipients    Lupillo Lindsay DO  2025  9:14 AM  Sign when Signing Visit  Patient Name: Getachew Young      : 1965       MRN: [id number]   Encounter Provider: Lupillo Lindsay DO   Encounter Date: 25  Encounter department: [facility]         Assessment & Plan  Biceps tendinitis of right shoulder  Getachew has right-sided shoulder pain consistent withbiceps tendinitis.  He did have fluid surrounding the biceps tendon sheath as well as additional fluid which appeared to be along the subscapularis tendon under ultrasound.  He tolerated ultrasound-guided biceps injection today and hopefully this gives him the relief coming forward.  He was counseled to monitor his blood sugar in the next 24 hours as well as return in 2 weeks for opposite shoulder injection.                Follow-up:  No follow-ups on file.     _____________________________________________________  CHIEF COMPLAINT:  Chief Complaint   Patient presents with   • Right Shoulder - Pain       Height 5' 11\" (1.803 m), weight 106 kg (234 lb).         SUBJECTIVE:  Getachew Young is a 59 y.o. male who presents to the office today for an ultrasound-guided biceps tendon sheath injection of the right shoulder.  He was referred by Dr. Love after recent evaluation.  He works a job with repetitive lifting of signs and sheet-metal.  He denies any recent trauma.  He has similar pain in the left side and is scheduled for an " ultrasound-guided biceps injection in 2 weeks.    PAST MEDICAL HISTORY:  Past Medical History[1]    PAST SURGICAL HISTORY:  Past Surgical History[2]    FAMILY HISTORY:  Family History[3]    SOCIAL HISTORY:  Social History[4]    MEDICATIONS:  Current Medications[5]    ALLERGIES:  Allergies[6]      _____________________________________________________  Review of Systems   Constitutional:  Negative for chills, fever and unexpected weight change.   HENT:  Negative for hearing loss, nosebleeds and sore throat.    Eyes:  Negative for pain, redness and visual disturbance.   Respiratory:  Negative for cough, shortness of breath and wheezing.    Cardiovascular:  Negative for chest pain, palpitations and leg swelling.   Gastrointestinal:  Negative for abdominal pain, nausea and vomiting.   Endocrine: Negative for polyphagia and polyuria.   Genitourinary:  Negative for dysuria and hematuria.   Musculoskeletal:         See HPI   Skin:  Negative for rash and wound.   Neurological:  Negative for dizziness, numbness and headaches.   Psychiatric/Behavioral:  Negative for decreased concentration and suicidal ideas. The patient is not nervous/anxious.         Right Shoulder Exam     Tenderness   The patient is experiencing tenderness in the biceps tendon.    Range of Motion   Active abduction:  normal   Passive abduction:  normal   Extension:  normal   External rotation:  normal   Forward flexion:  normal     Other   Erythema: absent  Sensation: normal  Pulse: present             Physical Exam  Vitals reviewed.   Constitutional:       Appearance: He is well-developed.   HENT:      Head: Normocephalic and atraumatic.     Eyes:      Conjunctiva/sclera: Conjunctivae normal.      Pupils: Pupils are equal, round, and reactive to light.       Cardiovascular:      Rate and Rhythm: Normal rate.      Pulses: Normal pulses.   Pulmonary:      Effort: Pulmonary effort is normal. No respiratory distress.     Musculoskeletal:      Cervical back:  "Normal range of motion and neck supple.      Comments: As noted in HPI     Skin:     General: Skin is warm and dry.     Neurological:      General: No focal deficit present.      Mental Status: He is alert and oriented to person, place, and time.     Psychiatric:         Mood and Affect: Mood normal.         Behavior: Behavior normal.        _____________________________________________________  STUDIES REVIEWED:  I personally reviewed the pertinent images and my independent interpretation is as follows:      PROCEDURES PERFORMED:  Large joint arthrocentesis    Performed by: Lupillo Lindsay DO  Authorized by: Lupillo Lindsay DO    Universal Protocol:  Consent: Verbal consent obtained  Risks and benefits: risks, benefits and alternatives were discussed  Consent given by: patient  Time out: Immediately prior to procedure a \"time out\" was called to verify the correct patient, procedure, equipment, support staff and site/side marked as required.  Site marked: the operative site was marked  Supporting Documentation  Indications: pain     Is this a Visco injection? NoProcedure Details  Location: shoulder - Shoulder joint: Right proximal biceps tendon sheath.  Preparation: Patient was prepped and draped in the usual sterile fashion  Needle size: 25 G  Ultrasound guidance: yes  Approach: anterior  Medications administered: 40 mg triamcinolone acetonide 40 mg/mL; 2 mL lidocaine 1 %    Patient tolerance: patient tolerated the procedure well with no immediate complications  Dressing:  Sterile dressing applied              This document was created using speech voice recognition software.   Grammatical errors, random word insertions, pronoun errors, and incomplete sentences are an occasional consequence of this system due to software limitations, ambient noise, and hardware issues.   Any formal questions or concerns about content, text, or information contained within the body of this dictation should be directly " addressed to the provider for clarification.         [1]   Past Medical History:  Diagnosis Date   • Cancer (HCC) 2017    bladder   • Hyperlipidemia    • Hypertension    [2]   Past Surgical History:  Procedure Laterality Date   • CHOLECYSTECTOMY     • FL RETROGRADE PYELOGRAM  2022   • HERNIA REPAIR      bilateral linguinal mesh   • NASAL SEPTUM SURGERY     • NEUROPLASTY / TRANSPOSITION ULNAR NERVE AT ELBOW Left    • PARTIAL NEPHRECTOMY     • [place] COLONOSCOPY FLX DX W/COLLJ SPEC WHEN PFRMD N/A 2016    Procedure: COLONOSCOPY;  Surgeon: James Khan MD;  Location: [facility] GI LAB;  Service: Gastroenterology   • [place] CYSTOURETHROSCOPY W/DEST &/RMVL TUMOR LARGE N/A 2022    Procedure: TRANSURETHRAL RESECTION OF BLADDER TUMOR (TURBT), INSTILLATION OF GEMCITABINE;  Surgeon: Vikki Scott MD;  Location: [facility] OR;  Service: Urology   • [place] CYSTOURETHROSCOPY W/URETERAL CATHETERIZATION Bilateral 2022    Procedure: CYSTOSCOPY WITH RETROGRADE PYELOGRAM;  Surgeon: Vikki Scott MD;  Location: [facility];  Service: Urology   • US GUIDED THYROID BIOPSY  2023   [3]   Family History  Problem Relation Name Age of Onset   • Diabetes Mother     [4]   Social History  Tobacco Use   • Smoking status: Former     Current packs/day: 0.00     Average packs/day: 2.0 packs/day for 40.0 years (80.0 ttl pk-yrs)     Types: Cigarettes     Start date: 1976     Quit date: 2016     Years since quittin.1   • Smokeless tobacco: Never   Vaping Use   • Vaping status: Never Used   Substance Use Topics   • Alcohol use: Yes     Comment: rare   • Drug use: No   [5]   Current Outpatient Medications:   •  levothyroxine 125 mcg tablet, Take 125 mcg by mouth in the morning., Disp: , Rfl:   •  losartan (COZAAR) 25 mg tablet, Take 25 mg by mouth in the morning., Disp: , Rfl:   •  rosuvastatin (CRESTOR) 20 MG tablet, Take 20 mg by mouth every morning, Disp: , Rfl:   [6] No Known Allergies

## 2025-05-30 NOTE — PROGRESS NOTES
"Patient Name: Getachew Young      : 1965       MRN: [id number]   Encounter Provider: Lupillo Lindsay DO   Encounter Date: 25  Encounter department: [facility]         Assessment & Plan  Biceps tendinitis of right shoulder  Getachew has right-sided shoulder pain consistent withbiceps tendinitis.  He did have fluid surrounding the biceps tendon sheath as well as additional fluid which appeared to be along the subscapularis tendon under ultrasound.  He tolerated ultrasound-guided biceps injection today and hopefully this gives him the relief coming forward.  He was counseled to monitor his blood sugar in the next 24 hours as well as return in 2 weeks for opposite shoulder injection.                Follow-up:  No follow-ups on file.     _____________________________________________________  CHIEF COMPLAINT:  Chief Complaint   Patient presents with    Right Shoulder - Pain       Height 5' 11\" (1.803 m), weight 106 kg (234 lb).         SUBJECTIVE:  Getachew Young is a 59 y.o. male who presents to the office today for an ultrasound-guided biceps tendon sheath injection of the right shoulder.  He was referred by Dr. Love after recent evaluation.  He works a job with repetitive lifting of signs and sheet-metal.  He denies any recent trauma.  He has similar pain in the left side and is scheduled for an ultrasound-guided biceps injection in 2 weeks.    PAST MEDICAL HISTORY:  Past Medical History[1]    PAST SURGICAL HISTORY:  Past Surgical History[2]    FAMILY HISTORY:  Family History[3]    SOCIAL HISTORY:  Social History[4]    MEDICATIONS:  Current Medications[5]    ALLERGIES:  Allergies[6]      _____________________________________________________  Review of Systems   Constitutional:  Negative for chills, fever and unexpected weight change.   HENT:  Negative for hearing loss, nosebleeds and sore throat.    Eyes:  Negative for pain, redness and visual disturbance.   Respiratory:  " Negative for cough, shortness of breath and wheezing.    Cardiovascular:  Negative for chest pain, palpitations and leg swelling.   Gastrointestinal:  Negative for abdominal pain, nausea and vomiting.   Endocrine: Negative for polyphagia and polyuria.   Genitourinary:  Negative for dysuria and hematuria.   Musculoskeletal:         See HPI   Skin:  Negative for rash and wound.   Neurological:  Negative for dizziness, numbness and headaches.   Psychiatric/Behavioral:  Negative for decreased concentration and suicidal ideas. The patient is not nervous/anxious.         Right Shoulder Exam     Tenderness   The patient is experiencing tenderness in the biceps tendon.    Range of Motion   Active abduction:  normal   Passive abduction:  normal   Extension:  normal   External rotation:  normal   Forward flexion:  normal     Other   Erythema: absent  Sensation: normal  Pulse: present             Physical Exam  Vitals reviewed.   Constitutional:       Appearance: He is well-developed.   HENT:      Head: Normocephalic and atraumatic.     Eyes:      Conjunctiva/sclera: Conjunctivae normal.      Pupils: Pupils are equal, round, and reactive to light.       Cardiovascular:      Rate and Rhythm: Normal rate.      Pulses: Normal pulses.   Pulmonary:      Effort: Pulmonary effort is normal. No respiratory distress.     Musculoskeletal:      Cervical back: Normal range of motion and neck supple.      Comments: As noted in HPI     Skin:     General: Skin is warm and dry.     Neurological:      General: No focal deficit present.      Mental Status: He is alert and oriented to person, place, and time.     Psychiatric:         Mood and Affect: Mood normal.         Behavior: Behavior normal.        _____________________________________________________  STUDIES REVIEWED:  I personally reviewed the pertinent images and my independent interpretation is as follows:      PROCEDURES PERFORMED:  Large joint arthrocentesis    Performed by: Lupillo  "Colin Lindsay DO  Authorized by: Lupillo Lindsay DO    Universal Protocol:  Consent: Verbal consent obtained  Risks and benefits: risks, benefits and alternatives were discussed  Consent given by: patient  Time out: Immediately prior to procedure a \"time out\" was called to verify the correct patient, procedure, equipment, support staff and site/side marked as required.  Site marked: the operative site was marked  Supporting Documentation  Indications: pain     Is this a Visco injection? NoProcedure Details  Location: shoulder - Shoulder joint: Right proximal biceps tendon sheath.  Preparation: Patient was prepped and draped in the usual sterile fashion  Needle size: 25 G  Ultrasound guidance: yes  Approach: anterior  Medications administered: 40 mg triamcinolone acetonide 40 mg/mL; 2 mL lidocaine 1 %    Patient tolerance: patient tolerated the procedure well with no immediate complications  Dressing:  Sterile dressing applied              This document was created using speech voice recognition software.   Grammatical errors, random word insertions, pronoun errors, and incomplete sentences are an occasional consequence of this system due to software limitations, ambient noise, and hardware issues.   Any formal questions or concerns about content, text, or information contained within the body of this dictation should be directly addressed to the provider for clarification.         [1]   Past Medical History:  Diagnosis Date    Cancer (HCC) 2017    bladder    Hyperlipidemia     Hypertension    [2]   Past Surgical History:  Procedure Laterality Date    CHOLECYSTECTOMY      FL RETROGRADE PYELOGRAM  11/2/2022    HERNIA REPAIR      bilateral linguinal mesh    NASAL SEPTUM SURGERY      NEUROPLASTY / TRANSPOSITION ULNAR NERVE AT ELBOW Left     PARTIAL NEPHRECTOMY      NM COLONOSCOPY FLX DX W/COLLJ SPEC WHEN PFRMD N/A 03/11/2016    Procedure: COLONOSCOPY;  Surgeon: James Khan MD;  Location: [facility] GI LAB;  " Service: Gastroenterology    AR CYSTOURETHROSCOPY W/DEST &/RMVL TUMOR LARGE N/A 2022    Procedure: TRANSURETHRAL RESECTION OF BLADDER TUMOR (TURBT), INSTILLATION OF GEMCITABINE;  Surgeon: Vikki Scott MD;  Location: [facility];  Service: Urology    AR CYSTOURETHROSCOPY W/URETERAL CATHETERIZATION Bilateral 2022    Procedure: CYSTOSCOPY WITH RETROGRADE PYELOGRAM;  Surgeon: Vikki Scott MD;  Location: [facility];  Service: Urology    US GUIDED THYROID BIOPSY  2023   [3]   Family History  Problem Relation Name Age of Onset    Diabetes Mother     [4]   Social History  Tobacco Use    Smoking status: Former     Current packs/day: 0.00     Average packs/day: 2.0 packs/day for 40.0 years (80.0 ttl pk-yrs)     Types: Cigarettes     Start date: 1976     Quit date: 2016     Years since quittin.1    Smokeless tobacco: Never   Vaping Use    Vaping status: Never Used   Substance Use Topics    Alcohol use: Yes     Comment: rare    Drug use: No   [5]   Current Outpatient Medications:     levothyroxine 125 mcg tablet, Take 125 mcg by mouth in the morning., Disp: , Rfl:     losartan (COZAAR) 25 mg tablet, Take 25 mg by mouth in the morning., Disp: , Rfl:     rosuvastatin (CRESTOR) 20 MG tablet, Take 20 mg by mouth every morning, Disp: , Rfl:   [6] No Known Allergies

## 2025-06-13 ENCOUNTER — PROCEDURE VISIT (OUTPATIENT)
Dept: OBGYN CLINIC | Facility: CLINIC | Age: 60
End: 2025-06-13
Payer: COMMERCIAL

## 2025-06-13 DIAGNOSIS — M75.22 BICEPS TENDINITIS OF LEFT SHOULDER: Primary | ICD-10-CM

## 2025-06-13 PROCEDURE — 20611 DRAIN/INJ JOINT/BURSA W/US: CPT | Performed by: ORTHOPAEDIC SURGERY

## 2025-06-13 PROCEDURE — 99213 OFFICE O/P EST LOW 20 MIN: CPT | Performed by: ORTHOPAEDIC SURGERY

## 2025-06-13 RX ORDER — TRIAMCINOLONE ACETONIDE 40 MG/ML
40 INJECTION, SUSPENSION INTRA-ARTICULAR; INTRAMUSCULAR
Status: COMPLETED | OUTPATIENT
Start: 2025-06-13 | End: 2025-06-13

## 2025-06-13 RX ORDER — LIDOCAINE HYDROCHLORIDE 10 MG/ML
2 INJECTION, SOLUTION INFILTRATION; PERINEURAL
Status: COMPLETED | OUTPATIENT
Start: 2025-06-13 | End: 2025-06-13

## 2025-06-13 RX ADMIN — TRIAMCINOLONE ACETONIDE 40 MG: 40 INJECTION, SUSPENSION INTRA-ARTICULAR; INTRAMUSCULAR at 08:30

## 2025-06-13 RX ADMIN — LIDOCAINE HYDROCHLORIDE 2 ML: 10 INJECTION, SOLUTION INFILTRATION; PERINEURAL at 08:30

## 2025-06-13 NOTE — PROGRESS NOTES
Patient Name: Getachew Young      : 1965       MRN: [id number]   Encounter Provider: Lupillo Lindsay DO   Encounter Date: 25  Encounter department: [facility]         Assessment & Plan  Biceps tendinitis of left shoulder  Getachew has pain over the anterior aspect of the left shoulder consistent with biceps tendinitis and tolerated an ultrasound-guided biceps tendon sheath injection where he did have a large amount of fluid surrounding the tendon sheath.  He tolerated the procedure well and had immediate relief of symptoms.  Hopefully this gives him long relief in regard to his bicep pain similar to the other side.  Follow-up with me as needed.    Orders:    Large joint arthrocentesis           Follow-up:  No follow-ups on file.     _____________________________________________________  CHIEF COMPLAINT:  Chief Complaint   Patient presents with    Left Shoulder - Injections       There were no vitals taken for this visit.  Pain Score:   3      SUBJECTIVE:  Getachew Young is a 59 y.o. male who presents today for an ultrasound-guided biceps tendon sheath injection in the left shoulder.  He was in the office 2 weeks ago where we proceeded with a right biceps tendon sheath injection which she tolerated very well.  He is feeling the same discomfort in the left shoulder and we previously discussed trying the same procedure on this side to target his pain and inflammation around the biceps tendon.    PAST MEDICAL HISTORY:  Past Medical History[1]    PAST SURGICAL HISTORY:  Past Surgical History[2]    FAMILY HISTORY:  Family History[3]    SOCIAL HISTORY:  Social History[4]    MEDICATIONS:  Current Medications[5]    ALLERGIES:  Allergies[6]      _____________________________________________________  Review of Systems     Left Shoulder Exam     Tenderness   The patient is experiencing tenderness in the biceps tendon.    Range of Motion   Active abduction:  normal   Passive abduction:  " normal   Extension:  normal   External rotation:  normal   Forward flexion:  normal   Internal rotation 0 degrees:  normal     Other   Erythema: absent  Sensation: normal  Pulse: present              Physical Exam  Vitals reviewed.   Constitutional:       Appearance: He is well-developed.   HENT:      Head: Normocephalic and atraumatic.     Eyes:      Conjunctiva/sclera: Conjunctivae normal.      Pupils: Pupils are equal, round, and reactive to light.       Cardiovascular:      Rate and Rhythm: Normal rate.      Pulses: Normal pulses.   Pulmonary:      Effort: Pulmonary effort is normal. No respiratory distress.     Musculoskeletal:      Cervical back: Normal range of motion and neck supple.      Comments: As noted in HPI     Skin:     General: Skin is warm and dry.     Neurological:      General: No focal deficit present.      Mental Status: He is alert and oriented to person, place, and time.     Psychiatric:         Mood and Affect: Mood normal.         Behavior: Behavior normal.        _____________________________________________________  STUDIES REVIEWED:  I personally reviewed the pertinent images and my independent interpretation is as follows:      PROCEDURES PERFORMED:  Large joint arthrocentesis    Performed by: Lupillo Lindsay DO  Authorized by: Lupillo Lindsay DO    Universal Protocol:  Consent: Verbal consent obtained  Risks and benefits: risks, benefits and alternatives were discussed  Consent given by: patient  Time out: Immediately prior to procedure a \"time out\" was called to verify the correct patient, procedure, equipment, support staff and site/side marked as required.  Site marked: the operative site was marked  Supporting Documentation  Indications: pain     Is this a Visco injection? NoProcedure Details  Location: shoulder - Shoulder joint: L bicep tendon sheath.  Preparation: Patient was prepped and draped in the usual sterile fashion  Needle size: 25 G  Ultrasound guidance: " yes  Approach: posterior  Medications administered: 2 mL lidocaine 1 %; 40 mg triamcinolone acetonide 40 mg/mL    Patient tolerance: patient tolerated the procedure well with no immediate complications  Dressing:  Sterile dressing applied              This document was created using speech voice recognition software.   Grammatical errors, random word insertions, pronoun errors, and incomplete sentences are an occasional consequence of this system due to software limitations, ambient noise, and hardware issues.   Any formal questions or concerns about content, text, or information contained within the body of this dictation should be directly addressed to the provider for clarification.         [1]   Past Medical History:  Diagnosis Date    Cancer (HCC) 2017    bladder    Hyperlipidemia     Hypertension    [2]   Past Surgical History:  Procedure Laterality Date    CHOLECYSTECTOMY      FL RETROGRADE PYELOGRAM  11/2/2022    HERNIA REPAIR      bilateral linguinal mesh    NASAL SEPTUM SURGERY      NEUROPLASTY / TRANSPOSITION ULNAR NERVE AT ELBOW Left     PARTIAL NEPHRECTOMY      AR COLONOSCOPY FLX DX W/COLLJ SPEC WHEN PFRMD N/A 03/11/2016    Procedure: COLONOSCOPY;  Surgeon: James Khan MD;  Location: [facility] GI LAB;  Service: Gastroenterology    AR CYSTOURETHROSCOPY W/DEST &/RMVL TUMOR LARGE N/A 11/2/2022    Procedure: TRANSURETHRAL RESECTION OF BLADDER TUMOR (TURBT), INSTILLATION OF GEMCITABINE;  Surgeon: Vikki Scott MD;  Location: [facility];  Service: Urology    AR CYSTOURETHROSCOPY W/URETERAL CATHETERIZATION Bilateral 11/2/2022    Procedure: CYSTOSCOPY WITH RETROGRADE PYELOGRAM;  Surgeon: Vikki Scott MD;  Location: [facility];  Service: Urology    US GUIDED THYROID BIOPSY  7/11/2023   [3]   Family History  Problem Relation Name Age of Onset    Diabetes Mother     [4]   Social History  Tobacco Use    Smoking status: Former     Current packs/day: 0.00     Average packs/day: 2.0 packs/day for 40.0 years  (80.0 ttl pk-yrs)     Types: Cigarettes     Start date: 1976     Quit date: 2016     Years since quittin.1    Smokeless tobacco: Never   Vaping Use    Vaping status: Never Used   Substance Use Topics    Alcohol use: Yes     Comment: rare    Drug use: No   [5]   Current Outpatient Medications:     levothyroxine 125 mcg tablet, Take 125 mcg by mouth in the morning., Disp: , Rfl:     losartan (COZAAR) 25 mg tablet, Take 25 mg by mouth in the morning., Disp: , Rfl:     rosuvastatin (CRESTOR) 20 MG tablet, Take 20 mg by mouth every morning, Disp: , Rfl:   [6] No Known Allergies

## 2025-06-16 NOTE — PROGRESS NOTES
Name: Getachew Young      : 1965      MRN: [id number]  Encounter Provider: Daniel Alicea MD  Encounter Date: 2025   Encounter department: [facility]  :  Assessment & Plan  Benign localized prostatic hyperplasia with lower urinary tract symptoms (LUTS)  Minimal bother  Does not want medical therapies  Survey symptoms    Orders:    Cytology, urine    Bladder carcinoma (HCC)  High risk bladder cancer s/p resection and bcg  Due for repeat upper tract imaging, ordered  Negative cystoscopy today  DIALLO  Rtc 1 year for cystoscopy    Orders:    Cytology, urine    CT renal protocol; Future    Basic metabolic panel; Future    Papillary renal cell carcinoma (HCC)  S/p left partial nephrectomy   DIALLO  Repeat CT scan ordered    Orders:    Cytology, urine    CT renal protocol; Future    Basic metabolic panel; Future    Erectile dysfunction due to arterial insufficiency    Prostate cancer screening    Encounter to discuss test results  I have spent a total time of 30 minutes in caring for this patient on the day of the visit/encounter including Diagnostic results, Prognosis, Risks and benefits of tx options, Instructions for management, Patient and family education, Importance of tx compliance, Risk factor reductions, Impressions, Counseling / Coordination of care, Documenting in the medical record, and this is in addition to the time necessary for cystoscopy.                History of Present Illness   Getachew Young is a 59 y.o. male who presents for follow up of renal cell carcinoma and bladder cancer    Doing well, no complaints    ECOG 0    The following portions of the patient's history were reviewed and updated as appropriate: allergies, current medications, past family history, past medical history, past social history, past surgical history and problem list.      Review of Systems   Constitutional: Negative.    HENT: Negative.     Eyes: Negative.    Respiratory: Negative.      Cardiovascular: Negative.    Gastrointestinal: Negative.    Endocrine: Negative.    Genitourinary: Negative.    Musculoskeletal: Negative.    Skin: Negative.    Allergic/Immunologic: Negative.    Neurological: Negative.    Hematological: Negative.    Psychiatric/Behavioral: Negative.            Objective   There were no vitals taken for this visit.    Physical Exam  Vitals reviewed.   Constitutional:       General: He is not in acute distress.     Appearance: Normal appearance. He is well-developed. He is not ill-appearing, toxic-appearing or diaphoretic.   HENT:      Head: Normocephalic and atraumatic.      Nose: Nose normal.      Mouth/Throat:      Mouth: Mucous membranes are moist.     Eyes:      General: No scleral icterus.        Right eye: No discharge.         Left eye: No discharge.     Neck:      Thyroid: No thyromegaly.      Trachea: No tracheal deviation.   Pulmonary:      Effort: Pulmonary effort is normal.   Chest:      Chest wall: No tenderness.   Abdominal:      General: There is no distension.      Palpations: There is no mass.      Tenderness: There is no abdominal tenderness. There is no guarding.      Hernia: No hernia is present.     Musculoskeletal:         General: No deformity or signs of injury. Normal range of motion.      Cervical back: Normal range of motion and neck supple.   Lymphadenopathy:      Cervical: No cervical adenopathy.     Skin:     General: Skin is dry.      Coloration: Skin is not jaundiced or pale.      Findings: No erythema.     Neurological:      Mental Status: He is alert and oriented to person, place, and time.      Cranial Nerves: No cranial nerve deficit.      Motor: No abnormal muscle tone.      Coordination: Coordination normal.     Psychiatric:         Mood and Affect: Mood normal.         Behavior: Behavior normal.         Thought Content: Thought content normal.         Judgment: Judgment normal.           Results   Lab Results   Component Value Date    PSA 1.4  11/26/2024    PSA 1.7 01/25/2021    PSA 2.6 01/22/2020     Lab Results   Component Value Date    GLUCOSE 104 (H) 02/27/2017    CALCIUM 8.6 11/02/2022     02/27/2017    K 4.8 04/24/2025    CO2 23 04/24/2025     04/24/2025    BUN 19 04/24/2025    CREATININE 1.20 04/24/2025     Lab Results   Component Value Date    WBC 10.7 11/26/2024    HGB 16.6 11/26/2024    HCT 50.3 11/26/2024    MCV 88 11/26/2024     11/26/2024       Office Urine Dip  No results found for this or any previous visit (from the past hour).

## 2025-06-18 ENCOUNTER — PROCEDURE VISIT (OUTPATIENT)
Dept: UROLOGY | Facility: CLINIC | Age: 60
End: 2025-06-18
Payer: COMMERCIAL

## 2025-06-18 VITALS
DIASTOLIC BLOOD PRESSURE: 70 MMHG | SYSTOLIC BLOOD PRESSURE: 134 MMHG | HEIGHT: 71 IN | HEART RATE: 82 BPM | BODY MASS INDEX: 32.34 KG/M2 | WEIGHT: 231 LBS | OXYGEN SATURATION: 94 %

## 2025-06-18 DIAGNOSIS — Z71.2 ENCOUNTER TO DISCUSS TEST RESULTS: ICD-10-CM

## 2025-06-18 DIAGNOSIS — Z12.5 PROSTATE CANCER SCREENING: ICD-10-CM

## 2025-06-18 DIAGNOSIS — N40.1 BENIGN LOCALIZED PROSTATIC HYPERPLASIA WITH LOWER URINARY TRACT SYMPTOMS (LUTS): Primary | ICD-10-CM

## 2025-06-18 DIAGNOSIS — C67.9 BLADDER CARCINOMA (HCC): ICD-10-CM

## 2025-06-18 DIAGNOSIS — C64.9 PAPILLARY RENAL CELL CARCINOMA (HCC): ICD-10-CM

## 2025-06-18 DIAGNOSIS — N52.01 ERECTILE DYSFUNCTION DUE TO ARTERIAL INSUFFICIENCY: ICD-10-CM

## 2025-06-18 PROCEDURE — 52000 CYSTOURETHROSCOPY: CPT | Performed by: UROLOGY

## 2025-06-18 PROCEDURE — 99214 OFFICE O/P EST MOD 30 MIN: CPT | Performed by: UROLOGY

## 2025-06-18 NOTE — ASSESSMENT & PLAN NOTE
High risk bladder cancer s/p resection and bcg  Due for repeat upper tract imaging, ordered  Negative cystoscopy today  DIALLO  Rtc 1 year for cystoscopy    Orders:    Cytology, urine    CT renal protocol; Future    Basic metabolic panel; Future

## 2025-06-18 NOTE — ASSESSMENT & PLAN NOTE
S/p left partial nephrectomy   DIALLO  Repeat CT scan ordered    Orders:    Cytology, urine    CT renal protocol; Future    Basic metabolic panel; Future

## 2025-06-18 NOTE — ASSESSMENT & PLAN NOTE
I have spent a total time of 30 minutes in caring for this patient on the day of the visit/encounter including Diagnostic results, Prognosis, Risks and benefits of tx options, Instructions for management, Patient and family education, Importance of tx compliance, Risk factor reductions, Impressions, Counseling / Coordination of care, Documenting in the medical record, and this is in addition to the time necessary for cystoscopy.

## 2025-06-18 NOTE — PROGRESS NOTES
Office Cystoscopy Procedure Note    Indication:    Urothelial carcinoma of the bladder    Informed consent   The risks, benefits, complications, treatment options, and expected outcomes were discussed with the patient. The patient concurred with the proposed plan and provided informed consent.    Anesthesia  Lidocaine jelly 2%    Antibiotic prophylaxis   None    Procedure  The patient was placed in the supineposition, was prepped and draped in the usual manner using sterile technique, and 2% lidocaine jelly instilled into the urethra.  A 17 F flexible cystoscope was then inserted into the urethra and the urethra and bladder carefully examined.  The following findings were noted:    Findings:  Urethra:  Normal  Prostate:  moderate lateral lobe hypertrophy, no median lobe, no lesions  Bladder:  Negative for recurrence and tumor  Ureteral orifices:  orthotopic  Other findings:  None, retroflexed view confirms    Specimens: None                 Complications:    None; patient tolerated the procedure well           Disposition: To home            Condition: Stable    Plan: Negative cystoscopy, RTC 1 year for cystoscopy       Cystoscopy     Date/Time  6/18/2025 8:00 AM     Performed by  Daniel Alicea MD   Authorized by  Daniel Alicea MD       Universal Protocol:  procedure performed by consultantConsent: Verbal consent obtained. Written consent obtained  Risks and benefits: risks, benefits and alternatives were discussed  Consent given by: patient  Patient understanding: patient states understanding of the procedure being performed  Patient consent: the patient's understanding of the procedure matches consent given  Procedure consent: procedure consent matches procedure scheduled  Relevant documents: relevant documents present and verified  Test results: test results available and properly labeled  Site marked: the operative site was not marked  Radiology Images displayed and confirmed. If images not available,  report reviewed: imaging studies available  Required items: required blood products, implants, devices, and special equipment available  Patient identity confirmed: verbally with patient and provided demographic data      Procedure Details:  Procedure type: cystoscopy    Patient tolerance: Patient tolerated the procedure well with no immediate complications    Additional Procedure Details: Office Cystoscopy Procedure Note    Indication:    Urothelial carcinoma of the bladder    Informed consent   The risks, benefits, complications, treatment options, and expected outcomes were discussed with the patient. The patient concurred with the proposed plan and provided informed consent.    Anesthesia  Lidocaine jelly 2%    Antibiotic prophylaxis   None    Procedure  The patient was placed in the supineposition, was prepped and draped in the usual manner using sterile technique, and 2% lidocaine jelly instilled into the urethra.  A 17 F flexible cystoscope was then inserted into the urethra and the urethra and bladder carefully examined.  The following findings were noted:    Findings:  Urethra:  Normal  Prostate:  moderate lateral lobe hypertrophy, no median lobe, no lesions  Bladder:  Negative for recurrence and tumor  Ureteral orifices:  orthotopic  Other findings:  None, retroflexed view confirms    Specimens: None                 Complications:    None; patient tolerated the procedure well           Disposition: To home            Condition: Stable    Plan: Negative cystoscopy, RTC 1 year for cystoscopy

## 2025-06-20 ENCOUNTER — RESULTS FOLLOW-UP (OUTPATIENT)
Dept: OTHER | Facility: HOSPITAL | Age: 60
End: 2025-06-20

## 2025-06-25 ENCOUNTER — TELEPHONE (OUTPATIENT)
Dept: ADMINISTRATIVE | Facility: HOSPITAL | Age: 60
End: 2025-06-25

## 2025-06-25 DIAGNOSIS — C64.9 PAPILLARY RENAL CELL CARCINOMA (HCC): Primary | ICD-10-CM

## 2025-06-25 DIAGNOSIS — C67.9 BLADDER CARCINOMA (HCC): ICD-10-CM

## 2025-06-26 NOTE — TELEPHONE ENCOUNTER
Please tell patient that the CT was denied by insurance. US was ordered in its place. Please provide CS number to schedule

## 2025-06-30 ENCOUNTER — HOSPITAL ENCOUNTER (OUTPATIENT)
Dept: RADIOLOGY | Facility: HOSPITAL | Age: 60
Discharge: HOME | End: 2025-06-30
Attending: PHYSICIAN ASSISTANT
Payer: COMMERCIAL

## 2025-06-30 DIAGNOSIS — C67.9 BLADDER CARCINOMA (HCC): ICD-10-CM

## 2025-06-30 DIAGNOSIS — C64.9 PAPILLARY RENAL CELL CARCINOMA (HCC): ICD-10-CM

## 2025-06-30 PROCEDURE — 76775 US EXAM ABDO BACK WALL LIM: CPT

## 2025-07-15 ENCOUNTER — TELEPHONE (OUTPATIENT)
Age: 60
End: 2025-07-15

## (undated) DEVICE — CHLORHEXIDINE 4PCT 4 OZ

## (undated) DEVICE — GUIDEWIRE STRGHT TIP 0.035 IN  SOLO PLUS

## (undated) DEVICE — Device

## (undated) DEVICE — SPECIMEN CONTAINER STERILE PEEL PACK

## (undated) DEVICE — INVIEW CLEAR LEGGINGS: Brand: CONVERTORS

## (undated) DEVICE — CATH URETERAL 5FR X 70 CM FLEX TIP POLYUR BARD

## (undated) DEVICE — UROCATCH BAG

## (undated) DEVICE — PACK TUR

## (undated) DEVICE — GLOVE SRG BIOGEL 7

## (undated) DEVICE — STERILE SURGICAL LUBRICANT,  TUBE: Brand: SURGILUBE

## (undated) DEVICE — BAG URINE DRAINAGE 2000ML ANTI RFLX LF

## (undated) DEVICE — SYRINGE 10ML LL CONTROL TOP

## (undated) DEVICE — POV-IOD SOLUTION 4OZ BT